# Patient Record
Sex: FEMALE | Race: WHITE | Employment: OTHER | URBAN - METROPOLITAN AREA
[De-identification: names, ages, dates, MRNs, and addresses within clinical notes are randomized per-mention and may not be internally consistent; named-entity substitution may affect disease eponyms.]

---

## 2017-01-30 ENCOUNTER — HOSPITAL ENCOUNTER (OUTPATIENT)
Dept: MAMMOGRAPHY | Age: 67
Discharge: HOME | End: 2017-01-30
Attending: OBSTETRICS & GYNECOLOGY | Admitting: OBSTETRICS & GYNECOLOGY

## 2017-01-30 DIAGNOSIS — Z12.31 ENCOUNTER FOR SCREENING MAMMOGRAM FOR BREAST CANCER: ICD-10-CM

## 2017-09-14 ENCOUNTER — TELEPHONE (OUTPATIENT)
Dept: INTERNAL MEDICINE | Age: 67
End: 2017-09-14

## 2017-09-14 DIAGNOSIS — Z00.00 PHYSICAL EXAM, ANNUAL: ICD-10-CM

## 2017-09-14 DIAGNOSIS — E78.5 HYPERLIPIDEMIA, UNSPECIFIED HYPERLIPIDEMIA TYPE: ICD-10-CM

## 2017-09-14 DIAGNOSIS — I10 ESSENTIAL HYPERTENSION: Primary | ICD-10-CM

## 2017-09-26 ENCOUNTER — OFFICE VISIT (OUTPATIENT)
Dept: INTERNAL MEDICINE | Age: 67
End: 2017-09-26

## 2017-09-26 VITALS
DIASTOLIC BLOOD PRESSURE: 80 MMHG | BODY MASS INDEX: 30.3 KG/M2 | HEART RATE: 60 BPM | HEIGHT: 63 IN | SYSTOLIC BLOOD PRESSURE: 152 MMHG | WEIGHT: 171 LBS

## 2017-09-26 DIAGNOSIS — M75.111 PARTIAL TEAR OF RIGHT ROTATOR CUFF: ICD-10-CM

## 2017-09-26 DIAGNOSIS — Z23 NEED FOR PNEUMOCOCCAL VACCINATION: ICD-10-CM

## 2017-09-26 DIAGNOSIS — I10 ESSENTIAL HYPERTENSION: Primary | ICD-10-CM

## 2017-09-26 DIAGNOSIS — M47.812 CERVICAL ARTHRITIS: ICD-10-CM

## 2017-09-26 DIAGNOSIS — G89.29 CHRONIC LEFT SHOULDER PAIN: ICD-10-CM

## 2017-09-26 DIAGNOSIS — M25.512 CHRONIC LEFT SHOULDER PAIN: ICD-10-CM

## 2017-09-26 DIAGNOSIS — K27.9 PEPTIC ULCER: ICD-10-CM

## 2017-09-26 DIAGNOSIS — E78.5 HYPERLIPIDEMIA, UNSPECIFIED HYPERLIPIDEMIA TYPE: ICD-10-CM

## 2017-09-26 DIAGNOSIS — M76.31 ILIOTIBIAL BAND TENDINITIS OF RIGHT SIDE: ICD-10-CM

## 2017-09-26 DIAGNOSIS — Z00.00 PHYSICAL EXAM, ANNUAL: ICD-10-CM

## 2017-09-26 DIAGNOSIS — Z23 NEED FOR INFLUENZA VACCINATION: ICD-10-CM

## 2017-09-26 PROCEDURE — 90662 IIV NO PRSV INCREASED AG IM: CPT | Performed by: INTERNAL MEDICINE

## 2017-09-26 PROCEDURE — G0439 PPPS, SUBSEQ VISIT: HCPCS | Performed by: INTERNAL MEDICINE

## 2017-09-26 PROCEDURE — G0008 ADMIN INFLUENZA VIRUS VAC: HCPCS | Performed by: INTERNAL MEDICINE

## 2017-09-26 PROCEDURE — G0009 ADMIN PNEUMOCOCCAL VACCINE: HCPCS | Performed by: INTERNAL MEDICINE

## 2017-09-26 PROCEDURE — 90732 PPSV23 VACC 2 YRS+ SUBQ/IM: CPT | Performed by: INTERNAL MEDICINE

## 2017-09-26 PROCEDURE — 93000 ELECTROCARDIOGRAM COMPLETE: CPT | Performed by: INTERNAL MEDICINE

## 2017-09-26 ASSESSMENT — PATIENT HEALTH QUESTIONNAIRE - PHQ9
SUM OF ALL RESPONSES TO PHQ9 QUESTIONS 1 & 2: 0
SUM OF ALL RESPONSES TO PHQ QUESTIONS 1-9: 0
1. LITTLE INTEREST OR PLEASURE IN DOING THINGS: 0
2. FEELING DOWN, DEPRESSED OR HOPELESS: 0

## 2017-09-26 ASSESSMENT — ENCOUNTER SYMPTOMS
GASTROINTESTINAL NEGATIVE: 1
ALLERGIC/IMMUNOLOGIC NEGATIVE: 1
RESPIRATORY NEGATIVE: 1

## 2018-02-06 ENCOUNTER — HOSPITAL ENCOUNTER (OUTPATIENT)
Dept: MAMMOGRAPHY | Age: 68
Discharge: HOME | End: 2018-02-06
Attending: FAMILY MEDICINE | Admitting: FAMILY MEDICINE

## 2018-02-06 DIAGNOSIS — Z12.31 ENCOUNTER FOR SCREENING MAMMOGRAM FOR BREAST CANCER: ICD-10-CM

## 2018-02-13 ENCOUNTER — HOSPITAL ENCOUNTER (OUTPATIENT)
Dept: MAMMOGRAPHY | Age: 68
Discharge: HOME | End: 2018-02-13
Attending: RADIOLOGY | Admitting: RADIOLOGY

## 2018-02-13 DIAGNOSIS — R92.0 ABNORMAL FINDING ON MAMMOGRAPHY, MICROCALCIFICATION: ICD-10-CM

## 2018-03-16 ENCOUNTER — OFFICE VISIT (OUTPATIENT)
Dept: INTERNAL MEDICINE | Age: 68
End: 2018-03-16

## 2018-03-16 VITALS
WEIGHT: 172 LBS | DIASTOLIC BLOOD PRESSURE: 68 MMHG | HEART RATE: 60 BPM | HEIGHT: 63 IN | SYSTOLIC BLOOD PRESSURE: 138 MMHG | BODY MASS INDEX: 30.48 KG/M2 | OXYGEN SATURATION: 97 %

## 2018-03-16 DIAGNOSIS — M47.812 CERVICAL ARTHRITIS: ICD-10-CM

## 2018-03-16 DIAGNOSIS — Z01.818 PREOP EXAMINATION: ICD-10-CM

## 2018-03-16 DIAGNOSIS — K27.9 PEPTIC ULCER: ICD-10-CM

## 2018-03-16 DIAGNOSIS — I10 ESSENTIAL HYPERTENSION: ICD-10-CM

## 2018-03-16 DIAGNOSIS — M72.2 PLANTAR FASCIITIS, RIGHT: ICD-10-CM

## 2018-03-16 PROCEDURE — 1036F TOBACCO NON-USER: CPT | Performed by: INTERNAL MEDICINE

## 2018-03-16 PROCEDURE — 3014F SCREEN MAMMO DOC REV: CPT | Performed by: INTERNAL MEDICINE

## 2018-03-16 PROCEDURE — 3017F COLORECTAL CA SCREEN DOC REV: CPT | Performed by: INTERNAL MEDICINE

## 2018-03-16 PROCEDURE — G8427 DOCREV CUR MEDS BY ELIG CLIN: HCPCS | Performed by: INTERNAL MEDICINE

## 2018-03-16 PROCEDURE — 4040F PNEUMOC VAC/ADMIN/RCVD: CPT | Performed by: INTERNAL MEDICINE

## 2018-03-16 PROCEDURE — G8482 FLU IMMUNIZE ORDER/ADMIN: HCPCS | Performed by: INTERNAL MEDICINE

## 2018-03-16 PROCEDURE — G8400 PT W/DXA NO RESULTS DOC: HCPCS | Performed by: INTERNAL MEDICINE

## 2018-03-16 PROCEDURE — 1090F PRES/ABSN URINE INCON ASSESS: CPT | Performed by: INTERNAL MEDICINE

## 2018-03-16 PROCEDURE — G8417 CALC BMI ABV UP PARAM F/U: HCPCS | Performed by: INTERNAL MEDICINE

## 2018-03-16 PROCEDURE — 1123F ACP DISCUSS/DSCN MKR DOCD: CPT | Performed by: INTERNAL MEDICINE

## 2018-03-16 PROCEDURE — 99215 OFFICE O/P EST HI 40 MIN: CPT | Performed by: INTERNAL MEDICINE

## 2018-03-16 RX ORDER — LANOLIN ALCOHOL/MO/W.PET/CERES
1 CREAM (GRAM) TOPICAL DAILY PRN
COMMUNITY
End: 2018-10-02

## 2018-03-16 ASSESSMENT — ENCOUNTER SYMPTOMS
RESPIRATORY NEGATIVE: 1
GASTROINTESTINAL NEGATIVE: 1
TROUBLE SWALLOWING: 0
EYE DISCHARGE: 0

## 2018-03-16 NOTE — ASSESSMENT & PLAN NOTE
Right foot plantar fasciitisscheduled surgical release. OK FOR SURGERY. No known anesthesia problems in patient or family. Take medications as directed.

## 2018-04-09 ENCOUNTER — OFFICE VISIT (OUTPATIENT)
Dept: INTERNAL MEDICINE | Age: 68
End: 2018-04-09

## 2018-04-09 VITALS
OXYGEN SATURATION: 98 % | WEIGHT: 172 LBS | HEART RATE: 57 BPM | SYSTOLIC BLOOD PRESSURE: 188 MMHG | HEIGHT: 63 IN | BODY MASS INDEX: 30.48 KG/M2 | DIASTOLIC BLOOD PRESSURE: 90 MMHG

## 2018-04-09 DIAGNOSIS — M81.0 AGE-RELATED OSTEOPOROSIS WITHOUT CURRENT PATHOLOGICAL FRACTURE: Primary | ICD-10-CM

## 2018-04-09 DIAGNOSIS — I10 ESSENTIAL HYPERTENSION: ICD-10-CM

## 2018-04-09 DIAGNOSIS — M47.812 NECK ARTHRITIS: ICD-10-CM

## 2018-04-09 PROBLEM — M76.31 ILIOTIBIAL BAND TENDINITIS OF RIGHT SIDE: Status: RESOLVED | Noted: 2017-09-26 | Resolved: 2018-04-09

## 2018-04-09 PROBLEM — Z01.818 PREOP EXAMINATION: Status: RESOLVED | Noted: 2018-03-16 | Resolved: 2018-04-09

## 2018-04-09 PROCEDURE — 3014F SCREEN MAMMO DOC REV: CPT | Performed by: INTERNAL MEDICINE

## 2018-04-09 PROCEDURE — 1036F TOBACCO NON-USER: CPT | Performed by: INTERNAL MEDICINE

## 2018-04-09 PROCEDURE — 3017F COLORECTAL CA SCREEN DOC REV: CPT | Performed by: INTERNAL MEDICINE

## 2018-04-09 PROCEDURE — 1123F ACP DISCUSS/DSCN MKR DOCD: CPT | Performed by: INTERNAL MEDICINE

## 2018-04-09 PROCEDURE — G8400 PT W/DXA NO RESULTS DOC: HCPCS | Performed by: INTERNAL MEDICINE

## 2018-04-09 PROCEDURE — 4040F PNEUMOC VAC/ADMIN/RCVD: CPT | Performed by: INTERNAL MEDICINE

## 2018-04-09 PROCEDURE — 99214 OFFICE O/P EST MOD 30 MIN: CPT | Performed by: INTERNAL MEDICINE

## 2018-04-09 PROCEDURE — 1090F PRES/ABSN URINE INCON ASSESS: CPT | Performed by: INTERNAL MEDICINE

## 2018-04-09 PROCEDURE — G8417 CALC BMI ABV UP PARAM F/U: HCPCS | Performed by: INTERNAL MEDICINE

## 2018-04-09 PROCEDURE — G8427 DOCREV CUR MEDS BY ELIG CLIN: HCPCS | Performed by: INTERNAL MEDICINE

## 2018-04-09 RX ORDER — AMLODIPINE BESYLATE 5 MG/1
5 TABLET ORAL DAILY
Qty: 30 TABLET | Refills: 3 | Status: SHIPPED | OUTPATIENT
Start: 2018-04-09 | End: 2018-05-08 | Stop reason: SDUPTHER

## 2018-04-09 ASSESSMENT — ENCOUNTER SYMPTOMS: RESPIRATORY NEGATIVE: 1

## 2018-04-11 ENCOUNTER — HOSPITAL ENCOUNTER (OUTPATIENT)
Dept: GENERAL RADIOLOGY | Age: 68
Discharge: HOME | End: 2018-04-11
Attending: OBSTETRICS & GYNECOLOGY | Admitting: OBSTETRICS & GYNECOLOGY

## 2018-04-11 DIAGNOSIS — M81.0 AGE-RELATED OSTEOPOROSIS WITHOUT CURRENT PATHOLOGICAL FRACTURE: ICD-10-CM

## 2018-05-08 ENCOUNTER — OFFICE VISIT (OUTPATIENT)
Dept: INTERNAL MEDICINE | Age: 68
End: 2018-05-08

## 2018-05-08 VITALS
WEIGHT: 172 LBS | BODY MASS INDEX: 30.48 KG/M2 | HEART RATE: 66 BPM | OXYGEN SATURATION: 98 % | DIASTOLIC BLOOD PRESSURE: 80 MMHG | SYSTOLIC BLOOD PRESSURE: 152 MMHG | HEIGHT: 63 IN

## 2018-05-08 DIAGNOSIS — M85.80 OSTEOPENIA, UNSPECIFIED LOCATION: ICD-10-CM

## 2018-05-08 DIAGNOSIS — I10 ESSENTIAL HYPERTENSION: ICD-10-CM

## 2018-05-08 DIAGNOSIS — Z00.00 PREVENTATIVE HEALTH CARE: Primary | ICD-10-CM

## 2018-05-08 PROCEDURE — G8427 DOCREV CUR MEDS BY ELIG CLIN: HCPCS | Performed by: INTERNAL MEDICINE

## 2018-05-08 PROCEDURE — 4040F PNEUMOC VAC/ADMIN/RCVD: CPT | Performed by: INTERNAL MEDICINE

## 2018-05-08 PROCEDURE — 1036F TOBACCO NON-USER: CPT | Performed by: INTERNAL MEDICINE

## 2018-05-08 PROCEDURE — G8417 CALC BMI ABV UP PARAM F/U: HCPCS | Performed by: INTERNAL MEDICINE

## 2018-05-08 PROCEDURE — G8399 PT W/DXA RESULTS DOCUMENT: HCPCS | Performed by: INTERNAL MEDICINE

## 2018-05-08 PROCEDURE — 1123F ACP DISCUSS/DSCN MKR DOCD: CPT | Performed by: INTERNAL MEDICINE

## 2018-05-08 PROCEDURE — 99214 OFFICE O/P EST MOD 30 MIN: CPT | Performed by: INTERNAL MEDICINE

## 2018-05-08 PROCEDURE — 3017F COLORECTAL CA SCREEN DOC REV: CPT | Performed by: INTERNAL MEDICINE

## 2018-05-08 PROCEDURE — 1090F PRES/ABSN URINE INCON ASSESS: CPT | Performed by: INTERNAL MEDICINE

## 2018-05-08 RX ORDER — AMLODIPINE BESYLATE 5 MG/1
5 TABLET ORAL DAILY
Qty: 90 TABLET | Refills: 3 | Status: SHIPPED | OUTPATIENT
Start: 2018-05-08 | End: 2019-04-13 | Stop reason: SDUPTHER

## 2018-05-08 ASSESSMENT — ENCOUNTER SYMPTOMS
GASTROINTESTINAL NEGATIVE: 1
RESPIRATORY NEGATIVE: 1

## 2018-06-26 RX ORDER — LOSARTAN POTASSIUM AND HYDROCHLOROTHIAZIDE 12.5; 1 MG/1; MG/1
TABLET ORAL
Qty: 90 TABLET | Refills: 0 | Status: SHIPPED | OUTPATIENT
Start: 2018-06-26 | End: 2018-09-25 | Stop reason: SDUPTHER

## 2018-09-10 ENCOUNTER — TELEPHONE (OUTPATIENT)
Dept: INTERNAL MEDICINE | Age: 68
End: 2018-09-10

## 2018-09-25 RX ORDER — LOSARTAN POTASSIUM AND HYDROCHLOROTHIAZIDE 12.5; 1 MG/1; MG/1
TABLET ORAL
Qty: 90 TABLET | Refills: 1 | Status: SHIPPED | OUTPATIENT
Start: 2018-09-25 | End: 2019-03-31 | Stop reason: SDUPTHER

## 2018-09-28 ENCOUNTER — OFFICE VISIT (OUTPATIENT)
Dept: INTERNAL MEDICINE CLINIC | Age: 68
End: 2018-09-28
Payer: MEDICARE

## 2018-09-28 VITALS
DIASTOLIC BLOOD PRESSURE: 66 MMHG | BODY MASS INDEX: 31.01 KG/M2 | WEIGHT: 175 LBS | SYSTOLIC BLOOD PRESSURE: 122 MMHG | HEIGHT: 63 IN

## 2018-09-28 DIAGNOSIS — E78.5 HYPERLIPIDEMIA, UNSPECIFIED HYPERLIPIDEMIA TYPE: ICD-10-CM

## 2018-09-28 DIAGNOSIS — Z00.00 ROUTINE GENERAL MEDICAL EXAMINATION AT A HEALTH CARE FACILITY: ICD-10-CM

## 2018-09-28 DIAGNOSIS — Z23 NEED FOR INFLUENZA VACCINATION: ICD-10-CM

## 2018-09-28 DIAGNOSIS — K27.9 PEPTIC ULCER: ICD-10-CM

## 2018-09-28 DIAGNOSIS — R13.12 OROPHARYNGEAL DYSPHAGIA: ICD-10-CM

## 2018-09-28 DIAGNOSIS — M72.2 PLANTAR FASCIITIS, RIGHT: ICD-10-CM

## 2018-09-28 DIAGNOSIS — I10 ESSENTIAL HYPERTENSION: ICD-10-CM

## 2018-09-28 DIAGNOSIS — Z00.00 PREVENTATIVE HEALTH CARE: Primary | ICD-10-CM

## 2018-09-28 PROCEDURE — G0439 PPPS, SUBSEQ VISIT: HCPCS | Performed by: INTERNAL MEDICINE

## 2018-09-28 PROCEDURE — 93000 ELECTROCARDIOGRAM COMPLETE: CPT | Performed by: INTERNAL MEDICINE

## 2018-09-28 PROCEDURE — 3017F COLORECTAL CA SCREEN DOC REV: CPT | Performed by: INTERNAL MEDICINE

## 2018-09-28 PROCEDURE — G8427 DOCREV CUR MEDS BY ELIG CLIN: HCPCS | Performed by: INTERNAL MEDICINE

## 2018-09-28 PROCEDURE — 99214 OFFICE O/P EST MOD 30 MIN: CPT | Performed by: INTERNAL MEDICINE

## 2018-09-28 PROCEDURE — 1036F TOBACCO NON-USER: CPT | Performed by: INTERNAL MEDICINE

## 2018-09-28 PROCEDURE — G8399 PT W/DXA RESULTS DOCUMENT: HCPCS | Performed by: INTERNAL MEDICINE

## 2018-09-28 PROCEDURE — G8417 CALC BMI ABV UP PARAM F/U: HCPCS | Performed by: INTERNAL MEDICINE

## 2018-09-28 PROCEDURE — 1123F ACP DISCUSS/DSCN MKR DOCD: CPT | Performed by: INTERNAL MEDICINE

## 2018-09-28 PROCEDURE — 4040F PNEUMOC VAC/ADMIN/RCVD: CPT | Performed by: INTERNAL MEDICINE

## 2018-09-28 PROCEDURE — 1090F PRES/ABSN URINE INCON ASSESS: CPT | Performed by: INTERNAL MEDICINE

## 2018-09-28 PROCEDURE — 1101F PT FALLS ASSESS-DOCD LE1/YR: CPT | Performed by: INTERNAL MEDICINE

## 2018-09-28 ASSESSMENT — PATIENT HEALTH QUESTIONNAIRE - PHQ9
SUM OF ALL RESPONSES TO PHQ QUESTIONS 1-9: 0
SUM OF ALL RESPONSES TO PHQ QUESTIONS 1-9: 0

## 2018-09-28 ASSESSMENT — LIFESTYLE VARIABLES
HOW OFTEN DO YOU HAVE A DRINK CONTAINING ALCOHOL: 2
HOW OFTEN DO YOU HAVE SIX OR MORE DRINKS ON ONE OCCASION: 0
HOW OFTEN DURING THE LAST YEAR HAVE YOU HAD A FEELING OF GUILT OR REMORSE AFTER DRINKING: 0
HOW MANY STANDARD DRINKS CONTAINING ALCOHOL DO YOU HAVE ON A TYPICAL DAY: 0
HOW OFTEN DURING THE LAST YEAR HAVE YOU BEEN UNABLE TO REMEMBER WHAT HAPPENED THE NIGHT BEFORE BECAUSE YOU HAD BEEN DRINKING: 0
HOW OFTEN DURING THE LAST YEAR HAVE YOU FAILED TO DO WHAT WAS NORMALLY EXPECTED FROM YOU BECAUSE OF DRINKING: 0
HOW OFTEN DURING THE LAST YEAR HAVE YOU NEEDED AN ALCOHOLIC DRINK FIRST THING IN THE MORNING TO GET YOURSELF GOING AFTER A NIGHT OF HEAVY DRINKING: 0
AUDIT-C TOTAL SCORE: 2
HOW OFTEN DURING THE LAST YEAR HAVE YOU FOUND THAT YOU WERE NOT ABLE TO STOP DRINKING ONCE YOU HAD STARTED: 0

## 2018-09-28 ASSESSMENT — ANXIETY QUESTIONNAIRES: GAD7 TOTAL SCORE: 0

## 2018-09-28 ASSESSMENT — ENCOUNTER SYMPTOMS
BACK PAIN: 1
GASTROINTESTINAL NEGATIVE: 1
RESPIRATORY NEGATIVE: 1
ALLERGIC/IMMUNOLOGIC NEGATIVE: 1
EYES NEGATIVE: 1
TROUBLE SWALLOWING: 1

## 2018-09-28 NOTE — PATIENT INSTRUCTIONS
Personalized Preventive Plan for Molly Monae - 9/28/2018  Medicare offers a range of preventive health benefits. Some of the tests and screenings are paid in full while other may be subject to a deductible, co-insurance, and/or copay. Some of these benefits include a comprehensive review of your medical history including lifestyle, illnesses that may run in your family, and various assessments and screenings as appropriate. After reviewing your medical record and screening and assessments performed today your provider may have ordered immunizations, labs, imaging, and/or referrals for you. A list of these orders (if applicable) as well as your Preventive Care list are included within your After Visit Summary for your review. Other Preventive Recommendations:    · A preventive eye exam performed by an eye specialist is recommended every 1-2 years to screen for glaucoma; cataracts, macular degeneration, and other eye disorders. · A preventive dental visit is recommended every 6 months. · Try to get at least 150 minutes of exercise per week or 10,000 steps per day on a pedometer . · Order or download the FREE \"Exercise & Physical Activity: Your Everyday Guide\" from [employer]. Call 5-464.800.2088 or search [employer] online. · You need 6412-7893 mg of calcium and 6300-4063 IU of vitamin D per day. It is possible to meet your calcium requirement with diet alone, but a vitamin D supplement is usually necessary to meet this goal.  · When exposed to the sun, use a sunscreen that protects against both UVA and UVB radiation with an SPF of 30 or greater. Reapply every 2 to 3 hours or after sweating, drying off with a towel, or swimming. · Always wear a seat belt when traveling in a car. Always wear a helmet when riding a bicycle or motorcycle.

## 2018-09-28 NOTE — ASSESSMENT & PLAN NOTE
Only when she swallows a pill with water. No food problems. No heartburn or reflux symptoms. Seems to becoming more frequent. Advised her to follow up with GI for possible EGD evaluation.

## 2018-09-28 NOTE — ASSESSMENT & PLAN NOTE
Routine checkup. Nonsmoker. Up-to-date on most preventative care. Need shingles vaccine soon.   See problem list.

## 2018-10-01 PROCEDURE — 90662 IIV NO PRSV INCREASED AG IM: CPT | Performed by: INTERNAL MEDICINE

## 2018-10-01 PROCEDURE — G0008 ADMIN INFLUENZA VIRUS VAC: HCPCS | Performed by: INTERNAL MEDICINE

## 2018-10-02 NOTE — PROGRESS NOTES
you have a living will and a durable power of  for healthcare, please bring in a copy. As part of our patient safety program to minimize surgical site infections, we ask you to do the following:    · Please notify your surgeon if you develop any illness between         now and the  day of your surgery. · This includes a cough, cold, fever, sore throat, nausea,         or vomiting, and diarrhea, etc.  ·  Please notify your surgeon if you experience dizziness, shortness         of breath or blurred vision between now and the time of your surgery. You may shower the night before surgery or the morning of   your surgery with an antibacterial soap. You will need to bring a photo ID and insurance card    [facility] has an onsite pharmacy, would you like to utilize our pharmacy     If you will be staying overnight and use a C-pap machine, please bring   your C-pap to hospital     Our goal is to provide you with excellent care, therefore, visitors will be limited to two(2) in the room at a time so that we may focus on providing this care for you. Please contact pre-admission testing if you have any further questions. [facility] phone number:  [phone]  Please note these are generalized instructions for all surgical cases, you may be provided with more specific instructions according to your surgery.

## 2018-10-08 ENCOUNTER — ANESTHESIA EVENT (OUTPATIENT)
Dept: ENDOSCOPY | Age: 68
End: 2018-10-08
Payer: MEDICARE

## 2018-10-09 ENCOUNTER — ANESTHESIA (OUTPATIENT)
Dept: ENDOSCOPY | Age: 68
End: 2018-10-09
Payer: MEDICARE

## 2018-10-09 ENCOUNTER — HOSPITAL ENCOUNTER (OUTPATIENT)
Age: 68
Setting detail: OUTPATIENT SURGERY
Discharge: HOME | End: 2018-10-09
Attending: INTERNAL MEDICINE | Admitting: INTERNAL MEDICINE
Payer: MEDICARE

## 2018-10-09 VITALS
DIASTOLIC BLOOD PRESSURE: 71 MMHG | OXYGEN SATURATION: 99 % | RESPIRATION RATE: 16 BRPM | BODY MASS INDEX: 30.9 KG/M2 | HEART RATE: 64 BPM | WEIGHT: 174.38 LBS | TEMPERATURE: 97.5 F | HEIGHT: 63 IN | SYSTOLIC BLOOD PRESSURE: 170 MMHG

## 2018-10-09 VITALS — OXYGEN SATURATION: 87 % | SYSTOLIC BLOOD PRESSURE: 142 MMHG | DIASTOLIC BLOOD PRESSURE: 83 MMHG

## 2018-10-09 PROCEDURE — 7100000010 HC PHASE II RECOVERY - FIRST 15 MIN: Performed by: INTERNAL MEDICINE

## 2018-10-09 PROCEDURE — 3609015300 HC ESOPHAGEAL DILATION MALONEY: Performed by: INTERNAL MEDICINE

## 2018-10-09 PROCEDURE — 2580000003 HC RX 258: Performed by: ANESTHESIOLOGY

## 2018-10-09 PROCEDURE — 7100000011 HC PHASE II RECOVERY - ADDTL 15 MIN: Performed by: INTERNAL MEDICINE

## 2018-10-09 PROCEDURE — 3700000000 HC ANESTHESIA ATTENDED CARE: Performed by: INTERNAL MEDICINE

## 2018-10-09 PROCEDURE — 6360000002 HC RX W HCPCS: Performed by: NURSE ANESTHETIST, CERTIFIED REGISTERED

## 2018-10-09 PROCEDURE — 3609012800 HC EGD DIAGNOSTIC ONLY: Performed by: INTERNAL MEDICINE

## 2018-10-09 RX ORDER — PROPOFOL 10 MG/ML
INJECTION, EMULSION INTRAVENOUS PRN
Status: DISCONTINUED | OUTPATIENT
Start: 2018-10-09 | End: 2018-10-09 | Stop reason: SDUPTHER

## 2018-10-09 RX ORDER — SODIUM CHLORIDE 9 MG/ML
INJECTION, SOLUTION INTRAVENOUS CONTINUOUS
Status: DISCONTINUED | OUTPATIENT
Start: 2018-10-09 | End: 2018-10-09 | Stop reason: HOSPADM

## 2018-10-09 RX ADMIN — PROPOFOL 40 MG: 10 INJECTION, EMULSION INTRAVENOUS at 08:23

## 2018-10-09 RX ADMIN — SODIUM CHLORIDE: 0.9 INJECTION, SOLUTION INTRAVENOUS at 08:01

## 2018-10-09 RX ADMIN — PROPOFOL 70 MG: 10 INJECTION, EMULSION INTRAVENOUS at 08:19

## 2018-10-09 RX ADMIN — PROPOFOL 40 MG: 10 INJECTION, EMULSION INTRAVENOUS at 08:21

## 2018-10-09 ASSESSMENT — PAIN DESCRIPTION - LOCATION
LOCATION: THROAT
LOCATION: THROAT

## 2018-10-09 ASSESSMENT — PAIN DESCRIPTION - PAIN TYPE
TYPE: ACUTE PAIN
TYPE: ACUTE PAIN

## 2018-10-09 ASSESSMENT — PAIN SCALES - GENERAL
PAINLEVEL_OUTOF10: 2
PAINLEVEL_OUTOF10: 0
PAINLEVEL_OUTOF10: 2

## 2018-10-09 ASSESSMENT — PAIN - FUNCTIONAL ASSESSMENT: PAIN_FUNCTIONAL_ASSESSMENT: 0-10

## 2018-10-09 NOTE — ANESTHESIA POSTPROCEDURE EVALUATION
Department of Anesthesiology  Postprocedure Note    Patient: Christiano Collins  MRN: [id number]  YOB: 1950  Date of evaluation: 10/9/2018  Time:  9:30 AM     Procedure Summary     Date:  10/09/18 Room / Location:  [facility] / [facility]    Anesthesia Start:  0815 Anesthesia Stop:  0915    Procedures:       EGD DILATION BALLOON (N/A )      ESOPHAGEAL DILATION Melanie Morales (N/A ) Diagnosis:       Dysphagia, unspecified type      (DYSPHAGIA)    Surgeon:  Celsa Blair MD Responsible Provider:  Loni Zaidi MD    Anesthesia Type:  MAC ASA Status:  2          Anesthesia Type: MAC    Harpreet Phase I: Harpreet Score: 10    Harpreet Phase II: Harpreet Score: 10    Last vitals: Reviewed and per EMR flowsheets.        Anesthesia Post Evaluation    Patient location during evaluation: PACU  Patient participation: complete - patient participated  Level of consciousness: awake and alert  Pain score: 0  Airway patency: patent  Nausea & Vomiting: no nausea and no vomiting  Complications: no  Cardiovascular status: blood pressure returned to baseline  Respiratory status: acceptable  Hydration status: euvolemic

## 2018-10-09 NOTE — ANESTHESIA PRE PROCEDURE
pre-anesthesia assessment may be used as a history and physical.    DOS STAFF ADDENDUM:    Pt seen and examined, chart reviewed (including anesthesia, drug and allergy history). No interval changes to history and physical examination. Anesthetic plan, risks, benefits, alternatives, and personnel involved discussed with patient. Patient verbalized an understanding and agrees to proceed.       Adela Cordero MD  October 9, 2018  8:01 AM

## 2018-10-28 PROBLEM — Z00.00 PREVENTATIVE HEALTH CARE: Status: RESOLVED | Noted: 2018-09-28 | Resolved: 2018-10-28

## 2019-02-21 ENCOUNTER — HOSPITAL ENCOUNTER (OUTPATIENT)
Dept: WOMENS IMAGING | Age: 69
Discharge: HOME | End: 2019-02-21
Payer: MEDICARE

## 2019-02-21 DIAGNOSIS — Z12.31 BREAST CANCER SCREENING BY MAMMOGRAM: ICD-10-CM

## 2019-02-21 PROCEDURE — 77063 BREAST TOMOSYNTHESIS BI: CPT

## 2019-09-06 ENCOUNTER — TELEPHONE (OUTPATIENT)
Dept: INTERNAL MEDICINE CLINIC | Age: 69
End: 2019-09-06

## 2019-09-06 DIAGNOSIS — Z00.00 PREVENTATIVE HEALTH CARE: Primary | ICD-10-CM

## 2019-09-25 DIAGNOSIS — Z00.00 PREVENTATIVE HEALTH CARE: ICD-10-CM

## 2019-09-25 LAB
A/G RATIO: 2 (ref 1.1–2.2)
ALBUMIN SERPL-MCNC: 4.5 G/DL (ref 3.4–5)
ALP BLD-CCNC: 88 U/L (ref 40–129)
ALT SERPL-CCNC: 17 U/L (ref 10–40)
ANION GAP SERPL CALCULATED.3IONS-SCNC: 13 MMOL/L (ref 3–16)
AST SERPL-CCNC: 19 U/L (ref 15–37)
BASOPHILS ABSOLUTE: 0 K/UL (ref 0–0.2)
BASOPHILS RELATIVE PERCENT: 0.3 %
BILIRUB SERPL-MCNC: 0.4 MG/DL (ref 0–1)
BUN BLDV-MCNC: 18 MG/DL (ref 7–20)
CALCIUM SERPL-MCNC: 9.8 MG/DL (ref 8.3–10.6)
CHLORIDE BLD-SCNC: 102 MMOL/L (ref 99–110)
CHOLESTEROL, TOTAL: 221 MG/DL (ref 0–199)
CO2: 26 MMOL/L (ref 21–32)
CREAT SERPL-MCNC: 0.7 MG/DL (ref 0.6–1.2)
EOSINOPHILS ABSOLUTE: 0 K/UL (ref 0–0.6)
EOSINOPHILS RELATIVE PERCENT: 0.1 %
GFR AFRICAN AMERICAN: >60
GFR NON-AFRICAN AMERICAN: >60
GLOBULIN: 2.3 G/DL
GLUCOSE BLD-MCNC: 92 MG/DL (ref 70–99)
HCT VFR BLD CALC: 41.1 % (ref 36–48)
HDLC SERPL-MCNC: 60 MG/DL (ref 40–60)
HEMOGLOBIN: 13.4 G/DL (ref 12–16)
LDL CHOLESTEROL CALCULATED: 138 MG/DL
LYMPHOCYTES ABSOLUTE: 1.9 K/UL (ref 1–5.1)
LYMPHOCYTES RELATIVE PERCENT: 15.4 %
MCH RBC QN AUTO: 31.5 PG (ref 26–34)
MCHC RBC AUTO-ENTMCNC: 32.5 G/DL (ref 31–36)
MCV RBC AUTO: 96.9 FL (ref 80–100)
MONOCYTES ABSOLUTE: 0.8 K/UL (ref 0–1.3)
MONOCYTES RELATIVE PERCENT: 6.6 %
NEUTROPHILS ABSOLUTE: 9.4 K/UL (ref 1.7–7.7)
NEUTROPHILS RELATIVE PERCENT: 77.6 %
PDW BLD-RTO: 13.8 % (ref 12.4–15.4)
PLATELET # BLD: 295 K/UL (ref 135–450)
PMV BLD AUTO: 9.4 FL (ref 5–10.5)
POTASSIUM SERPL-SCNC: 4.2 MMOL/L (ref 3.5–5.1)
RBC # BLD: 4.24 M/UL (ref 4–5.2)
SODIUM BLD-SCNC: 141 MMOL/L (ref 136–145)
TOTAL PROTEIN: 6.8 G/DL (ref 6.4–8.2)
TRIGL SERPL-MCNC: 116 MG/DL (ref 0–150)
TSH REFLEX: 1.5 UIU/ML (ref 0.27–4.2)
VLDLC SERPL CALC-MCNC: 23 MG/DL
WBC # BLD: 12.1 K/UL (ref 4–11)

## 2019-10-01 ENCOUNTER — OFFICE VISIT (OUTPATIENT)
Dept: INTERNAL MEDICINE CLINIC | Age: 69
End: 2019-10-01
Payer: MEDICARE

## 2019-10-01 VITALS
SYSTOLIC BLOOD PRESSURE: 118 MMHG | WEIGHT: 172 LBS | DIASTOLIC BLOOD PRESSURE: 78 MMHG | BODY MASS INDEX: 30.48 KG/M2 | HEART RATE: 55 BPM | HEIGHT: 63 IN | OXYGEN SATURATION: 98 %

## 2019-10-01 DIAGNOSIS — E78.5 HYPERLIPIDEMIA, UNSPECIFIED HYPERLIPIDEMIA TYPE: ICD-10-CM

## 2019-10-01 DIAGNOSIS — Z23 NEED FOR INFLUENZA VACCINATION: ICD-10-CM

## 2019-10-01 DIAGNOSIS — Z00.00 PREVENTATIVE HEALTH CARE: Primary | ICD-10-CM

## 2019-10-01 DIAGNOSIS — M19.90 ARTHRITIS: ICD-10-CM

## 2019-10-01 DIAGNOSIS — R21 RASH AND NONSPECIFIC SKIN ERUPTION: ICD-10-CM

## 2019-10-01 DIAGNOSIS — Z00.00 ROUTINE GENERAL MEDICAL EXAMINATION AT A HEALTH CARE FACILITY: ICD-10-CM

## 2019-10-01 DIAGNOSIS — M67.432 GANGLION OF LEFT WRIST: ICD-10-CM

## 2019-10-01 DIAGNOSIS — K27.9 PEPTIC ULCER: ICD-10-CM

## 2019-10-01 DIAGNOSIS — M51.36 DEGENERATIVE DISC DISEASE, LUMBAR: ICD-10-CM

## 2019-10-01 DIAGNOSIS — D72.823 LEUKEMOID REACTION: ICD-10-CM

## 2019-10-01 DIAGNOSIS — M72.2 PLANTAR FASCIITIS, RIGHT: ICD-10-CM

## 2019-10-01 DIAGNOSIS — I10 ESSENTIAL HYPERTENSION: ICD-10-CM

## 2019-10-01 PROBLEM — M51.369 DEGENERATIVE DISC DISEASE, LUMBAR: Status: ACTIVE | Noted: 2019-10-01

## 2019-10-01 PROCEDURE — 1123F ACP DISCUSS/DSCN MKR DOCD: CPT | Performed by: INTERNAL MEDICINE

## 2019-10-01 PROCEDURE — 4040F PNEUMOC VAC/ADMIN/RCVD: CPT | Performed by: INTERNAL MEDICINE

## 2019-10-01 PROCEDURE — 90653 IIV ADJUVANT VACCINE IM: CPT | Performed by: INTERNAL MEDICINE

## 2019-10-01 PROCEDURE — 1090F PRES/ABSN URINE INCON ASSESS: CPT | Performed by: INTERNAL MEDICINE

## 2019-10-01 PROCEDURE — G8417 CALC BMI ABV UP PARAM F/U: HCPCS | Performed by: INTERNAL MEDICINE

## 2019-10-01 PROCEDURE — G8482 FLU IMMUNIZE ORDER/ADMIN: HCPCS | Performed by: INTERNAL MEDICINE

## 2019-10-01 PROCEDURE — 1036F TOBACCO NON-USER: CPT | Performed by: INTERNAL MEDICINE

## 2019-10-01 PROCEDURE — 99214 OFFICE O/P EST MOD 30 MIN: CPT | Performed by: INTERNAL MEDICINE

## 2019-10-01 PROCEDURE — G0439 PPPS, SUBSEQ VISIT: HCPCS | Performed by: INTERNAL MEDICINE

## 2019-10-01 PROCEDURE — G8427 DOCREV CUR MEDS BY ELIG CLIN: HCPCS | Performed by: INTERNAL MEDICINE

## 2019-10-01 PROCEDURE — G0008 ADMIN INFLUENZA VIRUS VAC: HCPCS | Performed by: INTERNAL MEDICINE

## 2019-10-01 PROCEDURE — 3017F COLORECTAL CA SCREEN DOC REV: CPT | Performed by: INTERNAL MEDICINE

## 2019-10-01 PROCEDURE — G8399 PT W/DXA RESULTS DOCUMENT: HCPCS | Performed by: INTERNAL MEDICINE

## 2019-10-01 PROCEDURE — 93000 ELECTROCARDIOGRAM COMPLETE: CPT | Performed by: INTERNAL MEDICINE

## 2019-10-01 RX ORDER — MELOXICAM 15 MG/1
15 TABLET ORAL DAILY
Qty: 30 TABLET | Refills: 1 | Status: SHIPPED | OUTPATIENT
Start: 2019-10-01 | End: 2019-10-01 | Stop reason: SDUPTHER

## 2019-10-01 RX ORDER — CLOTRIMAZOLE AND BETAMETHASONE DIPROPIONATE 10; .64 MG/G; MG/G
CREAM TOPICAL
Qty: 15 G | Refills: 1 | Status: SHIPPED | OUTPATIENT
Start: 2019-10-01 | End: 2022-08-04

## 2019-10-01 ASSESSMENT — PATIENT HEALTH QUESTIONNAIRE - PHQ9
SUM OF ALL RESPONSES TO PHQ9 QUESTIONS 1 & 2: 0
1. LITTLE INTEREST OR PLEASURE IN DOING THINGS: 0
SUM OF ALL RESPONSES TO PHQ QUESTIONS 1-9: 0
SUM OF ALL RESPONSES TO PHQ QUESTIONS 1-9: 0
2. FEELING DOWN, DEPRESSED OR HOPELESS: 0

## 2019-10-01 ASSESSMENT — ENCOUNTER SYMPTOMS
BACK PAIN: 1
RESPIRATORY NEGATIVE: 1
GASTROINTESTINAL NEGATIVE: 1
EYE DISCHARGE: 0
TROUBLE SWALLOWING: 0
BLOOD IN STOOL: 0

## 2019-10-01 ASSESSMENT — LIFESTYLE VARIABLES: HOW OFTEN DO YOU HAVE A DRINK CONTAINING ALCOHOL: 0

## 2019-10-02 RX ORDER — MELOXICAM 15 MG/1
TABLET ORAL
Qty: 90 TABLET | Refills: 1 | Status: SHIPPED | OUTPATIENT
Start: 2019-10-02 | End: 2019-11-26 | Stop reason: SDUPTHER

## 2019-10-31 PROBLEM — Z00.00 PREVENTATIVE HEALTH CARE: Status: RESOLVED | Noted: 2018-09-28 | Resolved: 2019-10-31

## 2019-11-26 RX ORDER — MELOXICAM 15 MG/1
TABLET ORAL
Qty: 30 TABLET | Refills: 0 | Status: SHIPPED | OUTPATIENT
Start: 2019-11-26 | End: 2019-12-24

## 2020-02-18 RX ORDER — MELOXICAM 15 MG/1
TABLET ORAL
Qty: 30 TABLET | Refills: 1 | Status: SHIPPED | OUTPATIENT
Start: 2020-02-18 | End: 2020-04-13

## 2020-02-24 ENCOUNTER — HOSPITAL ENCOUNTER (OUTPATIENT)
Dept: WOMENS IMAGING | Age: 70
Discharge: HOME | End: 2020-02-24
Payer: MEDICARE

## 2020-02-24 PROCEDURE — 77063 BREAST TOMOSYNTHESIS BI: CPT

## 2020-03-23 RX ORDER — LOSARTAN POTASSIUM AND HYDROCHLOROTHIAZIDE 12.5; 1 MG/1; MG/1
TABLET ORAL
Qty: 90 TABLET | Refills: 1 | Status: SHIPPED | OUTPATIENT
Start: 2020-03-23 | End: 2020-09-16

## 2020-04-13 RX ORDER — AMLODIPINE BESYLATE 5 MG/1
5 TABLET ORAL DAILY
Qty: 90 TABLET | Refills: 1 | Status: SHIPPED | OUTPATIENT
Start: 2020-04-13 | End: 2020-10-20

## 2020-04-13 RX ORDER — MELOXICAM 15 MG/1
TABLET ORAL
Qty: 30 TABLET | Refills: 1 | Status: SHIPPED | OUTPATIENT
Start: 2020-04-13 | End: 2020-04-13

## 2020-04-13 RX ORDER — MELOXICAM 15 MG/1
TABLET ORAL
Qty: 90 TABLET | Refills: 1 | Status: SHIPPED | OUTPATIENT
Start: 2020-04-13 | End: 2020-10-27

## 2020-07-07 ENCOUNTER — HOSPITAL ENCOUNTER (OUTPATIENT)
Dept: GENERAL RADIOLOGY | Age: 70
Discharge: HOME | End: 2020-07-07
Payer: MEDICARE

## 2020-07-07 PROCEDURE — 77080 DXA BONE DENSITY AXIAL: CPT

## 2020-09-16 RX ORDER — LOSARTAN POTASSIUM AND HYDROCHLOROTHIAZIDE 12.5; 1 MG/1; MG/1
TABLET ORAL
Qty: 90 TABLET | Refills: 0 | Status: SHIPPED | OUTPATIENT
Start: 2020-09-16 | End: 2020-12-22 | Stop reason: SDUPTHER

## 2020-09-17 ENCOUNTER — TELEPHONE (OUTPATIENT)
Dept: INTERNAL MEDICINE CLINIC | Age: 70
End: 2020-09-17

## 2020-09-29 DIAGNOSIS — I10 ESSENTIAL HYPERTENSION: ICD-10-CM

## 2020-09-29 DIAGNOSIS — E78.5 HYPERLIPIDEMIA, UNSPECIFIED HYPERLIPIDEMIA TYPE: ICD-10-CM

## 2020-09-29 LAB
A/G RATIO: 2 (ref 1.1–2.2)
ALBUMIN SERPL-MCNC: 4.3 G/DL (ref 3.4–5)
ALP BLD-CCNC: 96 U/L (ref 40–129)
ALT SERPL-CCNC: 14 U/L (ref 10–40)
ANION GAP SERPL CALCULATED.3IONS-SCNC: 13 MMOL/L (ref 3–16)
AST SERPL-CCNC: 19 U/L (ref 15–37)
BASOPHILS ABSOLUTE: 0.1 K/UL (ref 0–0.2)
BASOPHILS RELATIVE PERCENT: 1.3 %
BILIRUB SERPL-MCNC: 0.4 MG/DL (ref 0–1)
BILIRUBIN URINE: NEGATIVE
BLOOD, URINE: NEGATIVE
BUN BLDV-MCNC: 19 MG/DL (ref 7–20)
CALCIUM SERPL-MCNC: 9.6 MG/DL (ref 8.3–10.6)
CHLORIDE BLD-SCNC: 103 MMOL/L (ref 99–110)
CHOLESTEROL, TOTAL: 213 MG/DL (ref 0–199)
CLARITY: CLEAR
CO2: 26 MMOL/L (ref 21–32)
COLOR: YELLOW
CREAT SERPL-MCNC: 0.8 MG/DL (ref 0.6–1.2)
EOSINOPHILS ABSOLUTE: 0.4 K/UL (ref 0–0.6)
EOSINOPHILS RELATIVE PERCENT: 6.1 %
GFR AFRICAN AMERICAN: >60
GFR NON-AFRICAN AMERICAN: >60
GLOBULIN: 2.1 G/DL
GLUCOSE BLD-MCNC: 106 MG/DL (ref 70–99)
GLUCOSE URINE: NEGATIVE MG/DL
HCT VFR BLD CALC: 38.9 % (ref 36–48)
HDLC SERPL-MCNC: 57 MG/DL (ref 40–60)
HEMOGLOBIN: 13.1 G/DL (ref 12–16)
KETONES, URINE: NEGATIVE MG/DL
LDL CHOLESTEROL CALCULATED: 132 MG/DL
LEUKOCYTE ESTERASE, URINE: NEGATIVE
LYMPHOCYTES ABSOLUTE: 1.6 K/UL (ref 1–5.1)
LYMPHOCYTES RELATIVE PERCENT: 26 %
MCH RBC QN AUTO: 32.2 PG (ref 26–34)
MCHC RBC AUTO-ENTMCNC: 33.8 G/DL (ref 31–36)
MCV RBC AUTO: 95.1 FL (ref 80–100)
MICROSCOPIC EXAMINATION: NORMAL
MONOCYTES ABSOLUTE: 0.5 K/UL (ref 0–1.3)
MONOCYTES RELATIVE PERCENT: 8.4 %
NEUTROPHILS ABSOLUTE: 3.5 K/UL (ref 1.7–7.7)
NEUTROPHILS RELATIVE PERCENT: 58.2 %
NITRITE, URINE: NEGATIVE
PDW BLD-RTO: 13.4 % (ref 12.4–15.4)
PH UA: 6.5 (ref 5–8)
PLATELET # BLD: 285 K/UL (ref 135–450)
PMV BLD AUTO: 9.3 FL (ref 5–10.5)
POTASSIUM SERPL-SCNC: 4.5 MMOL/L (ref 3.5–5.1)
PROTEIN UA: NEGATIVE MG/DL
RBC # BLD: 4.09 M/UL (ref 4–5.2)
SODIUM BLD-SCNC: 142 MMOL/L (ref 136–145)
SPECIFIC GRAVITY UA: 1.02 (ref 1–1.03)
TOTAL PROTEIN: 6.4 G/DL (ref 6.4–8.2)
TRIGL SERPL-MCNC: 121 MG/DL (ref 0–150)
TSH REFLEX: 2.83 UIU/ML (ref 0.27–4.2)
URINE TYPE: NORMAL
UROBILINOGEN, URINE: 0.2 E.U./DL
VLDLC SERPL CALC-MCNC: 24 MG/DL
WBC # BLD: 6.1 K/UL (ref 4–11)

## 2020-10-02 ENCOUNTER — HOSPITAL ENCOUNTER (OUTPATIENT)
Dept: GENERAL RADIOLOGY | Age: 70
Discharge: HOME | End: 2020-10-02
Payer: MEDICARE

## 2020-10-02 ENCOUNTER — HOSPITAL ENCOUNTER (OUTPATIENT)
Age: 70
Discharge: HOME | End: 2020-10-02
Payer: MEDICARE

## 2020-10-02 ENCOUNTER — OFFICE VISIT (OUTPATIENT)
Dept: INTERNAL MEDICINE CLINIC | Age: 70
End: 2020-10-02
Payer: MEDICARE

## 2020-10-02 VITALS
BODY MASS INDEX: 30.48 KG/M2 | WEIGHT: 172 LBS | SYSTOLIC BLOOD PRESSURE: 140 MMHG | HEIGHT: 63 IN | HEART RATE: 60 BPM | TEMPERATURE: 96 F | DIASTOLIC BLOOD PRESSURE: 80 MMHG

## 2020-10-02 PROBLEM — R21 RASH AND NONSPECIFIC SKIN ERUPTION: Status: RESOLVED | Noted: 2019-10-01 | Resolved: 2020-10-02

## 2020-10-02 PROBLEM — M72.2 PLANTAR FASCIITIS, RIGHT: Status: RESOLVED | Noted: 2018-03-16 | Resolved: 2020-10-02

## 2020-10-02 PROBLEM — R05.9 COUGH: Status: ACTIVE | Noted: 2020-10-02

## 2020-10-02 PROBLEM — D72.823 LEUKEMOID REACTION: Status: RESOLVED | Noted: 2019-10-01 | Resolved: 2020-10-02

## 2020-10-02 PROCEDURE — G0008 ADMIN INFLUENZA VIRUS VAC: HCPCS | Performed by: INTERNAL MEDICINE

## 2020-10-02 PROCEDURE — 3017F COLORECTAL CA SCREEN DOC REV: CPT | Performed by: INTERNAL MEDICINE

## 2020-10-02 PROCEDURE — G8484 FLU IMMUNIZE NO ADMIN: HCPCS | Performed by: INTERNAL MEDICINE

## 2020-10-02 PROCEDURE — G0439 PPPS, SUBSEQ VISIT: HCPCS | Performed by: INTERNAL MEDICINE

## 2020-10-02 PROCEDURE — 4040F PNEUMOC VAC/ADMIN/RCVD: CPT | Performed by: INTERNAL MEDICINE

## 2020-10-02 PROCEDURE — 1123F ACP DISCUSS/DSCN MKR DOCD: CPT | Performed by: INTERNAL MEDICINE

## 2020-10-02 PROCEDURE — 90694 VACC AIIV4 NO PRSRV 0.5ML IM: CPT | Performed by: INTERNAL MEDICINE

## 2020-10-02 PROCEDURE — 71046 X-RAY EXAM CHEST 2 VIEWS: CPT

## 2020-10-02 ASSESSMENT — PATIENT HEALTH QUESTIONNAIRE - PHQ9
SUM OF ALL RESPONSES TO PHQ QUESTIONS 1-9: 0
SUM OF ALL RESPONSES TO PHQ9 QUESTIONS 1 & 2: 0
1. LITTLE INTEREST OR PLEASURE IN DOING THINGS: 0
SUM OF ALL RESPONSES TO PHQ QUESTIONS 1-9: 0
2. FEELING DOWN, DEPRESSED OR HOPELESS: 0

## 2020-10-02 ASSESSMENT — ENCOUNTER SYMPTOMS
GASTROINTESTINAL NEGATIVE: 1
RHINORRHEA: 0
COUGH: 1
WHEEZING: 0
SHORTNESS OF BREATH: 0
TROUBLE SWALLOWING: 0
ABDOMINAL PAIN: 0
BACK PAIN: 1

## 2020-10-02 ASSESSMENT — LIFESTYLE VARIABLES
HOW MANY STANDARD DRINKS CONTAINING ALCOHOL DO YOU HAVE ON A TYPICAL DAY: 0
HAS A RELATIVE, FRIEND, DOCTOR, OR ANOTHER HEALTH PROFESSIONAL EXPRESSED CONCERN ABOUT YOUR DRINKING OR SUGGESTED YOU CUT DOWN: 0
HOW OFTEN DO YOU HAVE A DRINK CONTAINING ALCOHOL: 2
HOW OFTEN DURING THE LAST YEAR HAVE YOU FOUND THAT YOU WERE NOT ABLE TO STOP DRINKING ONCE YOU HAD STARTED: 0
HOW OFTEN DURING THE LAST YEAR HAVE YOU BEEN UNABLE TO REMEMBER WHAT HAPPENED THE NIGHT BEFORE BECAUSE YOU HAD BEEN DRINKING: 0
HOW OFTEN DURING THE LAST YEAR HAVE YOU HAD A FEELING OF GUILT OR REMORSE AFTER DRINKING: 0
HOW OFTEN DURING THE LAST YEAR HAVE YOU FAILED TO DO WHAT WAS NORMALLY EXPECTED FROM YOU BECAUSE OF DRINKING: 0
HOW OFTEN DURING THE LAST YEAR HAVE YOU NEEDED AN ALCOHOLIC DRINK FIRST THING IN THE MORNING TO GET YOURSELF GOING AFTER A NIGHT OF HEAVY DRINKING: 0
HAVE YOU OR SOMEONE ELSE BEEN INJURED AS A RESULT OF YOUR DRINKING: 0
AUDIT TOTAL SCORE: 2
HOW OFTEN DO YOU HAVE SIX OR MORE DRINKS ON ONE OCCASION: 0
AUDIT-C TOTAL SCORE: 2

## 2020-10-02 NOTE — ASSESSMENT & PLAN NOTE
Status post orthopedic evaluation. Should the symptoms bother her enough she may be a candidate for TONEY. Doing okay at this time.

## 2020-10-02 NOTE — PROGRESS NOTES
3\" (1.6 m)   Wt 172 lb (78 kg)   BMI 30.47 kg/m²      Physical Exam  Constitutional:       General: She is not in acute distress. Appearance: Normal appearance. She is well-developed. She is not diaphoretic. HENT:      Head: Normocephalic and atraumatic. Right Ear: External ear normal.      Left Ear: External ear normal.      Mouth/Throat:      Pharynx: No oropharyngeal exudate or posterior oropharyngeal erythema. Eyes:      General: No scleral icterus. Right eye: No discharge. Left eye: No discharge. Extraocular Movements: Extraocular movements intact. Pupils: Pupils are equal, round, and reactive to light. Neck:      Musculoskeletal: Normal range of motion and neck supple. Thyroid: No thyromegaly. Vascular: No carotid bruit or JVD. Trachea: No tracheal deviation. Cardiovascular:      Rate and Rhythm: Normal rate and regular rhythm. Pulses: Normal pulses. Carotid pulses are 2+ on the right side and 2+ on the left side. Radial pulses are 2+ on the right side and 2+ on the left side. Posterior tibial pulses are 2+ on the right side and 2+ on the left side. Heart sounds: Normal heart sounds. No murmur. No gallop. Comments: Occasional PAC  Pulmonary:      Effort: Pulmonary effort is normal. No respiratory distress. Breath sounds: Normal breath sounds. No stridor. No wheezing or rales. Abdominal:      General: Bowel sounds are normal. There is no distension or abdominal bruit. Palpations: Abdomen is soft. There is no hepatomegaly, splenomegaly or mass. Tenderness: There is no abdominal tenderness. Musculoskeletal:      Right lower leg: No edema. Left lower leg: No edema. Lymphadenopathy:      Head:      Right side of head: No submandibular adenopathy. Left side of head: No submandibular adenopathy. Cervical: No cervical adenopathy.       Upper Body:      Right upper body: No supraclavicular adenopathy. Left upper body: No supraclavicular adenopathy. Skin:     Coloration: Skin is not jaundiced or pale. Neurological:      General: No focal deficit present. Mental Status: She is alert and oriented to person, place, and time. Cranial Nerves: No cranial nerve deficit. Motor: No weakness, tremor or abnormal muscle tone. Coordination: Coordination normal.      Gait: Gait normal.      Deep Tendon Reflexes:      Reflex Scores:       Bicep reflexes are 2+ on the right side and 2+ on the left side. Patellar reflexes are 2+ on the right side and 2+ on the left side. Comments: No focal signs  SLR negative   Psychiatric:         Attention and Perception: Attention normal.         Mood and Affect: Mood normal.         Speech: Speech normal.         Behavior: Behavior normal.         Thought Content: Thought content normal.         Cognition and Memory: Cognition normal.         Judgment: Judgment normal.         ASSESSMENT:       Encounter Diagnoses   Name Primary?  Routine general medical examination at a health care facility Yes    Cough     Preventative health care     Degenerative disc disease, lumbar     Arthritis     Osteopenia, unspecified location     Hyperlipidemia, unspecified hyperlipidemia type     Peptic ulcer     Essential hypertension        Preventative health care  Routine checkup. Flu shot today. Labs reviewed. Cough  Suspect morning dry throat. No other symptoms. Check chest x-ray. Degenerative disc disease, lumbar  Status post orthopedic evaluation. Should the symptoms bother her enough she may be a candidate for TONEY. Doing okay at this time. Arthritis  Meloxicam helps. Tolerating well. No GI effects. Osteopenia  Noted on recent DEXA-mostly in hips. No Rx. Recheck in 2 years. Hyperlipidemia  Mild elevation. . HDL 57. No Rx. Peptic ulcer  No symptoms-continue PPI.   Tolerating meloxicam as well.    Hypertension  BP okay-continue same Rx. PLAN:See ASSESSMENT for evaluation & PLAN     Orders Placed This Encounter   Procedures    XR CHEST (2 VW)     Standing Status:   Future     Standing Expiration Date:   10/2/2021     , PMH, SH and FH reviewed and noted. Recent and past labs, tests and consultsalso reviewed. Recent or new meds also reviewed.

## 2020-10-02 NOTE — PATIENT INSTRUCTIONS
Personalized Preventive Plan for Silver Beckham - 10/2/2020  Medicare offers a range of preventive health benefits. Some of the tests and screenings are paid in full while other may be subject to a deductible, co-insurance, and/or copay. Some of these benefits include a comprehensive review of your medical history including lifestyle, illnesses that may run in your family, and various assessments and screenings as appropriate. After reviewing your medical record and screening and assessments performed today your provider may have ordered immunizations, labs, imaging, and/or referrals for you. A list of these orders (if applicable) as well as your Preventive Care list are included within your After Visit Summary for your review. Other Preventive Recommendations:    · A preventive eye exam performed by an eye specialist is recommended every 1-2 years to screen for glaucoma; cataracts, macular degeneration, and other eye disorders. · A preventive dental visit is recommended every 6 months. · Try to get at least 150 minutes of exercise per week or 10,000 steps per day on a pedometer . · Order or download the FREE \"Exercise & Physical Activity: Your Everyday Guide\" from [employer]. Call 7-499.480.1564 or search [employer] online. · You need 2733-8999 mg of calcium and 4715-8190 IU of vitamin D per day. It is possible to meet your calcium requirement with diet alone, but a vitamin D supplement is usually necessary to meet this goal.  · When exposed to the sun, use a sunscreen that protects against both UVA and UVB radiation with an SPF of 30 or greater. Reapply every 2 to 3 hours or after sweating, drying off with a towel, or swimming. · Always wear a seat belt when traveling in a car. Always wear a helmet when riding a bicycle or motorcycle.

## 2020-10-02 NOTE — PROGRESS NOTES
Vaccine Information Sheet, \"Influenza - Inactivated\"  given to Mehnaz Gonzalez, or parent/legal guardian of  Mehnaz Gonzalez and verbalized understanding. Patient responses:    Have you ever had a reaction to a flu vaccine? No  Do you have any current illness? No  Have you ever had Guillian [place] Syndrome? No  Do you have a serious allergy to any of the follow: Neomycin, Polymyxin, Thimerosal, eggs or egg products? No    Flu vaccine given per order. Please see immunization tab. Risks and benefits explained. Current VIS given.

## 2020-10-20 RX ORDER — AMLODIPINE BESYLATE 5 MG/1
5 TABLET ORAL NIGHTLY
Qty: 90 TABLET | Refills: 1 | Status: SHIPPED | OUTPATIENT
Start: 2020-10-20 | End: 2021-04-16

## 2020-10-27 RX ORDER — MELOXICAM 15 MG/1
TABLET ORAL
Qty: 90 TABLET | Refills: 1 | Status: SHIPPED | OUTPATIENT
Start: 2020-10-27 | End: 2021-04-23

## 2020-11-01 PROBLEM — R05.9 COUGH: Status: RESOLVED | Noted: 2020-10-02 | Resolved: 2020-11-01

## 2020-11-01 PROBLEM — Z00.00 PREVENTATIVE HEALTH CARE: Status: RESOLVED | Noted: 2018-09-28 | Resolved: 2020-11-01

## 2020-12-22 RX ORDER — LOSARTAN POTASSIUM AND HYDROCHLOROTHIAZIDE 12.5; 1 MG/1; MG/1
TABLET ORAL
Qty: 90 TABLET | Refills: 1 | Status: SHIPPED | OUTPATIENT
Start: 2020-12-22 | End: 2021-06-14

## 2021-04-16 RX ORDER — AMLODIPINE BESYLATE 5 MG/1
TABLET ORAL
Qty: 90 TABLET | Refills: 1 | Status: SHIPPED | OUTPATIENT
Start: 2021-04-16 | End: 2021-06-22 | Stop reason: DRUGHIGH

## 2021-04-19 ENCOUNTER — HOSPITAL ENCOUNTER (OUTPATIENT)
Dept: WOMENS IMAGING | Age: 71
Discharge: HOME | End: 2021-04-19
Payer: MEDICARE

## 2021-04-19 DIAGNOSIS — Z12.31 VISIT FOR SCREENING MAMMOGRAM: ICD-10-CM

## 2021-04-19 PROCEDURE — 77063 BREAST TOMOSYNTHESIS BI: CPT

## 2021-04-23 RX ORDER — MELOXICAM 15 MG/1
TABLET ORAL
Qty: 90 TABLET | Refills: 1 | Status: SHIPPED | OUTPATIENT
Start: 2021-04-23 | End: 2021-10-15

## 2021-06-14 RX ORDER — LOSARTAN POTASSIUM AND HYDROCHLOROTHIAZIDE 12.5; 1 MG/1; MG/1
TABLET ORAL
Qty: 90 TABLET | Refills: 1 | Status: SHIPPED | OUTPATIENT
Start: 2021-06-14 | End: 2021-09-14

## 2021-06-21 ENCOUNTER — NURSE TRIAGE (OUTPATIENT)
Dept: OTHER | Facility: CLINIC | Age: 71
End: 2021-06-21

## 2021-06-21 NOTE — TELEPHONE ENCOUNTER
Reason for Disposition   Systolic BP >= 466 OR Diastolic >= 061    Answer Assessment - Initial Assessment Questions  1. DESCRIPTION: \"Describe your dizziness. \"      Feels woozy when lying down    2. LIGHTHEADED: \"Do you feel lightheaded? \" (e.g., somewhat faint, woozy, weak upon standing)      Wooziness when lying down only - unable to sleep well    3. VERTIGO: \"Do you feel like either you or the room is spinning or tilting? \" (i.e. vertigo)      No spinning or tilting    4. SEVERITY: \"How bad is it? \"  \"Do you feel like you are going to faint? \" \"Can you stand and walk? \"    - MILD - walking normally    - MODERATE - interferes with normal activities (e.g., work, school)     - SEVERE - unable to stand, requires support to walk, feels like passing out now. Mild - only effects her when lying down    5. ONSET:  \"When did the dizziness begin? \"      Onset was middle of the night Fri - Sat    6. AGGRAVATING FACTORS: \"Does anything make it worse? \" (e.g., standing, change in head position)      Lying down; when she tilts her head back to put in eye drops; bending over causes dizziness    7. HEART RATE: \"Can you tell me your heart rate? \" \"How many beats in 15 seconds? \"  (Note: not all patients can do this)        Heart rate last 3 days, 69, 61, 62    8. CAUSE: \"What do you think is causing the dizziness? \"      She is unsure    9. RECURRENT SYMPTOM: \"Have you had dizziness before? \" If so, ask: \"When was the last time? \" \"What happened that time? \"      No    10. OTHER SYMPTOMS: \"Do you have any other symptoms? \" (e.g., fever, chest pain, vomiting, diarrhea, bleeding)        Dizziness only    11. PREGNANCY: \"Is there any chance you are pregnant? \" \"When was your last menstrual period? \"        NO    Answer Assessment - Initial Assessment Questions  1. BLOOD PRESSURE: \"What is the blood pressure? \" \"Did you take at least two measurements 5 minutes apart? \"      Sat - 175/98, 124/65, Sun 142/76, Mon 161/88, 172/84    2.  ONSET: \"When did you take your blood pressure? \"      Everyday for the last 3 days    3. HOW: \"How did you obtain the blood pressure? \" (e.g., visiting nurse, automatic home BP monitor)      Via an automatic arm cuff    4. HISTORY: \"Do you have a history of high blood pressure? \"      Yes    5. MEDICATIONS: Metta Huddle you taking any medications for blood pressure? \" \"Have you missed any doses recently? \"      Takes losartan and amlodipine    6. OTHER SYMPTOMS: \"Do you have any symptoms? \" (e.g., headache, chest pain, blurred vision, difficulty breathing, weakness)      Dizziness    7. PREGNANCY: \"Is there any chance you are pregnant? \" \"When was your last menstrual period? \"      No    Protocols used: HIGH BLOOD PRESSURE-ADULT-OH, DIZZINESS-ADULT-OH    Call came in from [facility] at the [facility]; patient is seen at [facility] location    See triage above:  Triage indicates that the patient should be seen by her provider in the next 3 days. Provided care advice: rise slowly due to dizziness. Transfer to [place] at the [facility] for scheduling.

## 2021-06-22 ENCOUNTER — OFFICE VISIT (OUTPATIENT)
Dept: INTERNAL MEDICINE CLINIC | Age: 71
End: 2021-06-22
Payer: MEDICARE

## 2021-06-22 VITALS
HEIGHT: 63 IN | BODY MASS INDEX: 29.95 KG/M2 | DIASTOLIC BLOOD PRESSURE: 80 MMHG | SYSTOLIC BLOOD PRESSURE: 148 MMHG | WEIGHT: 169 LBS

## 2021-06-22 DIAGNOSIS — R42 VERTIGO: ICD-10-CM

## 2021-06-22 DIAGNOSIS — M47.812 NECK ARTHRITIS: ICD-10-CM

## 2021-06-22 DIAGNOSIS — I10 ESSENTIAL HYPERTENSION: ICD-10-CM

## 2021-06-22 PROCEDURE — 1036F TOBACCO NON-USER: CPT | Performed by: INTERNAL MEDICINE

## 2021-06-22 PROCEDURE — 1090F PRES/ABSN URINE INCON ASSESS: CPT | Performed by: INTERNAL MEDICINE

## 2021-06-22 PROCEDURE — G8399 PT W/DXA RESULTS DOCUMENT: HCPCS | Performed by: INTERNAL MEDICINE

## 2021-06-22 PROCEDURE — 99214 OFFICE O/P EST MOD 30 MIN: CPT | Performed by: INTERNAL MEDICINE

## 2021-06-22 PROCEDURE — 4040F PNEUMOC VAC/ADMIN/RCVD: CPT | Performed by: INTERNAL MEDICINE

## 2021-06-22 PROCEDURE — 3017F COLORECTAL CA SCREEN DOC REV: CPT | Performed by: INTERNAL MEDICINE

## 2021-06-22 PROCEDURE — G8427 DOCREV CUR MEDS BY ELIG CLIN: HCPCS | Performed by: INTERNAL MEDICINE

## 2021-06-22 PROCEDURE — G8417 CALC BMI ABV UP PARAM F/U: HCPCS | Performed by: INTERNAL MEDICINE

## 2021-06-22 PROCEDURE — 1123F ACP DISCUSS/DSCN MKR DOCD: CPT | Performed by: INTERNAL MEDICINE

## 2021-06-22 RX ORDER — MECLIZINE HYDROCHLORIDE 25 MG/1
25 TABLET ORAL 3 TIMES DAILY PRN
Qty: 20 TABLET | Refills: 0 | Status: SHIPPED | OUTPATIENT
Start: 2021-06-22 | End: 2021-06-29

## 2021-06-22 RX ORDER — AMLODIPINE BESYLATE 10 MG/1
10 TABLET ORAL DAILY
Qty: 90 TABLET | Refills: 3 | Status: SHIPPED | OUTPATIENT
Start: 2021-06-22 | End: 2021-07-26 | Stop reason: DRUGHIGH

## 2021-06-22 SDOH — ECONOMIC STABILITY: FOOD INSECURITY: WITHIN THE PAST 12 MONTHS, THE FOOD YOU BOUGHT JUST DIDN'T LAST AND YOU DIDN'T HAVE MONEY TO GET MORE.: NEVER TRUE

## 2021-06-22 SDOH — ECONOMIC STABILITY: FOOD INSECURITY: WITHIN THE PAST 12 MONTHS, YOU WORRIED THAT YOUR FOOD WOULD RUN OUT BEFORE YOU GOT MONEY TO BUY MORE.: NEVER TRUE

## 2021-06-22 ASSESSMENT — SOCIAL DETERMINANTS OF HEALTH (SDOH): HOW HARD IS IT FOR YOU TO PAY FOR THE VERY BASICS LIKE FOOD, HOUSING, MEDICAL CARE, AND HEATING?: NOT HARD AT ALL

## 2021-06-22 ASSESSMENT — ENCOUNTER SYMPTOMS: NAUSEA: 0

## 2021-06-22 NOTE — ASSESSMENT & PLAN NOTE
more cracking and popping in her neck the last several weeks. Particularly on the right side. No evaluation at this time due to her vertigo.

## 2021-06-22 NOTE — PROGRESS NOTES
Coordination: Coordination is intact. Romberg sign negative. Comments: Positive Zaira-Hallpike bilaterally         ASSESSMENT:       Encounter Diagnoses   Name Primary?  Vertigo     Essential hypertension     Neck arthritis C5C6-C7        Vertigo    similar past episodesnot had symptoms several years. Meclizine Rx. Stay hydrated. Avoid salty foods. Caution. Call or follow-up if any new symptoms. No reason for MRI or other evaluations at this time. Hypertension    BP elevated last several days. It could be a reaction to her anxiety regarding the vertigo. Nonetheless we will increase amlodipine to 10 mg. She will monitor BP and follow-up in a month. Neck arthritis C5C6-C7    more cracking and popping in her neck the last several weeks. Particularly on the right side. No evaluation at this time due to her vertigo. PLAN:See ASSESSMENT for evaluation & PLAN     No orders of the defined types were placed in this encounter.    , PMH, SH and FH reviewed and noted. Recent and past labs, tests and consultsalso reviewed. Recent or new meds also reviewed.

## 2021-06-22 NOTE — ASSESSMENT & PLAN NOTE
BP elevated last several days. It could be a reaction to her anxiety regarding the vertigo. Nonetheless we will increase amlodipine to 10 mg. She will monitor BP and follow-up in a month.

## 2021-06-22 NOTE — ASSESSMENT & PLAN NOTE
similar past episodesnot had symptoms several years. Meclizine Rx. Stay hydrated. Avoid salty foods. Caution. Call or follow-up if any new symptoms. No reason for MRI or other evaluations at this time.

## 2021-06-29 RX ORDER — MECLIZINE HYDROCHLORIDE 25 MG/1
TABLET ORAL
Qty: 20 TABLET | Refills: 0 | Status: SHIPPED | OUTPATIENT
Start: 2021-06-29 | End: 2022-10-17 | Stop reason: ALTCHOICE

## 2021-07-26 ENCOUNTER — OFFICE VISIT (OUTPATIENT)
Dept: INTERNAL MEDICINE CLINIC | Age: 71
End: 2021-07-26
Payer: MEDICARE

## 2021-07-26 VITALS
BODY MASS INDEX: 30.73 KG/M2 | WEIGHT: 167 LBS | DIASTOLIC BLOOD PRESSURE: 68 MMHG | SYSTOLIC BLOOD PRESSURE: 128 MMHG | HEIGHT: 62 IN

## 2021-07-26 DIAGNOSIS — R73.9 HYPERGLYCEMIA: ICD-10-CM

## 2021-07-26 DIAGNOSIS — R42 VERTIGO: ICD-10-CM

## 2021-07-26 DIAGNOSIS — K27.9 PEPTIC ULCER: ICD-10-CM

## 2021-07-26 DIAGNOSIS — M47.812 NECK ARTHRITIS: ICD-10-CM

## 2021-07-26 DIAGNOSIS — M19.90 ARTHRITIS: ICD-10-CM

## 2021-07-26 DIAGNOSIS — E78.5 HYPERLIPIDEMIA, UNSPECIFIED HYPERLIPIDEMIA TYPE: ICD-10-CM

## 2021-07-26 DIAGNOSIS — I10 ESSENTIAL HYPERTENSION: Primary | ICD-10-CM

## 2021-07-26 DIAGNOSIS — E55.9 VITAMIN D DEFICIENCY: ICD-10-CM

## 2021-07-26 PROCEDURE — 1036F TOBACCO NON-USER: CPT | Performed by: INTERNAL MEDICINE

## 2021-07-26 PROCEDURE — 3017F COLORECTAL CA SCREEN DOC REV: CPT | Performed by: INTERNAL MEDICINE

## 2021-07-26 PROCEDURE — 99214 OFFICE O/P EST MOD 30 MIN: CPT | Performed by: INTERNAL MEDICINE

## 2021-07-26 PROCEDURE — G8399 PT W/DXA RESULTS DOCUMENT: HCPCS | Performed by: INTERNAL MEDICINE

## 2021-07-26 PROCEDURE — 1123F ACP DISCUSS/DSCN MKR DOCD: CPT | Performed by: INTERNAL MEDICINE

## 2021-07-26 PROCEDURE — 4040F PNEUMOC VAC/ADMIN/RCVD: CPT | Performed by: INTERNAL MEDICINE

## 2021-07-26 PROCEDURE — G8417 CALC BMI ABV UP PARAM F/U: HCPCS | Performed by: INTERNAL MEDICINE

## 2021-07-26 PROCEDURE — G8427 DOCREV CUR MEDS BY ELIG CLIN: HCPCS | Performed by: INTERNAL MEDICINE

## 2021-07-26 PROCEDURE — 1090F PRES/ABSN URINE INCON ASSESS: CPT | Performed by: INTERNAL MEDICINE

## 2021-07-26 RX ORDER — AMLODIPINE BESYLATE 5 MG/1
5 TABLET ORAL DAILY
Qty: 90 TABLET | Refills: 3 | Status: SHIPPED | OUTPATIENT
Start: 2021-07-26 | End: 2022-08-30

## 2021-07-26 ASSESSMENT — ENCOUNTER SYMPTOMS: SHORTNESS OF BREATH: 0

## 2021-07-26 NOTE — ASSESSMENT & PLAN NOTE
symptoms resolved and then returned to a much lesser degree. Mostly when she rolls over in bed. Doubt central origin. Continue to monitor symptoms and will do MRI if symptoms do not completely resolve or if they worsen especially with other neurologic symptoms.

## 2021-07-26 NOTE — ASSESSMENT & PLAN NOTE
BP may be too low at home. She has multiple readings at her in the low 100s. Will decrease amlodipine back to 5 mg. She also has some edema from the higher dose amlodipine.

## 2021-07-26 NOTE — PROGRESS NOTES
SUBJECTIVE:  Patient ID: Blanquita Rehman is an 79 y.o. female. HPI: Patient here today for the f/u of chronic problems-- see Problem List and associated comments. New issues or complaints include (also see Assessment for more details): Here to review her blood pressure and edema and vertigo. Review of Systems   HENT: Negative for ear pain and hearing loss. Eyes: Negative for visual disturbance. Respiratory: Negative for shortness of breath. Cardiovascular: Positive for leg swelling (Since increasing amlodipine to 10 mg). Musculoskeletal: Positive for arthralgias. Neurological: Positive for dizziness. Negative for headaches. OBJECTIVE:    /68 (Site: Right Upper Arm)   Ht 5' 2\" (1.575 m)   Wt 167 lb (75.8 kg)   BMI 30.54 kg/m²      Physical Exam  Constitutional:       General: She is not in acute distress. Appearance: She is not ill-appearing. HENT:      Right Ear: External ear normal.      Left Ear: External ear normal.   Eyes:      Extraocular Movements: Extraocular movements intact. Comments: No nystagmus   Neck:      Vascular: No carotid bruit. Cardiovascular:      Rate and Rhythm: Normal rate and regular rhythm. Pulses:           Radial pulses are 2+ on the right side and 2+ on the left side. Heart sounds: Normal heart sounds. Pulmonary:      Effort: Pulmonary effort is normal. No respiratory distress. Musculoskeletal:      Cervical back: Normal range of motion and neck supple. Right lower le+ Edema present. Left lower le+ Edema present. Neurological:      General: No focal deficit present. Mental Status: She is oriented to person, place, and time. Cranial Nerves: Cranial nerves are intact. Motor: Motor function is intact. Coordination: Coordination is intact. Gait: Gait is intact.       Comments: Mildly positive Lynbrook-Hallpike bilaterally   Psychiatric:         Mood and Affect: Mood normal.         Thought Content: Thought content normal.         Judgment: Judgment normal.         ASSESSMENT:       Encounter Diagnoses   Name Primary?  Essential hypertension Yes    Hyperlipidemia, unspecified hyperlipidemia type     Vitamin D deficiency     Hyperglycemia     Peptic ulcer     Vertigo     Neck arthritis C5C6-C7     Arthritis        Hypertension    BP may be too low at home. She has multiple readings at her in the low 100s. Will decrease amlodipine back to 5 mg. She also has some edema from the higher dose amlodipine. Peptic ulcer    asymptomatic for several years. Continue PPI, especially while on an NSAID. Vertigo    symptoms resolved and then returned to a much lesser degree. Mostly when she rolls over in bed. Doubt central origin. Continue to monitor symptoms and will do MRI if symptoms do not completely resolve or if they worsen especially with other neurologic symptoms. Neck arthritis C5C6-C7    mostly bothers her when she hyperextends her neck looking skyward at mediators. Arthritis    she held her meloxicam for a couple weeks and arthritic symptoms return. She is now back on the meloxicam 15 mg and she is doing much better. She may try half a tablet every day.         PLAN:See ASSESSMENT for evaluation & PLAN     Orders Placed This Encounter   Procedures    CBC Auto Differential     Standing Status:   Future     Standing Expiration Date:   7/26/2022    Comprehensive Metabolic Panel     Standing Status:   Future     Standing Expiration Date:   7/26/2022    Lipid Panel     Standing Status:   Future     Standing Expiration Date:   7/26/2022     Order Specific Question:   Is Patient Fasting?/# of Hours     Answer:   yes - 8 hours    Hemoglobin A1C     Standing Status:   Future     Standing Expiration Date:   7/26/2022    TSH WITH REFLEX TO FT4     Standing Status:   Future     Standing Expiration Date:   7/26/2022    Vitamin D 25 Hydroxy     Standing Status:   Future     Standing

## 2021-07-26 NOTE — ASSESSMENT & PLAN NOTE
she held her meloxicam for a couple weeks and arthritic symptoms return. She is now back on the meloxicam 15 mg and she is doing much better. She may try half a tablet every day.

## 2021-09-14 RX ORDER — LOSARTAN POTASSIUM AND HYDROCHLOROTHIAZIDE 12.5; 1 MG/1; MG/1
TABLET ORAL
Qty: 90 TABLET | Refills: 1 | Status: SHIPPED | OUTPATIENT
Start: 2021-09-14 | End: 2022-06-03

## 2021-10-07 LAB
A/G RATIO: 1.6 (ref 1.1–2.2)
ALBUMIN SERPL-MCNC: 4.2 G/DL (ref 3.4–5)
ALP BLD-CCNC: 99 U/L (ref 40–129)
ALT SERPL-CCNC: 12 U/L (ref 10–40)
ANION GAP SERPL CALCULATED.3IONS-SCNC: 11 MMOL/L (ref 3–16)
AST SERPL-CCNC: 19 U/L (ref 15–37)
BASOPHILS ABSOLUTE: 0 K/UL (ref 0–0.2)
BASOPHILS RELATIVE PERCENT: 1.3 %
BILIRUB SERPL-MCNC: 0.4 MG/DL (ref 0–1)
BILIRUBIN URINE: NEGATIVE
BLOOD, URINE: NEGATIVE
BUN BLDV-MCNC: 16 MG/DL (ref 7–20)
CALCIUM SERPL-MCNC: 9.6 MG/DL (ref 8.3–10.6)
CHLORIDE BLD-SCNC: 102 MMOL/L (ref 99–110)
CHOLESTEROL, FASTING: 221 MG/DL (ref 0–199)
CLARITY: CLEAR
CO2: 26 MMOL/L (ref 21–32)
COLOR: YELLOW
CREAT SERPL-MCNC: 0.8 MG/DL (ref 0.6–1.2)
EOSINOPHILS ABSOLUTE: 0.3 K/UL (ref 0–0.6)
EOSINOPHILS RELATIVE PERCENT: 7.3 %
GFR AFRICAN AMERICAN: >60
GFR NON-AFRICAN AMERICAN: >60
GLOBULIN: 2.6 G/DL
GLUCOSE FASTING: 104 MG/DL (ref 70–99)
GLUCOSE URINE: NEGATIVE MG/DL
HCT VFR BLD CALC: 39.7 % (ref 36–48)
HDLC SERPL-MCNC: 56 MG/DL (ref 40–60)
HEMOGLOBIN: 13.2 G/DL (ref 12–16)
KETONES, URINE: NEGATIVE MG/DL
LDL CHOLESTEROL CALCULATED: 141 MG/DL
LEUKOCYTE ESTERASE, URINE: NEGATIVE
LYMPHOCYTES ABSOLUTE: 1 K/UL (ref 1–5.1)
LYMPHOCYTES RELATIVE PERCENT: 26.3 %
MCH RBC QN AUTO: 32.1 PG (ref 26–34)
MCHC RBC AUTO-ENTMCNC: 33.3 G/DL (ref 31–36)
MCV RBC AUTO: 96.2 FL (ref 80–100)
MICROSCOPIC EXAMINATION: NORMAL
MONOCYTES ABSOLUTE: 0.4 K/UL (ref 0–1.3)
MONOCYTES RELATIVE PERCENT: 10.1 %
NEUTROPHILS ABSOLUTE: 2.1 K/UL (ref 1.7–7.7)
NEUTROPHILS RELATIVE PERCENT: 55 %
NITRITE, URINE: NEGATIVE
PDW BLD-RTO: 13.3 % (ref 12.4–15.4)
PH UA: 7.5 (ref 5–8)
PLATELET # BLD: 239 K/UL (ref 135–450)
PMV BLD AUTO: 9.6 FL (ref 5–10.5)
POTASSIUM SERPL-SCNC: 4.3 MMOL/L (ref 3.5–5.1)
PROTEIN UA: NEGATIVE MG/DL
RBC # BLD: 4.13 M/UL (ref 4–5.2)
SODIUM BLD-SCNC: 139 MMOL/L (ref 136–145)
SPECIFIC GRAVITY UA: 1.02 (ref 1–1.03)
TOTAL PROTEIN: 6.8 G/DL (ref 6.4–8.2)
TRIGLYCERIDE, FASTING: 118 MG/DL (ref 0–150)
URINE REFLEX TO CULTURE: NORMAL
URINE TYPE: NORMAL
UROBILINOGEN, URINE: 0.2 E.U./DL
VLDLC SERPL CALC-MCNC: 24 MG/DL
WBC # BLD: 3.8 K/UL (ref 4–11)

## 2021-10-08 LAB
ESTIMATED AVERAGE GLUCOSE: 108.3 MG/DL
HBA1C MFR BLD: 5.4 %
TSH REFLEX FT4: 2.67 UIU/ML (ref 0.27–4.2)

## 2021-10-10 LAB — VITAMIN D 1,25-DIHYDROXY: 57.8 PG/ML (ref 19.9–79.3)

## 2021-10-13 ENCOUNTER — OFFICE VISIT (OUTPATIENT)
Dept: INTERNAL MEDICINE CLINIC | Age: 71
End: 2021-10-13
Payer: MEDICARE

## 2021-10-13 ENCOUNTER — HOSPITAL ENCOUNTER (OUTPATIENT)
Age: 71
Discharge: HOME | End: 2021-10-13
Payer: MEDICARE

## 2021-10-13 ENCOUNTER — HOSPITAL ENCOUNTER (OUTPATIENT)
Dept: GENERAL RADIOLOGY | Age: 71
Discharge: HOME | End: 2021-10-13
Payer: MEDICARE

## 2021-10-13 VITALS
HEIGHT: 62 IN | DIASTOLIC BLOOD PRESSURE: 72 MMHG | BODY MASS INDEX: 31.1 KG/M2 | WEIGHT: 169 LBS | SYSTOLIC BLOOD PRESSURE: 136 MMHG

## 2021-10-13 DIAGNOSIS — M51.36 DEGENERATIVE DISC DISEASE, LUMBAR: ICD-10-CM

## 2021-10-13 DIAGNOSIS — I10 PRIMARY HYPERTENSION: ICD-10-CM

## 2021-10-13 DIAGNOSIS — M19.90 ARTHRITIS: ICD-10-CM

## 2021-10-13 DIAGNOSIS — Z23 NEED FOR INFLUENZA VACCINATION: ICD-10-CM

## 2021-10-13 DIAGNOSIS — L65.9 HAIR LOSS: ICD-10-CM

## 2021-10-13 DIAGNOSIS — K27.9 PEPTIC ULCER: ICD-10-CM

## 2021-10-13 DIAGNOSIS — Z00.00 PREVENTATIVE HEALTH CARE: Primary | ICD-10-CM

## 2021-10-13 DIAGNOSIS — Z00.00 ROUTINE GENERAL MEDICAL EXAMINATION AT A HEALTH CARE FACILITY: ICD-10-CM

## 2021-10-13 DIAGNOSIS — R49.0 HOARSENESS OF VOICE: ICD-10-CM

## 2021-10-13 DIAGNOSIS — E78.5 HYPERLIPIDEMIA, UNSPECIFIED HYPERLIPIDEMIA TYPE: ICD-10-CM

## 2021-10-13 PROBLEM — R13.12 OROPHARYNGEAL DYSPHAGIA: Status: RESOLVED | Noted: 2018-09-28 | Resolved: 2021-10-13

## 2021-10-13 PROCEDURE — 3017F COLORECTAL CA SCREEN DOC REV: CPT | Performed by: INTERNAL MEDICINE

## 2021-10-13 PROCEDURE — 1123F ACP DISCUSS/DSCN MKR DOCD: CPT | Performed by: INTERNAL MEDICINE

## 2021-10-13 PROCEDURE — G0008 ADMIN INFLUENZA VIRUS VAC: HCPCS | Performed by: INTERNAL MEDICINE

## 2021-10-13 PROCEDURE — G8484 FLU IMMUNIZE NO ADMIN: HCPCS | Performed by: INTERNAL MEDICINE

## 2021-10-13 PROCEDURE — 90694 VACC AIIV4 NO PRSRV 0.5ML IM: CPT | Performed by: INTERNAL MEDICINE

## 2021-10-13 PROCEDURE — 4040F PNEUMOC VAC/ADMIN/RCVD: CPT | Performed by: INTERNAL MEDICINE

## 2021-10-13 PROCEDURE — 72100 X-RAY EXAM L-S SPINE 2/3 VWS: CPT

## 2021-10-13 PROCEDURE — G0439 PPPS, SUBSEQ VISIT: HCPCS | Performed by: INTERNAL MEDICINE

## 2021-10-13 RX ORDER — SPIRONOLACTONE 25 MG/1
25 TABLET ORAL DAILY
Qty: 30 TABLET | Refills: 5 | Status: SHIPPED | OUTPATIENT
Start: 2021-10-13 | End: 2022-01-13

## 2021-10-13 ASSESSMENT — ENCOUNTER SYMPTOMS
RESPIRATORY NEGATIVE: 1
TROUBLE SWALLOWING: 0
BACK PAIN: 1
VOICE CHANGE: 1
ABDOMINAL PAIN: 0

## 2021-10-13 ASSESSMENT — LIFESTYLE VARIABLES
HOW OFTEN DO YOU HAVE SIX OR MORE DRINKS ON ONE OCCASION: 0
HOW OFTEN DO YOU HAVE A DRINK CONTAINING ALCOHOL: 2
HAVE YOU OR SOMEONE ELSE BEEN INJURED AS A RESULT OF YOUR DRINKING: 0
HAS A RELATIVE, FRIEND, DOCTOR, OR ANOTHER HEALTH PROFESSIONAL EXPRESSED CONCERN ABOUT YOUR DRINKING OR SUGGESTED YOU CUT DOWN: 0
HOW OFTEN DURING THE LAST YEAR HAVE YOU BEEN UNABLE TO REMEMBER WHAT HAPPENED THE NIGHT BEFORE BECAUSE YOU HAD BEEN DRINKING: 0
AUDIT-C TOTAL SCORE: 2
HOW OFTEN DURING THE LAST YEAR HAVE YOU HAD A FEELING OF GUILT OR REMORSE AFTER DRINKING: 0
HOW MANY STANDARD DRINKS CONTAINING ALCOHOL DO YOU HAVE ON A TYPICAL DAY: 0
AUDIT TOTAL SCORE: 2
HOW OFTEN DURING THE LAST YEAR HAVE YOU FOUND THAT YOU WERE NOT ABLE TO STOP DRINKING ONCE YOU HAD STARTED: 0
HOW OFTEN DURING THE LAST YEAR HAVE YOU FAILED TO DO WHAT WAS NORMALLY EXPECTED FROM YOU BECAUSE OF DRINKING: 0
HOW OFTEN DURING THE LAST YEAR HAVE YOU NEEDED AN ALCOHOLIC DRINK FIRST THING IN THE MORNING TO GET YOURSELF GOING AFTER A NIGHT OF HEAVY DRINKING: 0

## 2021-10-13 ASSESSMENT — PATIENT HEALTH QUESTIONNAIRE - PHQ9
SUM OF ALL RESPONSES TO PHQ9 QUESTIONS 1 & 2: 0
SUM OF ALL RESPONSES TO PHQ QUESTIONS 1-9: 0
2. FEELING DOWN, DEPRESSED OR HOPELESS: 0
1. LITTLE INTEREST OR PLEASURE IN DOING THINGS: 0

## 2021-10-13 NOTE — ASSESSMENT & PLAN NOTE
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 Doing well with meloxicam.  Consider rheumatoid labs or work-up in future.

## 2021-10-13 NOTE — PROGRESS NOTES
SUBJECTIVE:  Patient ID: Tesfaye Cast is an 79 y.o. female. HPI: Patient here today for the f/u of chronic problems-- see Problem List and associated comments. New issues or complaints include (also see Assessment for more details):      Review of Systems   Constitutional: Negative for unexpected weight change. HENT: Positive for voice change. Negative for trouble swallowing. Respiratory: Negative. Cardiovascular: Negative. Gastrointestinal: Negative for abdominal pain. Genitourinary: Negative for difficulty urinating. Musculoskeletal: Positive for arthralgias and back pain. Allergic/Immunologic: Negative for immunocompromised state. Neurological: Negative. Hematological: Negative. Psychiatric/Behavioral: Negative. OBJECTIVE:    /72 (Site: Right Upper Arm)   Ht 5' 2\" (1.575 m)   Wt 169 lb (76.7 kg)   BMI 30.91 kg/m²      Physical Exam  Constitutional:       General: She is not in acute distress. Appearance: Normal appearance. She is well-developed. She is not diaphoretic. HENT:      Head: Normocephalic and atraumatic. Right Ear: External ear normal.      Left Ear: External ear normal.      Mouth/Throat:      Pharynx: No oropharyngeal exudate or posterior oropharyngeal erythema. Eyes:      General: No scleral icterus. Right eye: No discharge. Left eye: No discharge. Extraocular Movements: Extraocular movements intact. Pupils: Pupils are equal, round, and reactive to light. Neck:      Thyroid: No thyromegaly. Vascular: No carotid bruit or JVD. Trachea: No tracheal deviation. Cardiovascular:      Rate and Rhythm: Normal rate and regular rhythm. Pulses: Normal pulses. Carotid pulses are 2+ on the right side and 2+ on the left side. Radial pulses are 2+ on the right side and 2+ on the left side. Posterior tibial pulses are 2+ on the right side and 2+ on the left side.       Heart sounds: Normal heart sounds. No murmur heard. No gallop. Comments: Occasional PAC  Pulmonary:      Effort: Pulmonary effort is normal. No respiratory distress. Breath sounds: Normal breath sounds. No stridor. No wheezing or rales. Abdominal:      General: Bowel sounds are normal. There is no distension or abdominal bruit. Palpations: Abdomen is soft. There is no hepatomegaly, splenomegaly or mass. Tenderness: There is no abdominal tenderness. Musculoskeletal:         General: Tenderness (Right greater trochanter) present. Cervical back: Normal range of motion and neck supple. Right lower leg: No edema. Left lower leg: No edema. Comments: Arthritic changes in handsno significant ulnar deviation. Lymphadenopathy:      Head:      Right side of head: No submandibular adenopathy. Left side of head: No submandibular adenopathy. Cervical: No cervical adenopathy. Upper Body:      Right upper body: No supraclavicular adenopathy. Left upper body: No supraclavicular adenopathy. Skin:     Coloration: Skin is not jaundiced or pale. Neurological:      General: No focal deficit present. Mental Status: She is alert and oriented to person, place, and time. Cranial Nerves: No cranial nerve deficit. Motor: No weakness, tremor or abnormal muscle tone. Coordination: Coordination normal.      Gait: Gait normal.      Deep Tendon Reflexes:      Reflex Scores:       Bicep reflexes are 2+ on the right side and 2+ on the left side. Patellar reflexes are 2+ on the right side and 2+ on the left side. Comments: No focal signs  SLR negative   Psychiatric:         Attention and Perception: Attention normal.         Mood and Affect: Mood normal.         Speech: Speech normal.         Behavior: Behavior normal.         Thought Content:  Thought content normal.         Cognition and Memory: Cognition normal.         Judgment: Judgment normal.         ASSESSMENT: Encounter Diagnoses   Name Primary?  Preventative health care Yes    Need for influenza vaccination     Routine general medical examination at a health care facility     Hoarseness of voice     Primary hypertension     Peptic ulcer     Hyperlipidemia, unspecified hyperlipidemia type     Degenerative disc disease, lumbar     Hair loss     Arthritis        Preventative health care    labs reviewed. Flu shot done. Covid vaccination done. Hypertension    BP doing well on lower dose amlodipine. No edema. Peptic ulcer    no symptomscontinue PPI    Hyperlipidemia    mild elevationno Rx    Degenerative disc disease, lumbar    occasional pain that radiates very quickly from her back to her legs. Also a little bit of right lateral hip pain. She has a history of spondylosis in her lumbar spine. Last x-ray 8 years agoget new x-ray. Consider physiatry evaluation. Hoarseness of voice    get ENT opinion. Hair loss    try low-dose spironolactone. Recheck labs in 2 to 3 months. Arthritis    some early features of rheumatoid her hands. Doing well with meloxicam.  Consider rheumatoid labs or work-up in future. PLAN:See ASSESSMENT for evaluation & PLAN     Orders Placed This Encounter   Procedures    INFLUENZA, QUADV, ADJUVANTED, 72 YRS =, IM, PF, PREFILL SYR, 0.5ML (FLUAD)    Basic Metabolic Panel     Standing Status:   Future     Standing Expiration Date:   10/13/2022     , PMH, SH and FH reviewed and noted. Recent and past labs, tests and consultsalso reviewed. Recent or new meds also reviewed.

## 2021-10-13 NOTE — ASSESSMENT & PLAN NOTE
occasional pain that radiates very quickly from her back to her legs. Also a little bit of right lateral hip pain. She has a history of spondylosis in her lumbar spine. Last x-ray 8 years agoget new x-ray. Consider physiatry evaluation.

## 2021-10-13 NOTE — PATIENT INSTRUCTIONS
Personalized Preventive Plan for Melani Escobar - 10/13/2021  Medicare offers a range of preventive health benefits. Some of the tests and screenings are paid in full while other may be subject to a deductible, co-insurance, and/or copay. Some of these benefits include a comprehensive review of your medical history including lifestyle, illnesses that may run in your family, and various assessments and screenings as appropriate. After reviewing your medical record and screening and assessments performed today your provider may have ordered immunizations, labs, imaging, and/or referrals for you. A list of these orders (if applicable) as well as your Preventive Care list are included within your After Visit Summary for your review. Other Preventive Recommendations:    · A preventive eye exam performed by an eye specialist is recommended every 1-2 years to screen for glaucoma; cataracts, macular degeneration, and other eye disorders. · A preventive dental visit is recommended every 6 months. · Try to get at least 150 minutes of exercise per week or 10,000 steps per day on a pedometer . · Order or download the FREE \"Exercise & Physical Activity: Your Everyday Guide\" from [employer]. Call 9-223.206.7555 or search [employer] online. · You need 0999-8113 mg of calcium and 8936-4714 IU of vitamin D per day. It is possible to meet your calcium requirement with diet alone, but a vitamin D supplement is usually necessary to meet this goal.  · When exposed to the sun, use a sunscreen that protects against both UVA and UVB radiation with an SPF of 30 or greater. Reapply every 2 to 3 hours or after sweating, drying off with a towel, or swimming. · Always wear a seat belt when traveling in a car. Always wear a helmet when riding a bicycle or motorcycle.

## 2021-10-15 DIAGNOSIS — M54.9 BACK PAIN, UNSPECIFIED BACK LOCATION, UNSPECIFIED BACK PAIN LATERALITY, UNSPECIFIED CHRONICITY: Primary | ICD-10-CM

## 2021-10-15 RX ORDER — MELOXICAM 15 MG/1
TABLET ORAL
Qty: 90 TABLET | Refills: 1 | Status: SHIPPED | OUTPATIENT
Start: 2021-10-15 | End: 2022-01-13 | Stop reason: SDUPTHER

## 2021-10-18 ENCOUNTER — OFFICE VISIT (OUTPATIENT)
Dept: ENT CLINIC | Age: 71
End: 2021-10-18
Payer: MEDICARE

## 2021-10-18 VITALS
WEIGHT: 174 LBS | HEART RATE: 69 BPM | SYSTOLIC BLOOD PRESSURE: 143 MMHG | BODY MASS INDEX: 30.83 KG/M2 | HEIGHT: 63 IN | DIASTOLIC BLOOD PRESSURE: 85 MMHG | TEMPERATURE: 97.1 F

## 2021-10-18 DIAGNOSIS — J38.7 PRESBYLARYNGES: ICD-10-CM

## 2021-10-18 DIAGNOSIS — R49.0 HOARSE VOICE QUALITY: ICD-10-CM

## 2021-10-18 DIAGNOSIS — J31.0 CHRONIC RHINITIS: Primary | ICD-10-CM

## 2021-10-18 PROCEDURE — 3017F COLORECTAL CA SCREEN DOC REV: CPT | Performed by: OTOLARYNGOLOGY

## 2021-10-18 PROCEDURE — 1090F PRES/ABSN URINE INCON ASSESS: CPT | Performed by: OTOLARYNGOLOGY

## 2021-10-18 PROCEDURE — G8417 CALC BMI ABV UP PARAM F/U: HCPCS | Performed by: OTOLARYNGOLOGY

## 2021-10-18 PROCEDURE — G8484 FLU IMMUNIZE NO ADMIN: HCPCS | Performed by: OTOLARYNGOLOGY

## 2021-10-18 PROCEDURE — 1036F TOBACCO NON-USER: CPT | Performed by: OTOLARYNGOLOGY

## 2021-10-18 PROCEDURE — 1123F ACP DISCUSS/DSCN MKR DOCD: CPT | Performed by: OTOLARYNGOLOGY

## 2021-10-18 PROCEDURE — G8399 PT W/DXA RESULTS DOCUMENT: HCPCS | Performed by: OTOLARYNGOLOGY

## 2021-10-18 PROCEDURE — 31231 NASAL ENDOSCOPY DX: CPT | Performed by: OTOLARYNGOLOGY

## 2021-10-18 PROCEDURE — 4040F PNEUMOC VAC/ADMIN/RCVD: CPT | Performed by: OTOLARYNGOLOGY

## 2021-10-18 PROCEDURE — 99203 OFFICE O/P NEW LOW 30 MIN: CPT | Performed by: OTOLARYNGOLOGY

## 2021-10-18 PROCEDURE — G8427 DOCREV CUR MEDS BY ELIG CLIN: HCPCS | Performed by: OTOLARYNGOLOGY

## 2021-10-18 ASSESSMENT — ENCOUNTER SYMPTOMS
APNEA: 0
SINUS PRESSURE: 0
COLOR CHANGE: 0
BACK PAIN: 0
CHEST TIGHTNESS: 0
TROUBLE SWALLOWING: 0
SHORTNESS OF BREATH: 0
BLOOD IN STOOL: 0
VOICE CHANGE: 1
WHEEZING: 0
CONSTIPATION: 0
SORE THROAT: 0
EYE DISCHARGE: 0
VOMITING: 0
FACIAL SWELLING: 0
DIARRHEA: 0
COUGH: 1
STRIDOR: 0
NAUSEA: 0
EYE PAIN: 0
CHOKING: 0
SINUS PAIN: 0
RHINORRHEA: 0

## 2021-10-18 NOTE — PROGRESS NOTES
Subjective:      Patient ID: Catherine Ibrahim is a 79 y.o. female. HPI  Voice Disorders  CC: am cough and hoarseness  Albaro Body is a(n) 79 y.o. female who presents with a few months history of am cough and then some worsening hoarse voice with talking. Has history of GERD. Taking omeprazole. Denies allergies. Snores at night with some congestion.  Smoker for 5 years in 76s  Daily:Yes  Worse in in the morning  Dysphagia:No  Odynophagia:No  Cough:Yes  Dyspnea: No  Recent cold or flu:No  Reflux disease: Yes  History of throat or chest surgery:No  The voice is gradually worsening  A lack of voice effects my daily life some  Previous episodes:No  Prior voice therapy:No      Patient Active Problem List   Diagnosis    Hypertension    Peptic ulcer    Hyperlipidemia    Vertigo    Ganglion of left wrist    Neck arthritis C5C6-C7    Partial tear of right rotator cuff    Chronic left shoulder pain    Osteopenia    Preventative health care    Arthritis    Degenerative disc disease, lumbar    Hoarseness of voice    Hair loss     Past Surgical History:   Procedure Laterality Date    BREAST SURGERY      Biopsy    CHOLECYSTECTOMY  07/03/2012    Dr. Andi Romero  2003    normal - dr Vimal James N/A 10/9/2018    Sonia Morel performed by Tata Pham MD at [address] [place] [address] SKIN BIOPSY      TONSILLECTOMY      UPPER GASTROINTESTINAL ENDOSCOPY  10/09/2018    [facility]    UPPER GASTROINTESTINAL ENDOSCOPY N/A 10/9/2018    EGD DIAGNOSTIC ONLY performed by Tata Pham MD at [facility] ENDOSCOPY     Family History   Problem Relation Age of Onset    Stroke Mother     Heart Disease Father     Breast Cancer Maternal Aunt     Breast Cancer Paternal Aunt      Social History     Socioeconomic History    Marital status:      Spouse name: Not on file    Number of children: Not on file    Years of education: Not on file    Highest education level: Not on file   Occupational History    Not on file   Tobacco Use    Smoking status: Former Smoker     Packs/day: 0.50     Years: 5.00     Pack years: 2.50     Quit date: 1965     Years since quittin.0    Smokeless tobacco: Never Used   Substance and Sexual Activity    Alcohol use: Yes     Comment: Socially    Drug use: No    Sexual activity: Not on file   Other Topics Concern    Not on file   Social History Narrative    Not on file     Social Determinants of Health     Financial Resource Strain: Low Risk     Difficulty of Paying Living Expenses: Not hard at all   Food Insecurity: No Food Insecurity    Worried About [address] in the Last Year: Never true    [address] in the Last Year: Never true   Transportation Needs:     Lack of Transportation (Medical):  Lack of Transportation (Non-Medical):    Physical Activity:     Days of Exercise per Week:     Minutes of Exercise per Session:    Stress:     Feeling of Stress :    Social Connections:     Frequency of Communication with Friends and Family:     Frequency of Social Gatherings with Friends and Family:     Attends Baptism Services:     Active Member of Clubs or Organizations:     Attends Club or Organization Meetings:     Marital Status:    Intimate Partner Violence:     Fear of Current or Ex-Partner:     Emotionally Abused:     Physically Abused:     Sexually Abused:        DRUG/FOOD ALLERGIES: Nsaids    CURRENT MEDICATIONS  Prior to Admission medications    Medication Sig Start Date End Date Taking?  Authorizing Provider   meloxicam (MOBIC) 15 MG tablet TAKE 1 TABLET BY MOUTH EVERY DAY 10/15/21  Yes Starla Cabrera MD   spironolactone (ALDACTONE) 25 MG tablet Take 1 tablet by mouth daily 10/13/21  Yes Starla Cabrera MD   losartan-hydroCHLOROthiazide [facility]) 100-12.5 MG per tablet TAKE 1 TABLET BY MOUTH EVERY DAY 21  Yes Starla Cabrera MD   amLODIPine shortness of breath, wheezing and stridor. Cardiovascular: Negative for palpitations. Gastrointestinal: Negative for blood in stool, constipation, diarrhea, nausea and vomiting. Endocrine: Negative for cold intolerance, heat intolerance, polydipsia, polyphagia and polyuria. Musculoskeletal: Negative for back pain, gait problem, neck pain and neck stiffness. Skin: Negative for color change, pallor, rash and wound. Allergic/Immunologic: Negative for environmental allergies, food allergies and immunocompromised state. Neurological: Negative for dizziness, facial asymmetry, speech difficulty, light-headedness, numbness and headaches. Hematological: Negative for adenopathy. Does not bruise/bleed easily. Psychiatric/Behavioral: Negative for agitation, confusion, self-injury and sleep disturbance. The patient is not nervous/anxious. Objective:   Physical Exam  Constitutional:       Appearance: She is well-developed. She is not ill-appearing. HENT:      Head: Normocephalic and atraumatic. Not macrocephalic and not microcephalic. No raccoon eyes, Feliciano's sign, abrasion, contusion, right periorbital erythema, left periorbital erythema or laceration. Hair is normal.      Jaw: No trismus. Salivary Glands: Right salivary gland is not diffusely enlarged. Left salivary gland is not diffusely enlarged. Right Ear: Hearing, tympanic membrane and external ear normal. No decreased hearing noted. No drainage, swelling or tenderness. No middle ear effusion. No mastoid tenderness. Tympanic membrane is not perforated, retracted or bulging. Tympanic membrane has normal mobility. Left Ear: Hearing, tympanic membrane and external ear normal. No decreased hearing noted. No drainage, swelling or tenderness. No middle ear effusion. No mastoid tenderness. Tympanic membrane is not perforated, retracted or bulging. Tympanic membrane has normal mobility.       Ears:      Cisse exam findings: does not lateralize. Right Rinne: AC > BC. Left Rinne: AC > BC. Nose: Septal deviation present. No nasal deformity, laceration, mucosal edema or rhinorrhea. Right Nostril: No epistaxis. Left Nostril: No epistaxis. Right Turbinates: Enlarged. Left Turbinates: Enlarged. Right Sinus: No maxillary sinus tenderness or frontal sinus tenderness. Left Sinus: No maxillary sinus tenderness or frontal sinus tenderness. Mouth/Throat:      Lips: No lesions. Mouth: Mucous membranes are not pale, not dry and not cyanotic. No lacerations or oral lesions. Dentition: Normal dentition. No dental caries or dental abscesses. Tongue: No lesions. Palate: No mass. Pharynx: Uvula midline. No oropharyngeal exudate, posterior oropharyngeal erythema or uvula swelling. Tonsils: No tonsillar abscesses. Eyes:      General: Lids are normal. Lids are everted, no foreign bodies appreciated. Right eye: No discharge. Left eye: No discharge. Extraocular Movements:      Right eye: Normal extraocular motion and no nystagmus. Left eye: Normal extraocular motion and no nystagmus. Conjunctiva/sclera:      Right eye: No chemosis or exudate. Left eye: No chemosis or exudate. Neck:      Thyroid: No thyroid mass or thyromegaly. Vascular: Normal carotid pulses. Trachea: Trachea normal. No tracheal deviation. Cardiovascular:      Rate and Rhythm: Normal rate and regular rhythm. Pulmonary:      Effort: No tachypnea, bradypnea or respiratory distress. Breath sounds: No stridor. Musculoskeletal:      Right shoulder: Normal range of motion. Left shoulder: Normal range of motion. Cervical back: Neck supple. Lymphadenopathy:      Head:      Right side of head: No submental, submandibular, tonsillar, preauricular, posterior auricular or occipital adenopathy.       Left side of head: No submental, submandibular, tonsillar, preauricular, posterior auricular or occipital adenopathy. Cervical:      Right cervical: No superficial, deep or posterior cervical adenopathy. Left cervical: No superficial, deep or posterior cervical adenopathy. Skin:     Findings: No bruising, erythema, laceration or lesion. Neurological:      Mental Status: She is alert and oriented to person, place, and time. Psychiatric:         Speech: Speech normal.         Behavior: Behavior normal.         Due to the patients chronic benign and/or malignant laryngeal disease and/or history of laryngeal neoplasm for surveillance a flexible laryngoscopy will be performed to complete a significant physical examination of the patient which cannot be performed by indirect mirror (ie. Gag or incomplete visualization, see physical exam notes). Failure to provide this procedure may lead to late detection of significant chronic benign disease, acute exacerbation, resolution or failure of early diagnosis of recurrent cancer. The procedure report is present in the body of the chart. Flexible Laryngoscopy    Pre op: hoarse voice. Post op: Mild vocal bowing  Procedure : FlexibleLaryngoscopy  Surgeon: Armin Trevino  Anesthesia: Afrin with 2% lidocaine  Estimated Blood Loss: None    Procedure:   Flexible Laryngoscopy     After obtaining consent, the patient was placed in the examination chair in the upright position.   Decongestant and topical anesthetic was sprayed in the After allowing adequate time for hemostatic effect, the flexible 4 mm laryngoscope was passed via the     Nasal Septum: normal;   Nasal Findings: normal;   Nasopharynx: normal   Eustachian Tube: normal   Oropharynx: normal   Base of Tongue: normal   Epiglottis: normal   True Vocal Cord normal   False Vocal Cord: normal   True Vocal Cord Movement: normal   Hypopharynx Mucosa: normal  Arytenoids: normal     * Patient tolerated the procedure well with no complications   * Patient was instructed not to eat for 30 minutes following procedure. * Patient was instructed that they may notice minor bleeding. Attestation:   I was present for the entire viewing, including introduction and removal of the scope. Assessment:       Diagnosis Orders   1. Chronic rhinitis     2. Hoarse voice quality     3. Presbylarynges             Plan:      Medical Decision Making: The following items were considered in medical decision making:  Independent review of images none  Review / order clinical lab tests Reviewed CBC and labs from 10-7 and 10-13  Review / order radiology tests none  Decision to obtain old records reviewed Dr. SONG [facility] notes from 10-13-21  Discussion of pathophysiology with patient and/or family none  Decision to use of endoscopy of paranasal sinuses or larynx for diagnosis or chronic disease management. Hoarseness with gag  Review and summation of old records as accessed through [facility] (a summary of my findings in these old records: NA)    Total time spent with the patient reviewing charts, lab tests, images, outside medical visits, history, examination, answering  questions and formulating treatment plan was 32 minutes.   New 2 - 15 Return 2 - 10  New 3 - 30 Return 3 - 20  New 4 - 45 Return 4 - 30  New 5 - 60 Return 5 - 40+             Deborah Patel MD

## 2021-10-18 NOTE — LETTER
[address] [place] [address]  Phone: [phone]  Fax: [phone] Savanna Rose MD    October 18, 2021     Malick Gatica MD  [address] [place] [address]    Patient: Gregory Santillan   MR Number: [id number]   YOB: 1950   Date of Visit: 10/18/2021       Dear Malick Gatica: Thank you for referring Alex Yanez to me for evaluation/treatment. Below are the relevant portions of my assessment and plan of care. Diagnosis Orders   1. Chronic rhinitis     2. Hoarse voice quality     3. Presbylarynges           Medical Decision Making: The following items were considered in medical decision making:  Independent review of images none  Review / order clinical lab tests Reviewed CBC and labs from 10-7 and 10-13  Review / order radiology tests none  Decision to obtain old records reviewed Dr. Jennifer Du notes from 10-13-21  Discussion of pathophysiology with patient and/or family none  Decision to use of endoscopy of paranasal sinuses or larynx for diagnosis or chronic disease management. Hoarseness with gag  Review and summation of old records as accessed through [facility] (a summary of my findings in these old records: NA)    Total time spent with the patient reviewing charts, lab tests, images, outside medical visits, history, examination, answering  questions and formulating treatment plan was 32 minutes. New 2 - 15 Return 2 - 10  New 3 - 30 Return 3 - 20  New 4 - 45 Return 4 - 30  New 5 - 60 Return 5 - 40+         If you have questions, please do not hesitate to call me. I look forward to following Hailey Angel along with you.     Sincerely,      Deborah Patel MD

## 2021-10-27 ENCOUNTER — OFFICE VISIT (OUTPATIENT)
Dept: DERMATOLOGY | Age: 71
End: 2021-10-27
Payer: MEDICARE

## 2021-10-27 DIAGNOSIS — L23.7 ALLERGIC CONTACT DERMATITIS DUE TO PLANT: Primary | ICD-10-CM

## 2021-10-27 PROCEDURE — 99213 OFFICE O/P EST LOW 20 MIN: CPT | Performed by: DERMATOLOGY

## 2021-10-27 PROCEDURE — G8399 PT W/DXA RESULTS DOCUMENT: HCPCS | Performed by: DERMATOLOGY

## 2021-10-27 PROCEDURE — 3017F COLORECTAL CA SCREEN DOC REV: CPT | Performed by: DERMATOLOGY

## 2021-10-27 PROCEDURE — G8484 FLU IMMUNIZE NO ADMIN: HCPCS | Performed by: DERMATOLOGY

## 2021-10-27 PROCEDURE — 1123F ACP DISCUSS/DSCN MKR DOCD: CPT | Performed by: DERMATOLOGY

## 2021-10-27 PROCEDURE — G8417 CALC BMI ABV UP PARAM F/U: HCPCS | Performed by: DERMATOLOGY

## 2021-10-27 PROCEDURE — 1090F PRES/ABSN URINE INCON ASSESS: CPT | Performed by: DERMATOLOGY

## 2021-10-27 PROCEDURE — 4040F PNEUMOC VAC/ADMIN/RCVD: CPT | Performed by: DERMATOLOGY

## 2021-10-27 PROCEDURE — G8427 DOCREV CUR MEDS BY ELIG CLIN: HCPCS | Performed by: DERMATOLOGY

## 2021-10-27 PROCEDURE — 1036F TOBACCO NON-USER: CPT | Performed by: DERMATOLOGY

## 2021-10-27 RX ORDER — PREDNISONE 10 MG/1
TABLET ORAL
Qty: 40 TABLET | Refills: 0 | Status: SHIPPED | OUTPATIENT
Start: 2021-10-27 | End: 2022-01-13

## 2021-10-27 RX ORDER — CLOBETASOL PROPIONATE 0.5 MG/G
OINTMENT TOPICAL
Qty: 30 G | Refills: 0 | Status: SHIPPED | OUTPATIENT
Start: 2021-10-27 | Stop reason: SDUPTHER

## 2021-10-27 RX ORDER — TRIAMCINOLONE ACETONIDE 0.25 MG/G
CREAM TOPICAL
Qty: 30 G | Refills: 0 | Status: SHIPPED | OUTPATIENT
Start: 2021-10-27 | End: 2022-08-04

## 2021-10-27 NOTE — PROGRESS NOTES
[facility]) Dermatology  Cielo Rose M.D.  [address] [facility]  1950    79 y.o. female     Date of Visit: 10/27/2021    Chief Complaint:   Chief Complaint   Patient presents with    Rash        I was asked to see this patient by Dr. Wu ref. provider found. History of Present Illness:  1. New patient- is a patient of Juan Berman    Patient was exposed to poison ivy when working in the yard about a week ago. 2 to 3 days after exposure began to have multiple erythematous papules over her left arm, then right arm, then abdomen, cheeks, behind her ears. Multiple vesicles left arm. Very pruritic. Has tried calamine lotion and recently purchased [employer].    Patient does have a history of hypertension. No history of diabetes. Has had Covid vaccine    Review of Systems:  Constitutional: Reports general sense of well-being       Past Medical History, Surgical History, Family History, Medications and Allergies reviewed.     Social History:   Social History     Socioeconomic History    Marital status:      Spouse name: Not on file    Number of children: Not on file    Years of education: Not on file    Highest education level: Not on file   Occupational History    Not on file   Tobacco Use    Smoking status: Former Smoker     Packs/day: 0.50     Years: 5.00     Pack years: 2.50     Quit date: 1965     Years since quittin.1    Smokeless tobacco: Never Used   Vaping Use    Vaping Use: Never used   Substance and Sexual Activity    Alcohol use: Yes     Comment: Socially    Drug use: No    Sexual activity: Not on file   Other Topics Concern    Not on file   Social History Narrative    Not on file     Social Determinants of Health     Financial Resource Strain: Low Risk     Difficulty of Paying Living Expenses: Not hard at all   Food Insecurity: No Food Insecurity    Worried About [address] in the Last Year: Never true    [address] in the Last Year: Never true   Transportation Needs:     Lack of Transportation (Medical):  Lack of Transportation (Non-Medical):    Physical Activity:     Days of Exercise per Week:     Minutes of Exercise per Session:    Stress:     Feeling of Stress :    Social Connections:     Frequency of Communication with Friends and Family:     Frequency of Social Gatherings with Friends and Family:     Attends Taoism Services:     Active Member of Clubs or Organizations:     Attends Club or Organization Meetings:     Marital Status:    Intimate Partner Violence:     Fear of Current or Ex-Partner:     Emotionally Abused:     Physically Abused:     Sexually Abused:        Physical Examination       -General: Well-appearing, NAD  1. Erythematous plaques bilateral forearms, behind her ears, right cheek, left cheek, left abdomen  Multiple linear erythematous papules and more confluent plaques  No sign of infection  2 vesicles bandaged left arm      Assessment and Plan     1. Allergic contact dermatitis due to plant-due to widespread involvement, start prednisone 40 mg, 30 mg, 20 mg, 10 mg 4 days of each. Reviewed side effects and contraindications. Patient will follow her blood pressure carefully. Discussed increased risk of infection-potentially with Covid. Discussed avoiding gatherings, wearing masks, washing her hands regularly. Start clobetasol ointment twice daily up to 2 weeks torso, arms, scalp, ears avoiding face. Start triamcinolone 0.025% cream twice daily up to 1 week for her face. Reviewed side effects and contraindications to treatment.

## 2021-10-28 ENCOUNTER — TELEPHONE (OUTPATIENT)
Dept: DERMATOLOGY | Age: 71
End: 2021-10-28

## 2021-10-30 RX ORDER — CLOBETASOL PROPIONATE 0.5 MG/G
OINTMENT TOPICAL
Qty: 60 G | Refills: 0 | Status: SHIPPED | OUTPATIENT
Start: 2021-10-30 | End: 2022-08-04

## 2021-11-01 ENCOUNTER — HOSPITAL ENCOUNTER (OUTPATIENT)
Dept: PHYSICAL THERAPY | Age: 71
Setting detail: THERAPIES SERIES
Discharge: HOME | End: 2021-11-01
Payer: MEDICARE

## 2021-11-01 PROCEDURE — 97530 THERAPEUTIC ACTIVITIES: CPT

## 2021-11-01 PROCEDURE — 97161 PT EVAL LOW COMPLEX 20 MIN: CPT

## 2021-11-01 PROCEDURE — 97110 THERAPEUTIC EXERCISES: CPT

## 2021-11-01 NOTE — FLOWSHEET NOTE
[facility]  Phone: [phone]   Fax: [phone]    Physical Therapy Treatment Note/ Progress Report:     Date:  2021    Patient Name:  Christiana Boeck    :  1950  MRN: [id number]  Restrictions/Precautions:    Medical/Treatment Diagnosis Information:  · Diagnosis: M54.9 (ICD-10-CM) - Back pain, unspecified back location, unspecified back pain laterality, unspecified chronicity  · Treatment Diagnosis: impaired lumbosacral alignment, lumbar hypomobility, soft tissue decreased flexibility, decreased core/LE strength   Insurance/Certification information:  PT Insurance Information: [place] DN not covered  Physician Information:  Referring Practitioner: Nino Arriaga MD  Plan of care signed (Y/N): []  Yes [x]  No    Date of Patient follow up with Physician: PRN     Progress Report: []  Yes  [x]  No     Date Range for reporting period:  Beginnin21  Ending:     Progress report due (10 Rx/or 30 days whichever is less): visit #10 or 10/2/29 (date)     Recertification due (POC duration/ or 90 days whichever is less): visit #16 or 21 (8 weeks)     Visit # Insurance Allowable Auth required?  Date Range   1 [place] []  Yes  [x]  No PCY     Latex Allergy:  [x]NO      []YES  Preferred Language for Healthcare:   [x]English       []other:    Functional Scale:       Date assessed:  Oswestry: raw score = 8; dysfunction = 16%  21    Pain level:  3/10     SUBJECTIVE:  See eval    OBJECTIVE: See eval   Observation:    Test measurements:      RESTRICTIONS/PRECAUTIONS: n/a     Treatment based classification: hypomobility     Comparable sign: pain with end ranges     Exercises/Interventions:     Therapeutic Exercise (74896) Resistance / level Sets / Seconds Reps Notes / Cues          LTR  10\" 3    ITB stretch w/ SOS  20\" 3    SKTC Stretch  30\" 2    Seated lumbar flex / side bend    demo'd                                Therapeutic Activities (63114)       Patient Education  13'  See eval assessment                                Neuromuscular Re-ed (19166)       Supine TAs       Seated on SB w/ pelvic mobility                            Manual Intervention (90891)       Prone PA       GISTM/STM       Lumbar Manip       SI Manip       Hip belt mobs       Hip LA distraction                              Modalities:     Pt. Education:  -pt educated on diagnosis, prognosis and expectations for rehab  -all pt questions were answered    Home Exercise Prorgam:  Access Code: [email or web address]  URL: [email or web address]  Date: 11/01/2021  Prepared by: Ross Genao     Exercises  Seated Flexion Stretch with Swiss Ball - 1 x daily - 7 x weekly - 3 sets - 10 reps  Seated Thoracic Flexion and Rotation with Swiss Ball - 1 x daily - 7 x weekly - 3 sets - 10 reps  Supine ITB Stretch with Strap - 1 x daily - 7 x weekly - 3 sets - 10 reps  Hooklying Single Knee to Chest Stretch - 1 x daily - 7 x weekly - 3 sets - 10 reps  Supine Lower Trunk Rotation - 1 x daily - 7 x weekly - 3 sets - 10 reps    Therapeutic Exercise and NMR EXR  [x] (44985) Provided verbal/tactile cueing for activities related to strengthening, flexibility, endurance, ROM  for improvements in proximal hip and core control with self care, mobility, lifting and ambulation.  [] (24655) Provided verbal/tactile cueing for activities related to improving balance, coordination, kinesthetic sense, posture, motor skill, proprioception  to assist with core control in self care, mobility, lifting, and ambulation.   [] (94097) Therapist is in constant attendance of 2 or more patients providing skilled therapy interventions, but not providing any significant amount of measurable one-on-one time to either patient, for improvements in LE, proximal hip, and core control in self care, mobility, lifting, ambulation and eccentric single leg control.      Therapeutic Activities:    [x] (89702 or 68780) Provided verbal/tactile cueing for activities related to improving balance, coordination, kinesthetic sense, posture, motor skill, proprioception and motor activation to allow for proper function  with self care and ADLs  [] (15855) Provided training and instruction to the patient for proper core and proximal hip recruitment and positioning with ambulation re-education     Home Exercise Program:    [x] (26587) Reviewed/Progressed HEP activities related to strengthening, flexibility, endurance, ROM of core, proximal hip and LE for functional self-care, mobility, lifting and ambulation   [] (38209) Reviewed/Progressed HEP activities related to improving balance, coordination, kinesthetic sense, posture, motor skill, proprioception of core, proximal hip and LE for self care, mobility, lifting, and ambulation      Manual Treatments:  PROM / STM / Oscillations-Mobs:  G-I, II, III, IV (PA's, Inf., Post.)  [] (65990) Provided manual therapy to mobilize proximal hip and LS spine soft tissue/joints for the purpose of modulating pain, promoting relaxation,  increasing ROM, reducing/eliminating soft tissue swelling/inflammation/restriction, improving soft tissue extensibility and allowing for proper ROM for normal function with self care, mobility, lifting and ambulation. Charges:  Timed Code Treatment Minutes: 28   Total Treatment Minutes: 48       [x] EVAL - LOW (82963)   [] EVAL - MOD (80650)  [] EVAL - HIGH (41931)  [] RE-EVAL (07785)  [x] UK(73139) x 1       [] Ionto  [] NMR (26286) x       [] Vaso  [] Manual (17598) x       [] Ultrasound  [x] TA x 1        [] Mech Traction (78395)  [] Aquatic Therapy x     [] ES (un) (33442):   [] Home Management Training x  [] ES(attended) (81196)   [] Dry Needling 1-2 muscles (64773):  [] Dry Needling 3+ muscles (138896  [] Group:      [] Other:     Goals:   Patient stated goal: reduce pain   []? Progressing: []? Met: []? Not Met: []?  Adjusted     Therapist goals for Patient:   Short Term Goals: To be achieved in: 2 weeks  1. Independent in HEP and progression per patient tolerance, in order to prevent re-injury. []? Progressing: []? Met: []? Not Met: []? Adjusted  2. Patient will have a decrease in pain to facilitate improvement in movement, function, and ADLs as indicated by Functional Deficits. []? Progressing: []? Met: []? Not Met: []? Adjusted     Long Term Goals: To be achieved in: 8 weeks  1. Disability index score of 10% or less for the GONZALO to assist with reaching prior level of function. []? Progressing: []? Met: []? Not Met: []? Adjusted  2. Patient will demonstrate increased AROM to WNL, good LS mobility, good hip ROM to allow for proper joint functioning as indicated by patients Functional Deficits. []? Progressing: []? Met: []? Not Met: []? Adjusted  3. Patient will demonstrate an increase in B LE Strength  > 4/5 with good proximal hip and core activation to allow for proper functional mobility as indicated by patients Functional Deficits. []? Progressing: []? Met: []? Not Met: []? Adjusted  4. Patient will return to functional activities including walking > 1 mile or sitting > 1 hour without increased symptoms or restriction. []? Progressing: []? Met: []? Not Met: []? Adjusted  5. Patient will be able to perform household activities such as cooking a meal without increased symptoms or restriction. []? Progressing: []? Met: []? Not Met: []? Adjusted      Overall Progression Towards Functional goals/ Treatment Progress Update:  [] Patient is progressing as expected towards functional goals listed. [] Progression is slowed due to complexities/Impairments listed. [] Progression has been slowed due to co-morbidities.   [x] Plan just implemented, too soon to assess goals progression <30days   [] Goals require adjustment due to lack of progress  [] Patient is not progressing as expected and requires additional follow up with physician  [] Other    Persisting Functional Limitations/Impairments:  [x]Sitting [x]Standing   [x]Walking []Squatting/bending    []Stairs []ADL's    []Transfers []Reaching  [x]Housework []Job related tasks  []Driving []Sports/Recreation   []Sleeping []Other:    ASSESSMENT:  See eval     Treatment/Activity Tolerance:  [x] Patient able to complete tx  [] Patient limited by fatique  [] Patient limited by pain  [] Patient limited by other medical complications  [] Other:     Prognosis: [x] Good [x] Fair  [] Poor    Patient Requires Follow-up: [x] Yes  [] No    PLAN: See davey. PT 1-2x / week for 6-8 weeks. [] Continue per plan of care [] Alter current plan (see comments)  [x] Plan of care initiated [] Hold pending MD visit [] Discharge    Electronically signed by: Denise Barbour PT, DPT      Note: If patient does not return for scheduled/ recommended follow up visits, this note will serve as a discharge from care along with most recent update on progress.

## 2021-11-01 NOTE — PLAN OF CARE
Pedro Pablo, [facility], [address]  Phone: [phone]   Fax: [phone]     Physical Therapy Certification    Dear Referring Practitioner: Nimo Monet MD,    We had the pleasure of evaluating the following patient for physical therapy services at [address] [place] [address]. A summary of our findings can be found in the initial assessment below. This includes our plan of care. If you have any questions or concerns regarding these findings, please do not hesitate to contact me at the office phone number checked above. Thank you for the referral.       Physician Signature:_______________________________Date:__________________  By signing above (or electronic signature), therapists plan is approved by physician      Patient: Abner Urena   : 1950   MRN: [id number]  Referring Physician: Referring Practitioner: Nimo Monet MD      Evaluation Date: 2021      Medical Diagnosis Information:  Diagnosis: M54.9 (ICD-10-CM) - Back pain, unspecified back location, unspecified back pain laterality, unspecified chronicity   Treatment Diagnosis: impaired lumbosacral alignment, lumbar hypomobility, soft tissue decreased flexibility, decreased core/LE strength                                            Insurance information: PT Insurance Information: [place] DN not covered     Precautions/ Contra-indications: n/a   Latex Allergy:  [x]NO      []YES  Preferred Language for Healthcare:   [x]English       []Other:    C-SSRS Triggered by Intake questionnaire (Past 2 wk assessment ):   [x] No, Questionnaire did not trigger screening.   [] Yes, Patient intake triggered C-SSRS Screening     [] Completed, no further action required. [] Completed, PCP notified via Epic    SUBJECTIVE: Patient stated complaint: low back pain on and off for years.  Recently getting worse and x-rays completed with increased disc Bandages/Dressings/Incisions: NA    Dermatomes Normal Abnormal Comments   inguinal area (L1)       anterior mid-thigh (L2)      distal ant thigh/med knee (L3) x     medial lower leg and foot (L4) x     lateral lower leg and foot (L5) x     posterior calf (S1) x     medial calcaneus (S2) x         Reflexes Normal Abnormal Comments   S1-2 Seated achilles      S1-2 Prone knee bend      L3-4 Patellar tendon      Clonus      Babinski          ROM  Comments   Lumbar Flex 75%    Lumbar Ext 50%      ROM LEFT RIGHT Comments   Lumbar Side Bend 50% 25%    Lumbar Rotation 50% 50%    Hip Flexion 100 100    Hip Abd      Hip ER 45 45    Hip IR 15 10    Hip Extension      Knee Ext      Knee Flex      Hamstring Flex 45 from vertical 45 from vertical     Piriformis                      Joint mobility: lumbar spine    []Normal    [x]Hypo   []Hyper      Strength / Myotomes LEFT RIGHT Comments   Multifidus      Transverse Ab      Hip Flexors (L1-2) 4- 4-    Hip Abductors 3+ 3+    Hip Extensors      Hip Internal Rotators      Hip External Rotators      Quads (L2-4) 5- 5-    Hamstrings  5- 5-    Ankle Plantarflexion (S1-2)      Ankle Dorsiflexion (L4-5)      Ankle Inversion      Ankle Eversion (S1-2)      Great Toe Extension (L5)        TA Muscle Contraction Scale    Criteria                                               Score  Quality of Contraction   Not Present      [] 0   Rapid, Superificial     [] 1   Just Perceptible     [] 2     Gentle, Slow      [] 3    Substitution   Resting       [] 0   Moderate to Strong     [] 1    Subtle Perceptible     [] 2   None       [] 3    Symmetry of Contraction   Unilateral       [] 0   Bilateral/Asymmetrical     [] 1   Symmetrical       [] 2    Breathing     Inability/Difficulty Breathing during contraction [] 0   Able to hold contraction while Breathing  [] 1    Holding   Able to Hold Contraction <10 s   [] 0   Able to Hold Contraction >10 s   [] 1      __/10  Adapted from Petty al, (J44.9)  []CHF  []A-fib   Psychological Disorders  []Anxiety (F41.9)  []Depression (F32.9)   []Other:   Developmental Disorders  []Autism (F84.0)  []CP (G80)  []Down Syndrome (Q90.9)  []Developmental delay     Neurological conditions  []Prior Stroke (I69.30)  []Parkinson's (G20)  []Encephalopathy (G93.40)  []MS (G35)  []Post-polio (G14)  []SCI  []TBI  []ALS Other conditions  []Fibromyalgia (M79.7)  []Vertigo  []Syncope  []Kidney Failure  [x]Cancer      []currently undergoing                treatment  []Pregnancy  []Incontinence   Prior surgeries  []involved limb  []previous spinal surgery  [] section birth  []hysterectomy  []bowel / bladder surgery  []other relevant surgeries   []Other:               Barriers to/and or personal factors that will affect rehab potential:              [x]Age  []Sex    []Smoker              [x]Motivation/Lack of Motivation                        [x]Co-Morbidities              []Cognitive Function, education/learning barriers              []Environmental, home barriers              []profession/work barriers  [x]past PT/medical experience  []other:  Justification: fair prognosis with 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 [] Falls Risk assessed and Patient requires intervention due to being higher risk   TUG score (>12s at risk):     [] Falls education provided, including         ASSESSMENT: Ana Mace is a 69yo female presenting with low back and R sided leg pain. Upon assessment, pt with significant hypomobility of lumbar spine and muscle tightness that is significantly tender to palpation. She has decreased lumbar ROM, limited hip IR (B), and poor trunk / hip strength. Pt education provided on x-ray results, effects of disc degeneration and arthritis, lumbar spine typical alignment verse her current alignment, hypomobility, and surrounding muscle strength.  Pt encouraged to initiate therapy to address deficits as tolerated with low impact stretching and strengthening to facilitate mobility of the lumbar spine. Pt to benefit from skilled PT at this time. Functional Impairments:     [x]Noted lumbar/proximal hip hypomobility   []Noted lumbosacral and/or generalized hypermobility   []Decreased Lumbosacral/hip/LE functional ROM   [x]Decreased core/proximal hip strength and neuromuscular control    [x]Decreased LE functional strength    []Abnormal reflexes/sensation/myotomal/dermatomal deficits  []Reduced balance/proprioceptive control    []other:      Functional Activity Limitations (from functional questionnaire and intake)   [x]Reduced ability to tolerate prolonged functional positions   []Reduced ability or difficulty with changes of positions or transfers between positions   []Reduced ability to maintain good posture and demonstrate good body mechanics with sitting, bending, and lifting   [x]Reduced ability to sleep   [] Reduced ability or tolerance with driving and/or computer work   [x]Reduced ability to perform lifting, reaching, carrying tasks   []Reduced ability to squat   []Reduced ability to forward bend   [x]Reduced ability to ambulate prolonged functional periods/distances/surfaces   []Reduced ability to ascend/descend stairs   []other:       Participation Restrictions   []Reduced participation in self care activities   [x]Reduced participation in home management activities   []Reduced participation in work activities   [x]Reduced participation in social activities. []Reduced participation in sport/recreational activities. Classification:   []Signs/symptoms consistent with Lumbar instability/stabilization subgroup. []Signs/symptoms consistent with Lumbar mobilization/manipulation subgroup, myotomes and dermatomes intact. Meets manipulation criteria.     []Signs/symptoms consistent with Lumbar direction specific/centralization subgroup   [x]Signs/symptoms consistent with Lumbar traction subgroup     []Signs/symptoms consistent with lumbar facet dysfunction   [x]Signs/symptoms consistent with lumbar stenosis type dysfunction   []Signs/symptoms consistent with nerve root involvement including myotome & dermatome dysfunction   []Signs/symptoms consistent with post-surgical status including: decreased ROM, strength and function. [x]signs/symptoms consistent with pathology which may benefit from Dry needling     []other:      Prognosis/Rehab Potential:      []Excellent   [x]Good    [x]Fair   []Poor    Tolerance of evaluation/treatment:    []Excellent   [x]Good    []Fair   []Poor     Physical Therapy Evaluation Complexity Justification  [x] A history of present problem with:  [] no personal factors and/or comorbidities that impact the plan of care;  [x]1-2 personal factors and/or comorbidities that impact the plan of care  []3 personal factors and/or comorbidities that impact the plan of care  [x] An examination of body systems using standardized tests and measures addressing any of the following: body structures and functions (impairments), activity limitations, and/or participation restrictions;:  [] a total of 1-2 or more elements   [x] a total of 3 or more elements   [] a total of 4 or more elements   [x] A clinical presentation with:  [x] stable and/or uncomplicated characteristics   [] evolving clinical presentation with changing characteristics  [] unstable and unpredictable characteristics;   [x] Clinical decision making of [x] low, [] moderate, [] high complexity using standardized patient assessment instrument and/or measurable assessment of functional outcome.     [x] EVAL (LOW) 62398 (typically 15 minutes face-to-face)  [] EVAL (MOD) 93769 (typically 30 minutes face-to-face)  [] EVAL (HIGH) 48282 (typically 45 minutes face-to-face)  [] RE-EVAL     PLAN: Begin PT focusing on: proximal hip mobilizations, LB mobs, LB core activation, proximal hip activation, and HEP    Access Code: [email or web address]  URL: [email or web address]  Date: 11/01/2021  Prepared by: Rodrick Mata    Exercises  Seated Flexion Stretch with Swiss Ball - 1 x daily - 7 x weekly - 3 sets - 10 reps  Seated Thoracic Flexion and Rotation with Swiss Ball - 1 x daily - 7 x weekly - 3 sets - 10 reps  Supine ITB Stretch with Strap - 1 x daily - 7 x weekly - 3 sets - 10 reps  Hooklying Single Knee to Chest Stretch - 1 x daily - 7 x weekly - 3 sets - 10 reps  Supine Lower Trunk Rotation - 1 x daily - 7 x weekly - 3 sets - 10 reps      Frequency/Duration:  1-2 days per week for 6-8 Weeks:  Interventions:  [x]  Therapeutic exercise including: strength training, ROM, for LE, Glutes and core   [x]  NMR activation and proprioception for glutes , LE and Core   [x]  Manual therapy as indicated for Hip complex, LE and spine to include: Dry Needling/IASTM, STM, PROM, Gr I-IV mobilizations, manipulation. [x]  Modalities as needed that may include: thermal agents, E-stim, Biofeedback, US, iontophoresis as indicated  [x]  Patient education on joint protection, postural re-education, activity modification, progression of HEP. HEP instruction: Written HEP instructions provided and reviewed. GOALS:  Patient stated goal: reduce pain   [] Progressing: [] Met: [] Not Met: [] Adjusted    Therapist goals for Patient:   Short Term Goals: To be achieved in: 2 weeks  1. Independent in HEP and progression per patient tolerance, in order to prevent re-injury. [] Progressing: [] Met: [] Not Met: [] Adjusted  2. Patient will have a decrease in pain to facilitate improvement in movement, function, and ADLs as indicated by Functional Deficits. [] Progressing: [] Met: [] Not Met: [] Adjusted    Long Term Goals: To be achieved in: 8 weeks  1. Disability index score of 10% or less for the GONZALO to assist with reaching prior level of function. [] Progressing: [] Met: [] Not Met: [] Adjusted  2.  Patient will demonstrate increased AROM to WNL, good LS mobility, good hip ROM to allow for proper joint functioning as indicated by patients Functional Deficits. [] Progressing: [] Met: [] Not Met: [] Adjusted  3. Patient will demonstrate an increase in B LE Strength  > 4/5 with good proximal hip and core activation to allow for proper functional mobility as indicated by patients Functional Deficits. [] Progressing: [] Met: [] Not Met: [] Adjusted  4. Patient will return to functional activities including walking > 1 mile or sitting > 1 hour without increased symptoms or restriction. [] Progressing: [] Met: [] Not Met: [] Adjusted  5. Patient will be able to perform household activities such as cooking a meal without increased symptoms or restriction.    [] Progressing: [] Met: [] Not Met: [] Adjusted     Electronically signed by:  Carl Tracy, PT, DPT

## 2021-11-03 ENCOUNTER — HOSPITAL ENCOUNTER (OUTPATIENT)
Dept: PHYSICAL THERAPY | Age: 71
Setting detail: THERAPIES SERIES
Discharge: HOME | End: 2021-11-03
Payer: MEDICARE

## 2021-11-03 PROCEDURE — 97110 THERAPEUTIC EXERCISES: CPT

## 2021-11-03 PROCEDURE — 97112 NEUROMUSCULAR REEDUCATION: CPT

## 2021-11-03 PROCEDURE — 97140 MANUAL THERAPY 1/> REGIONS: CPT

## 2021-11-03 NOTE — FLOWSHEET NOTE
[facility]  Phone: [phone]   Fax: [phone]    Physical Therapy Treatment Note/ Progress Report:     Date:  11/3/2021    Patient Name:  Jesus Mike    :  1950  MRN: [id number]  Restrictions/Precautions:    Medical/Treatment Diagnosis Information:  · Diagnosis: M54.9 (ICD-10-CM) - Back pain, unspecified back location, unspecified back pain laterality, unspecified chronicity  · Treatment Diagnosis: impaired lumbosacral alignment, lumbar hypomobility, soft tissue decreased flexibility, decreased core/LE strength   Insurance/Certification information:  PT Insurance Information: [place] DN not covered  Physician Information:  Referring Practitioner: Chey Chacon MD  Plan of care signed (Y/N): [x]  Yes []  No    Date of Patient follow up with Physician: PRN     Progress Report: []  Yes  [x]  No     Date Range for reporting period:  Beginnin21  Ending:     Progress report due (10 Rx/or 30 days whichever is less): visit #10 or 20/3/75 (date)     Recertification due (POC duration/ or 90 days whichever is less): visit #16 or 21 (8 weeks)     Visit # Insurance Allowable Auth required? Date Range   2 [place] []  Yes  [x]  No PCY     Latex Allergy:  [x]NO      []YES  Preferred Language for Healthcare:   [x]English       []other:    Functional Scale:       Date assessed:  Oswestry: raw score = 8; dysfunction = 16%  21    Pain level:  2-3/10     SUBJECTIVE:  Pt is taking prednisone to help with recent poison ivy infection, this has helped with her pain considerably. Pt reports R ITB syndrome. Pain isn't too bad today. Pt also reports history of B plantar fasciitis, states she doesn't continue exercises when she has no pain, therefore it flares up intermittently.     OBJECTIVE: See eval   Observation:    Test measurements:      RESTRICTIONS/PRECAUTIONS: n/a     Treatment based classification: hypomobility     Comparable sign: pain with end ranges     Exercises/Interventions:     Therapeutic Exercise (53006) Resistance / level Sets / Seconds Reps Notes / Cues   Nustep  4'     IB calf stretch  HSS on step  30\"  30\" 2  2 B    LTR     ITB stretch w/ SOS     SKTC Stretch     Seated lumbar flex / side bend              Hip hikes Level ground 2 10 11/3 educated to perform in front of bathroom mirror at home for visual feedback   Lateral band walks Orange 2 laps at ballet bar            Therapeutic Activities (81143)                                   Neuromuscular Re-ed (93613)       Supine TAs       Seated on SB w/ pelvic mobility       Quadruped UE reach  Quadruped LE knee lift to level pelvis with opp knee on airex   1  3\" 10 B  10 B                  Manual Intervention (36239)       Prone PA       GISTM/STM to R lateral quad and HS With therastick 12'     Lumbar Manip       SI Manip       Hip belt mobs       Hip LA distraction                              Modalities:     Pt. Education:  -pt educated on diagnosis, prognosis and expectations for rehab  -all pt questions were answered    Home Exercise Prorgam:  Access Code: [email or web address]  URL: [email or web address]  Date: 11/01/2021  Prepared by: Yolanda Meehan     Exercises  Seated Flexion Stretch with Swiss Ball - 1 x daily - 7 x weekly - 3 sets - 10 reps  Seated Thoracic Flexion and Rotation with Swiss Ball - 1 x daily - 7 x weekly - 3 sets - 10 reps  Supine ITB Stretch with Strap - 1 x daily - 7 x weekly - 3 sets - 10 reps  Hooklying Single Knee to Chest Stretch - 1 x daily - 7 x weekly - 3 sets - 10 reps  Supine Lower Trunk Rotation - 1 x daily - 7 x weekly - 3 sets - 10 reps    Therapeutic Exercise and NMR EXR  [x] (14964) Provided verbal/tactile cueing for activities related to strengthening, flexibility, endurance, ROM  for improvements in proximal hip and core control with self care, mobility, lifting and ambulation.  [] (18132) Provided verbal/tactile cueing for activities related to improving balance, coordination, kinesthetic sense, posture, motor skill, proprioception  to assist with core control in self care, mobility, lifting, and ambulation.   [] (36298) Therapist is in constant attendance of 2 or more patients providing skilled therapy interventions, but not providing any significant amount of measurable one-on-one time to either patient, for improvements in LE, proximal hip, and core control in self care, mobility, lifting, ambulation and eccentric single leg control. Therapeutic Activities:    [x] (17413 or 35534) Provided verbal/tactile cueing for activities related to improving balance, coordination, kinesthetic sense, posture, motor skill, proprioception and motor activation to allow for proper function  with self care and ADLs  [] (18903) Provided training and instruction to the patient for proper core and proximal hip recruitment and positioning with ambulation re-education     Home Exercise Program:    [x] (27687) Reviewed/Progressed HEP activities related to strengthening, flexibility, endurance, ROM of core, proximal hip and LE for functional self-care, mobility, lifting and ambulation   [] (24297) Reviewed/Progressed HEP activities related to improving balance, coordination, kinesthetic sense, posture, motor skill, proprioception of core, proximal hip and LE for self care, mobility, lifting, and ambulation      Manual Treatments:  PROM / STM / Oscillations-Mobs:  G-I, II, III, IV (PA's, Inf., Post.)  [] (17829) Provided manual therapy to mobilize proximal hip and LS spine soft tissue/joints for the purpose of modulating pain, promoting relaxation,  increasing ROM, reducing/eliminating soft tissue swelling/inflammation/restriction, improving soft tissue extensibility and allowing for proper ROM for normal function with self care, mobility, lifting and ambulation.        Charges:  Timed Code Treatment Minutes: 42   Total Treatment Minutes: 42       [] EVAL - LOW (14657)   [] EVAL - MOD (55124)  [] EVAL - HIGH (19524)  [] RE-EVAL (73684)  [x] WW(80631) x 1       [] Ionto  [x] NMR (71748) x  1     [] Vaso  [x] Manual (44578) x 1      [] Ultrasound  [] TA x         [] Mech Traction (97483)  [] Aquatic Therapy x     [] ES (un) (82191):   [] Home Management Training x  [] ES(attended) (92269)   [] Dry Needling 1-2 muscles (57967):  [] Dry Needling 3+ muscles (161216  [] Group:      [] Other:     Goals:   Patient stated goal: reduce pain   []? Progressing: []? Met: []? Not Met: []? Adjusted     Therapist goals for Patient:   Short Term Goals: To be achieved in: 2 weeks  1. Independent in HEP and progression per patient tolerance, in order to prevent re-injury. []? Progressing: []? Met: []? Not Met: []? Adjusted  2. Patient will have a decrease in pain to facilitate improvement in movement, function, and ADLs as indicated by Functional Deficits. []? Progressing: []? Met: []? Not Met: []? Adjusted     Long Term Goals: To be achieved in: 8 weeks  1. Disability index score of 10% or less for the GONZLAO to assist with reaching prior level of function. []? Progressing: []? Met: []? Not Met: []? Adjusted  2. Patient will demonstrate increased AROM to WNL, good LS mobility, good hip ROM to allow for proper joint functioning as indicated by patients Functional Deficits. []? Progressing: []? Met: []? Not Met: []? Adjusted  3. Patient will demonstrate an increase in B LE Strength  > 4/5 with good proximal hip and core activation to allow for proper functional mobility as indicated by patients Functional Deficits. []? Progressing: []? Met: []? Not Met: []? Adjusted  4. Patient will return to functional activities including walking > 1 mile or sitting > 1 hour without increased symptoms or restriction. []? Progressing: []? Met: []? Not Met: []? Adjusted  5. Patient will be able to perform household activities such as cooking a meal without increased symptoms or restriction.    []? Progressing: []? Met: []? Not Met: []? Adjusted      Overall Progression Towards Functional goals/ Treatment Progress Update:  [] Patient is progressing as expected towards functional goals listed. [] Progression is slowed due to complexities/Impairments listed. [] Progression has been slowed due to co-morbidities. [x] Plan just implemented, too soon to assess goals progression <30days   [] Goals require adjustment due to lack of progress  [] Patient is not progressing as expected and requires additional follow up with physician  [] Other    Persisting Functional Limitations/Impairments:  [x]Sitting [x]Standing   [x]Walking []Squatting/bending    []Stairs []ADL's    []Transfers []Reaching  [x]Housework []Job related tasks  []Driving []Sports/Recreation   []Sleeping []Other:    ASSESSMENT:  Exercises introduced per log. Pt tolerated all well without complaints of pain exacerbation. Instructed pt to perform hip hikes in front of mirror at home for visual feedback for proper technique. Pt TTP at distal ITB, R quad and HS, R glut max near iliac insertion, responded well to [facility] and was able to progress pressure for desensitization. Educated pt on pain science and reduction of sensitivity to improve tolerance to activity. Also educated pt to prop pillow behind back when in R sidelying to roll off greater trochanter to prevent further compression. Pt verbalized understanding of all education provided. Continue to progress core control, provide education on standing posture to reduce pain while cooking in kitchen, and strengthening of B LE's to improve standing and walking tolerance. Treatment/Activity Tolerance:  [x] Patient able to complete tx  [] Patient limited by fatique  [] Patient limited by pain  [] Patient limited by other medical complications  [] Other:     Prognosis: [x] Good [x] Fair  [] Poor    Patient Requires Follow-up: [x] Yes  [] No    PLAN: See eval. PT 1-2x / week for 6-8 weeks.    [x] Continue

## 2021-11-08 ENCOUNTER — HOSPITAL ENCOUNTER (OUTPATIENT)
Dept: PHYSICAL THERAPY | Age: 71
Setting detail: THERAPIES SERIES
Discharge: HOME | End: 2021-11-08
Payer: MEDICARE

## 2021-11-08 PROCEDURE — 97110 THERAPEUTIC EXERCISES: CPT

## 2021-11-08 PROCEDURE — 97112 NEUROMUSCULAR REEDUCATION: CPT

## 2021-11-08 NOTE — FLOWSHEET NOTE
[address]  Phone: [phone]   Fax: [phone]    Physical Therapy Treatment Note/ Progress Report:     Date:  2021    Patient Name:  Khai Nam    :  1950  MRN: [id number]  Restrictions/Precautions:    Medical/Treatment Diagnosis Information:  · Diagnosis: M54.9 (ICD-10-CM) - Back pain, unspecified back location, unspecified back pain laterality, unspecified chronicity  · Treatment Diagnosis: impaired lumbosacral alignment, lumbar hypomobility, soft tissue decreased flexibility, decreased core/LE strength   Insurance/Certification information:  PT Insurance Information: [place] DN not covered  Physician Information:  Referring Practitioner: Alfredo Ruiz MD  Plan of care signed (Y/N): [x]  Yes []  No    Date of Patient follow up with Physician: PRN     Progress Report: []  Yes  [x]  No     Date Range for reporting period:  Beginnin21  Ending:     Progress report due (10 Rx/or 30 days whichever is less): visit #10 or 72 (date)     Recertification due (POC duration/ or 90 days whichever is less): visit #16 or 21 (8 weeks)     Visit # Insurance Allowable Auth required? Date Range   3 [place] []  Yes  [x]  No PCY     Latex Allergy:  [x]NO      []YES  Preferred Language for Healthcare:   [x]English       []other:    Functional Scale:       Date assessed:  Oswestry: raw score = 8; dysfunction = 16%  21    Pain level:  2-3/10     SUBJECTIVE:  Pt reports today is her last day of taking prednisone. Pt is a little more achy in R low back. Pt was able to perform hip hikes and used rolling pin on lateral thigh over the weekend and is a little sore from this.     OBJECTIVE: See eval   Observation:    Test measurements:      RESTRICTIONS/PRECAUTIONS: n/a     Treatment based classification: hypomobility     Comparable sign: pain with end ranges     Exercises/Interventions:     Therapeutic Exercise (28354) reps  Side Stepping with Resistance at Ankles - 1 x daily - 7 x weekly - 3 sets - 10 reps      Therapeutic Exercise and NMR EXR  [x] (38810) Provided verbal/tactile cueing for activities related to strengthening, flexibility, endurance, ROM  for improvements in proximal hip and core control with self care, mobility, lifting and ambulation.  [] (03028) Provided verbal/tactile cueing for activities related to improving balance, coordination, kinesthetic sense, posture, motor skill, proprioception  to assist with core control in self care, mobility, lifting, and ambulation.   [] (71450) Therapist is in constant attendance of 2 or more patients providing skilled therapy interventions, but not providing any significant amount of measurable one-on-one time to either patient, for improvements in LE, proximal hip, and core control in self care, mobility, lifting, ambulation and eccentric single leg control.      Therapeutic Activities:    [x] (09937 or 10178) Provided verbal/tactile cueing for activities related to improving balance, coordination, kinesthetic sense, posture, motor skill, proprioception and motor activation to allow for proper function  with self care and ADLs  [] (75003) Provided training and instruction to the patient for proper core and proximal hip recruitment and positioning with ambulation re-education     Home Exercise Program:    [x] (67835) Reviewed/Progressed HEP activities related to strengthening, flexibility, endurance, ROM of core, proximal hip and LE for functional self-care, mobility, lifting and ambulation   [] (81346) Reviewed/Progressed HEP activities related to improving balance, coordination, kinesthetic sense, posture, motor skill, proprioception of core, proximal hip and LE for self care, mobility, lifting, and ambulation      Manual Treatments:  PROM / STM / Oscillations-Mobs:  G-I, II, III, IV (PA's, Inf., Post.)  [] (50935) Provided manual therapy to mobilize proximal hip and LS Functional Deficits. []? Progressing: []? Met: []? Not Met: []? Adjusted  4. Patient will return to functional activities including walking > 1 mile or sitting > 1 hour without increased symptoms or restriction. []? Progressing: []? Met: []? Not Met: []? Adjusted  5. Patient will be able to perform household activities such as cooking a meal without increased symptoms or restriction. []? Progressing: []? Met: []? Not Met: []? Adjusted      Overall Progression Towards Functional goals/ Treatment Progress Update:  [] Patient is progressing as expected towards functional goals listed. [] Progression is slowed due to complexities/Impairments listed. [] Progression has been slowed due to co-morbidities. [x] Plan just implemented, too soon to assess goals progression <30days   [] Goals require adjustment due to lack of progress  [] Patient is not progressing as expected and requires additional follow up with physician  [] Other    Persisting Functional Limitations/Impairments:  [x]Sitting [x]Standing   [x]Walking []Squatting/bending    []Stairs []ADL's    []Transfers []Reaching  [x]Housework []Job related tasks  []Driving []Sports/Recreation   []Sleeping []Other:    ASSESSMENT:  Continued to focus on core control and hip strengthening this date. Pt tolerated all exercises well. Requires cueing to maintain level pelvis. Pt continues to have increased tenderness to lateral thigh musculature, improving with manual techniques to reduce sensitivity. Updated HEP to progress hip strengthening and core control. Continue to progress core control, provide education on standing posture to reduce pain while cooking in kitchen, and strengthening of B LE's to improve standing and walking tolerance.     Treatment/Activity Tolerance:  [x] Patient able to complete tx  [] Patient limited by fatique  [] Patient limited by pain  [] Patient limited by other medical complications  [] Other:     Prognosis: [x] Good [x] Fair  []

## 2021-11-12 ENCOUNTER — HOSPITAL ENCOUNTER (OUTPATIENT)
Dept: PHYSICAL THERAPY | Age: 71
Setting detail: THERAPIES SERIES
Discharge: HOME | End: 2021-11-12
Payer: MEDICARE

## 2021-11-12 PROBLEM — Z00.00 PREVENTATIVE HEALTH CARE: Status: RESOLVED | Noted: 2018-09-28 | Resolved: 2021-11-12

## 2021-11-12 PROCEDURE — 97112 NEUROMUSCULAR REEDUCATION: CPT

## 2021-11-12 PROCEDURE — 97110 THERAPEUTIC EXERCISES: CPT

## 2021-11-12 NOTE — FLOWSHEET NOTE
[address] [facility]  Phone: [phone]   Fax: [phone]    Physical Therapy Treatment Note/ Progress Report:     Date:  2021    Patient Name:  Nelson Gil    :  1950  MRN: [id number]  Restrictions/Precautions:    Medical/Treatment Diagnosis Information:  · Diagnosis: M54.9 (ICD-10-CM) - Back pain, unspecified back location, unspecified back pain laterality, unspecified chronicity  · Treatment Diagnosis: impaired lumbosacral alignment, lumbar hypomobility, soft tissue decreased flexibility, decreased core/LE strength   Insurance/Certification information:  PT Insurance Information: [place] DN not covered  Physician Information:  Referring Practitioner: Lou Ramsay MD  Plan of care signed (Y/N): [x]  Yes []  No    Date of Patient follow up with Physician: PRN     Progress Report: []  Yes  [x]  No     Date Range for reporting period:  Beginnin21  Ending:     Progress report due (10 Rx/or 30 days whichever is less): visit #10 or  (date)     Recertification due (POC duration/ or 90 days whichever is less): visit #16 or 21 (8 weeks)     Visit # Insurance Allowable Auth required? Date Range   4 [place] []  Yes  [x]  No PCY     Latex Allergy:  [x]NO      []YES  Preferred Language for Healthcare:   [x]English       []other:    Functional Scale:       Date assessed:  Oswestry: raw score = 8; dysfunction = 16%  21    Pain level:  2-3/10     SUBJECTIVE:  Pt states pain is more noticeable now that she is off prednisone. Reports therapy is helping but still having pain. Questions on lumbar injection.      OBJECTIVE: See eval   Observation:    Test measurements:      RESTRICTIONS/PRECAUTIONS: n/a     Treatment based classification: hypomobility     Comparable sign: pain with end ranges     Exercises/Interventions:     Therapeutic Exercise (91697) Resistance / level Sets / Seconds Reps Notes / Cues   Nustep      IB calf stretch  HSS on step  30\"  30\" 2  2 B    LTR     ITB stretch w/ SOS     SKTC Stretch     Seated lumbar flex / side bend       [employer] back lumbar stretch  20\" 3    Hip hikes 4\" 2 10 11/3 educated to perform in front of bathroom mirror at home for visual feedback   Lateral band walks  [employer] 2 laps at ballet bar     Sidelying hip IR (reverse clams)  Sidelying hip ER (clams) Zhane Vane 2  2 10  10 Added 11/8                        Therapeutic Activities (57484)                                   Neuromuscular Re-ed (80052)       Lundy Rear  5\" 15    Seated on SB w/ pelvic mobility A/P, R/L, CW/CC 1 15 each    Quadruped UE reach  Quadruped LE knee lift to level pelvis with opp knee on airex      Multifidi walk outs  Added 11/8   Cat/Camel  3\" hold 15 each Focused on lumbar / pelvic mobility                  Manual Intervention (14049)       Prone PA       GISTM/STM to R lateral quad and HS With therastick 6'     Lumbar Manip       SI Manip       Hip belt mobs       Hip LA distraction                              Modalities:     Pt. Education:  -pt educated on diagnosis, prognosis and expectations for rehab  -all pt questions were answered    Home Exercise Prorgam:   Access Code: [email or web address]  URL: [email or web address]  Date: 11/01/2021  Prepared by: Sue Tyson     Exercises  Seated Flexion Stretch with Swiss Ball - 1 x daily - 7 x weekly - 3 sets - 10 reps  Seated Thoracic Flexion and Rotation with Swiss Ball - 1 x daily - 7 x weekly - 3 sets - 10 reps  Supine ITB Stretch with Strap - 1 x daily - 7 x weekly - 3 sets - 10 reps  Hooklying Single Knee to Chest Stretch - 1 x daily - 7 x weekly - 3 sets - 10 reps  Supine Lower Trunk Rotation - 1 x daily - 7 x weekly - 3 sets - 10 reps  Clamshell with Resistance - 1 x daily - 7 x weekly - 2 sets - 10 reps - 5 hold  Sidelying Reverse Clamshell with Resistance - 1 x daily - 7 x weekly - 2 sets - 10 reps - 5 hold  Hip Hiking on Step - 1 x daily - proximal hip and LS spine soft tissue/joints for the purpose of modulating pain, promoting relaxation,  increasing ROM, reducing/eliminating soft tissue swelling/inflammation/restriction, improving soft tissue extensibility and allowing for proper ROM for normal function with self care, mobility, lifting and ambulation. Charges:  Timed Code Treatment Minutes: 44   Total Treatment Minutes: 44       [] EVAL - LOW (20188)   [] EVAL - MOD (50178)  [] EVAL - HIGH (93495)  [] RE-EVAL (38125)  [x] RP(30970) x 2       [] Ionto  [x] NMR (65757) x  1     [] Vaso  [] Manual (04785) x       [] Ultrasound  [] TA x         [] Mech Traction (43982)  [] Aquatic Therapy x     [] ES (un) (67334):   [] Home Management Training x  [] ES(attended) (76124)   [] Dry Needling 1-2 muscles (08625):  [] Dry Needling 3+ muscles (250538  [] Group:      [] Other:     Goals:   Patient stated goal: reduce pain   []? Progressing: []? Met: []? Not Met: []? Adjusted     Therapist goals for Patient:   Short Term Goals: To be achieved in: 2 weeks  1. Independent in HEP and progression per patient tolerance, in order to prevent re-injury. []? Progressing: []? Met: []? Not Met: []? Adjusted  2. Patient will have a decrease in pain to facilitate improvement in movement, function, and ADLs as indicated by Functional Deficits. []? Progressing: []? Met: []? Not Met: []? Adjusted     Long Term Goals: To be achieved in: 8 weeks  1. Disability index score of 10% or less for the GONZALO to assist with reaching prior level of function. []? Progressing: []? Met: []? Not Met: []? Adjusted  2. Patient will demonstrate increased AROM to WNL, good LS mobility, good hip ROM to allow for proper joint functioning as indicated by patients Functional Deficits. []? Progressing: []? Met: []? Not Met: []? Adjusted  3.  Patient will demonstrate an increase in B LE Strength  > 4/5 with good proximal hip and core activation to allow for proper functional mobility as indicated by patients Functional Deficits. []? Progressing: []? Met: []? Not Met: []? Adjusted  4. Patient will return to functional activities including walking > 1 mile or sitting > 1 hour without increased symptoms or restriction. []? Progressing: []? Met: []? Not Met: []? Adjusted  5. Patient will be able to perform household activities such as cooking a meal without increased symptoms or restriction. []? Progressing: []? Met: []? Not Met: []? Adjusted      Overall Progression Towards Functional goals/ Treatment Progress Update:  [] Patient is progressing as expected towards functional goals listed. [] Progression is slowed due to complexities/Impairments listed. [] Progression has been slowed due to co-morbidities. [x] Plan just implemented, too soon to assess goals progression <30days   [] Goals require adjustment due to lack of progress  [] Patient is not progressing as expected and requires additional follow up with physician  [] Other    Persisting Functional Limitations/Impairments:  [x]Sitting [x]Standing   [x]Walking []Squatting/bending    []Stairs []ADL's    []Transfers []Reaching  [x]Housework []Job related tasks  []Driving []Sports/Recreation   []Sleeping []Other:    ASSESSMENT:  Continued to focus on core control and hip strengthening this date. Pt tolerated all exercises well. Requires cueing for pelvic mobility verse lumbar spine movement. Pt continues to have increased tenderness to lateral thigh musculature, improving with manual techniques to reduce sensitivity. Updated HEP to progress hip strengthening and core control. Continue to progress core control, provide education on standing posture to reduce pain while cooking in kitchen, and strengthening of B LE's to improve standing and walking tolerance.     Treatment/Activity Tolerance:  [x] Patient able to complete tx  [] Patient limited by fatique  [] Patient limited by pain  [] Patient limited by other medical complications  [] Other:     Prognosis: [x] Good [x] Fair  [] Poor    Patient Requires Follow-up: [x] Yes  [] No    PLAN: See eval. PT 1-2x / week for 6-8 weeks. [x] Continue per plan of care [] Alter current plan (see comments)  [] Plan of care initiated [] Hold pending MD visit [] Discharge    Electronically signed by: Nazanin Rao, PT, DPT      Note: If patient does not return for scheduled/ recommended follow up visits, this note will serve as a discharge from care along with most recent update on progress.

## 2021-11-15 ENCOUNTER — HOSPITAL ENCOUNTER (OUTPATIENT)
Dept: PHYSICAL THERAPY | Age: 71
Setting detail: THERAPIES SERIES
Discharge: HOME | End: 2021-11-15
Payer: MEDICARE

## 2021-11-15 PROCEDURE — 97110 THERAPEUTIC EXERCISES: CPT

## 2021-11-15 PROCEDURE — 97112 NEUROMUSCULAR REEDUCATION: CPT

## 2021-11-15 NOTE — FLOWSHEET NOTE
Jose M , [facility]  Phone: [phone]   Fax: [phone]    Physical Therapy Treatment Note/ Progress Report:     Date:  11/15/2021    Patient Name:  Francisca Barrios    :  1950  MRN: [id number]  Restrictions/Precautions:    Medical/Treatment Diagnosis Information:  · Diagnosis: M54.9 (ICD-10-CM) - Back pain, unspecified back location, unspecified back pain laterality, unspecified chronicity  · Treatment Diagnosis: impaired lumbosacral alignment, lumbar hypomobility, soft tissue decreased flexibility, decreased core/LE strength   Insurance/Certification information:  PT Insurance Information: [place] ([place]) DN not covered  Physician Information:  Referring Practitioner: Karla Nugent MD  Plan of care signed (Y/N): [x]  Yes []  No    Date of Patient follow up with Physician: PRN     Progress Report: []  Yes  [x]  No     Date Range for reporting period:  Beginnin21  Ending:     Progress report due (10 Rx/or 30 days whichever is less): visit #10 or  (date)     Recertification due (POC duration/ or 90 days whichever is less): visit #16 or 21 (8 weeks)     Visit # Insurance Allowable Auth required? Date Range   5 [place] []  Yes  [x]  No PCY     Latex Allergy:  [x]NO      []YES  Preferred Language for Healthcare:   [x]English       []other:    Functional Scale:       Date assessed:  Oswestry: raw score = 8; dysfunction = 16%  21    Pain level:  2-3/10     SUBJECTIVE: pt states she is doing okay today. Reports the right side still gets her.      OBJECTIVE: See eval   Observation:    Test measurements:      RESTRICTIONS/PRECAUTIONS: n/a     Treatment based classification: hypomobility     Comparable sign: pain with end ranges     Exercises/Interventions:     Therapeutic Exercise (47629) Resistance / level Sets / Seconds Reps Notes / Cues   Nustep      IB calf stretch  HSS on step  30\"  30\" 2  2 B    LTR     ITB stretch w/ SOS SKTC Stretch     Seated lumbar flex / side bend       [employer] back lumbar stretch     Hip hikes 4\" 2 10 11/3 educated to perform in front of bathroom mirror at home for visual feedback   Lateral band walks  Monster Walks lime 2 laps at ballet bar     Sidelying hip IR (reverse clams)  Sidelying hip ER (clams) [place]  lime 2  2 10  10 Added 11/8   Bridge w/ Ball squeeze  3\" hold 20                  Therapeutic Activities (26277)                                   Neuromuscular Re-ed (10296)       Harjinder Kind  5\" 15    Seated on SB w/ pelvic mobility A/P, R/L, CW/CC 1 15 each    Quadruped UE reach  Quadruped LE knee lift to level pelvis with opp knee on airex      Multifidi walk outs w/ paloff press 15# 3 steps  3 presses x3 B Added 11/8   Cat/Camel  3\" hold 15 each Focused on lumbar / pelvic mobility                  Manual Intervention (35025)       Prone PA       GISTM/STM to R lateral quad and HS     Lumbar Manip       SI Manip       Hip belt mobs       Hip LA distraction                              Modalities:     Pt. Education:  -pt educated on diagnosis, prognosis and expectations for rehab  -all pt questions were answered    Home Exercise Prorgam:   Access Code: [email or web address]  URL: [email or web address]  Date: 11/01/2021  Prepared by: Arabella De Souza     Exercises  Seated Flexion Stretch with Swiss Ball - 1 x daily - 7 x weekly - 3 sets - 10 reps  Seated Thoracic Flexion and Rotation with Swiss Ball - 1 x daily - 7 x weekly - 3 sets - 10 reps  Supine ITB Stretch with Strap - 1 x daily - 7 x weekly - 3 sets - 10 reps  Hooklying Single Knee to Chest Stretch - 1 x daily - 7 x weekly - 3 sets - 10 reps  Supine Lower Trunk Rotation - 1 x daily - 7 x weekly - 3 sets - 10 reps  Clamshell with Resistance - 1 x daily - 7 x weekly - 2 sets - 10 reps - 5 hold  Sidelying Reverse Clamshell with Resistance - 1 x daily - 7 x weekly - 2 sets - 10 reps - 5 hold  Hip Hiking on Step - 1 x daily - 7 x weekly - 2 sets - 10 reps  Side Stepping with Resistance at Ankles - 1 x daily - 7 x weekly - 3 sets - 10 reps      Therapeutic Exercise and NMR EXR  [x] (92723) Provided verbal/tactile cueing for activities related to strengthening, flexibility, endurance, ROM  for improvements in proximal hip and core control with self care, mobility, lifting and ambulation.  [] (48786) Provided verbal/tactile cueing for activities related to improving balance, coordination, kinesthetic sense, posture, motor skill, proprioception  to assist with core control in self care, mobility, lifting, and ambulation.   [] (23175) Therapist is in constant attendance of 2 or more patients providing skilled therapy interventions, but not providing any significant amount of measurable one-on-one time to either patient, for improvements in LE, proximal hip, and core control in self care, mobility, lifting, ambulation and eccentric single leg control.      Therapeutic Activities:    [x] (03703 or 46748) Provided verbal/tactile cueing for activities related to improving balance, coordination, kinesthetic sense, posture, motor skill, proprioception and motor activation to allow for proper function  with self care and ADLs  [] (86670) Provided training and instruction to the patient for proper core and proximal hip recruitment and positioning with ambulation re-education     Home Exercise Program:    [x] (18830) Reviewed/Progressed HEP activities related to strengthening, flexibility, endurance, ROM of core, proximal hip and LE for functional self-care, mobility, lifting and ambulation   [] (21581) Reviewed/Progressed HEP activities related to improving balance, coordination, kinesthetic sense, posture, motor skill, proprioception of core, proximal hip and LE for self care, mobility, lifting, and ambulation      Manual Treatments:  PROM / STM / Oscillations-Mobs:  G-I, II, III, IV (PA's, Inf., Post.)  [] (53060) Provided manual therapy to mobilize proximal hip and LS spine soft tissue/joints for the purpose of modulating pain, promoting relaxation,  increasing ROM, reducing/eliminating soft tissue swelling/inflammation/restriction, improving soft tissue extensibility and allowing for proper ROM for normal function with self care, mobility, lifting and ambulation. Charges:  Timed Code Treatment Minutes: 43   Total Treatment Minutes: 43       [] EVAL - LOW (54993)   [] EVAL - MOD (59865)  [] EVAL - HIGH (68391)  [] RE-EVAL (95369)  [x] UA(17406) x 2       [] Ionto  [x] NMR (16306) x  1     [] Vaso  [] Manual (57397) x       [] Ultrasound  [] TA x         [] Mech Traction (84800)  [] Aquatic Therapy x     [] ES (un) (47049):   [] Home Management Training x  [] ES(attended) (83232)   [] Dry Needling 1-2 muscles (78638):  [] Dry Needling 3+ muscles (860196  [] Group:      [] Other:     Goals:   Patient stated goal: reduce pain   []? Progressing: []? Met: []? Not Met: []? Adjusted     Therapist goals for Patient:   Short Term Goals: To be achieved in: 2 weeks  1. Independent in HEP and progression per patient tolerance, in order to prevent re-injury. []? Progressing: []? Met: []? Not Met: []? Adjusted  2. Patient will have a decrease in pain to facilitate improvement in movement, function, and ADLs as indicated by Functional Deficits. []? Progressing: []? Met: []? Not Met: []? Adjusted     Long Term Goals: To be achieved in: 8 weeks  1. Disability index score of 10% or less for the GONZALO to assist with reaching prior level of function. []? Progressing: []? Met: []? Not Met: []? Adjusted  2. Patient will demonstrate increased AROM to WNL, good LS mobility, good hip ROM to allow for proper joint functioning as indicated by patients Functional Deficits. []? Progressing: []? Met: []? Not Met: []? Adjusted  3.  Patient will demonstrate an increase in B LE Strength  > 4/5 with good proximal hip and core activation to allow for proper functional mobility as indicated by patients Functional Deficits. []? Progressing: []? Met: []? Not Met: []? Adjusted  4. Patient will return to functional activities including walking > 1 mile or sitting > 1 hour without increased symptoms or restriction. []? Progressing: []? Met: []? Not Met: []? Adjusted  5. Patient will be able to perform household activities such as cooking a meal without increased symptoms or restriction. []? Progressing: []? Met: []? Not Met: []? Adjusted      Overall Progression Towards Functional goals/ Treatment Progress Update:  [] Patient is progressing as expected towards functional goals listed. [] Progression is slowed due to complexities/Impairments listed. [] Progression has been slowed due to co-morbidities. [x] Plan just implemented, too soon to assess goals progression <30days   [] Goals require adjustment due to lack of progress  [] Patient is not progressing as expected and requires additional follow up with physician  [] Other    Persisting Functional Limitations/Impairments:  [x]Sitting [x]Standing   [x]Walking []Squatting/bending    []Stairs []ADL's    []Transfers []Reaching  [x]Housework []Job related tasks  []Driving []Sports/Recreation   []Sleeping []Other:    ASSESSMENT:  Continued to focus on core control and hip strengthening this date. Pt tolerated all exercises well. Requires cueing for pelvic mobility verse lumbar spine movement. Pt continues to have increased tenderness to lateral thigh musculature, improving with manual techniques to reduce sensitivity. Updated HEP to progress hip strengthening and core control. Continue to progress core control, provide education on standing posture to reduce pain while cooking in kitchen, and strengthening of B LE's to improve standing and walking tolerance.     Treatment/Activity Tolerance:  [x] Patient able to complete tx  [] Patient limited by fatique  [] Patient limited by pain  [] Patient limited by other medical complications  [] Other:     Prognosis: [x] Good [x] Fair  [] Poor    Patient Requires Follow-up: [x] Yes  [] No    PLAN: See eval. PT 1-2x / week for 6-8 weeks. [x] Continue per plan of care [] Alter current plan (see comments)  [] Plan of care initiated [] Hold pending MD visit [] Discharge    Electronically signed by: Tiffanie Perry, PT, DPT      Note: If patient does not return for scheduled/ recommended follow up visits, this note will serve as a discharge from care along with most recent update on progress.

## 2021-11-17 ENCOUNTER — HOSPITAL ENCOUNTER (OUTPATIENT)
Dept: PHYSICAL THERAPY | Age: 71
Setting detail: THERAPIES SERIES
Discharge: HOME | End: 2021-11-17
Payer: MEDICARE

## 2021-11-17 PROCEDURE — 97530 THERAPEUTIC ACTIVITIES: CPT

## 2021-11-17 PROCEDURE — 97110 THERAPEUTIC EXERCISES: CPT

## 2021-11-17 PROCEDURE — 97140 MANUAL THERAPY 1/> REGIONS: CPT

## 2021-11-17 NOTE — FLOWSHEET NOTE
IB calf stretch  HSS on step  30\"  30\" 2  2 B    LTR     ITB stretch w/ SOS     SKTC Stretch     Seated lumbar flex / side bend       [place] back lumbar stretch     Hip hikes 4\" 11/3 educated to perform in front of bathroom mirror at home for visual feedback   Lateral band walks  Monster Walks lime    Sidelying hip IR (reverse clams)  Sidelying hip ER (clams) [place]  Lime 2  2 10 B  10 B Added 11/8   Bridge w/ Ball squeeze  3\" hold 20           Prone press up  2'  11/17 no change in pain, added to HEP                 Therapeutic Activities (87806)       Education: DDD and effects of positioning and transfers to reduce pain, wearing wide toe box shoes to reduce compression of neuroma during ambulation  X 12 min     Supine <> sit transfers  2x  11/17: cues for technique                 Neuromuscular Re-ed (66779)       [employer] ISOs     Seated on SB w/ pelvic mobility A/P, R/L, CW/CC    Quadruped UE reach  Quadruped LE knee lift to level pelvis with opp knee on airex      Multifidi walk outs w/ paloff press 15# Added 11/8   Cat/Camel  Focused on lumbar / pelvic mobility                  Manual Intervention (15063)       Prone PA       GISTM/STM to R lateral quad and HS     Lumbar Manip       SI Manip       Hip belt mobs  30\" on: 10\" off 8x    Hip LA distraction                              Modalities:     Pt. Education:  -pt educated on diagnosis, prognosis and expectations for rehab  -all pt questions were answered    Home Exercise Prorgam:   Access Code: [email or web address]  URL: [email or web address]  Date: 11/01/2021  Prepared by: Berenice Elmore     Exercises  Seated Flexion Stretch with Swiss Ball - 1 x daily - 7 x weekly - 3 sets - 10 reps  Seated Thoracic Flexion and Rotation with Swiss Ball - 1 x daily - 7 x weekly - 3 sets - 10 reps  Supine ITB Stretch with Strap - 1 x daily - 7 x weekly - 3 sets - 10 reps  Hooklying Single Knee to Chest Stretch - 1 x daily - 7 x weekly - 3 sets - 10 reps  Supine Lower Trunk Rotation - 1 x daily - 7 x weekly - 3 sets - 10 reps  Clamshell with Resistance - 1 x daily - 7 x weekly - 2 sets - 10 reps - 5 hold  Sidelying Reverse Clamshell with Resistance - 1 x daily - 7 x weekly - 2 sets - 10 reps - 5 hold  Hip Hiking on Step - 1 x daily - 7 x weekly - 2 sets - 10 reps  Side Stepping with Resistance at Ankles - 1 x daily - 7 x weekly - 3 sets - 10 reps      Therapeutic Exercise and NMR EXR  [x] (43873) Provided verbal/tactile cueing for activities related to strengthening, flexibility, endurance, ROM  for improvements in proximal hip and core control with self care, mobility, lifting and ambulation.  [] (07259) Provided verbal/tactile cueing for activities related to improving balance, coordination, kinesthetic sense, posture, motor skill, proprioception  to assist with core control in self care, mobility, lifting, and ambulation.   [] (90749) Therapist is in constant attendance of 2 or more patients providing skilled therapy interventions, but not providing any significant amount of measurable one-on-one time to either patient, for improvements in LE, proximal hip, and core control in self care, mobility, lifting, ambulation and eccentric single leg control.      Therapeutic Activities:    [x] (62241 or 07003) Provided verbal/tactile cueing for activities related to improving balance, coordination, kinesthetic sense, posture, motor skill, proprioception and motor activation to allow for proper function  with self care and ADLs  [] (15314) Provided training and instruction to the patient for proper core and proximal hip recruitment and positioning with ambulation re-education     Home Exercise Program:    [x] (07173) Reviewed/Progressed HEP activities related to strengthening, flexibility, endurance, ROM of core, proximal hip and LE for functional self-care, mobility, lifting and ambulation   [] (02702) Reviewed/Progressed HEP activities related to improving balance, coordination, kinesthetic sense, posture, motor skill, proprioception of core, proximal hip and LE for self care, mobility, lifting, and ambulation      Manual Treatments:  PROM / STM / Oscillations-Mobs:  G-I, II, III, IV (PA's, Inf., Post.)  [] (91760) Provided manual therapy to mobilize proximal hip and LS spine soft tissue/joints for the purpose of modulating pain, promoting relaxation,  increasing ROM, reducing/eliminating soft tissue swelling/inflammation/restriction, improving soft tissue extensibility and allowing for proper ROM for normal function with self care, mobility, lifting and ambulation. Charges:  Timed Code Treatment Minutes: 55   Total Treatment Minutes: 55       [] EVAL - LOW (00217)   [] EVAL - MOD (73436)  [] EVAL - HIGH (46565)  [] RE-EVAL (52606)  [x] RF(00315) x 2       [] Ionto  [] NMR (27000) x       [] Vaso  [x] Manual (19863) x   1    [] Ultrasound  [x] TA x 1        [] Mech Traction (30026)  [] Aquatic Therapy x     [] ES (un) (49802):   [] Home Management Training x  [] ES(attended) (86031)   [] Dry Needling 1-2 muscles (97166):  [] Dry Needling 3+ muscles (562672  [] Group:      [] Other:     Goals:   Patient stated goal: reduce pain   []? Progressing: []? Met: []? Not Met: []? Adjusted     Therapist goals for Patient:   Short Term Goals: To be achieved in: 2 weeks  1. Independent in HEP and progression per patient tolerance, in order to prevent re-injury. []? Progressing: []? Met: []? Not Met: []? Adjusted  2. Patient will have a decrease in pain to facilitate improvement in movement, function, and ADLs as indicated by Functional Deficits. []? Progressing: []? Met: []? Not Met: []? Adjusted     Long Term Goals: To be achieved in: 8 weeks  1. Disability index score of 10% or less for the GONZALO to assist with reaching prior level of function. []? Progressing: []? Met: []? Not Met: []? Adjusted  2.  Patient will demonstrate increased AROM to WNL, good LS mobility, good hip ROM to allow for proper joint functioning as indicated by patients Functional Deficits. []? Progressing: []? Met: []? Not Met: []? Adjusted  3. Patient will demonstrate an increase in B LE Strength  > 4/5 with good proximal hip and core activation to allow for proper functional mobility as indicated by patients Functional Deficits. []? Progressing: []? Met: []? Not Met: []? Adjusted  4. Patient will return to functional activities including walking > 1 mile or sitting > 1 hour without increased symptoms or restriction. []? Progressing: []? Met: []? Not Met: []? Adjusted  5. Patient will be able to perform household activities such as cooking a meal without increased symptoms or restriction. []? Progressing: []? Met: []? Not Met: []? Adjusted      Overall Progression Towards Functional goals/ Treatment Progress Update:  [] Patient is progressing as expected towards functional goals listed. [] Progression is slowed due to complexities/Impairments listed. [] Progression has been slowed due to co-morbidities. [x] Plan just implemented, too soon to assess goals progression <30days   [] Goals require adjustment due to lack of progress  [] Patient is not progressing as expected and requires additional follow up with physician  [] Other    Persisting Functional Limitations/Impairments:  [x]Sitting [x]Standing   [x]Walking []Squatting/bending    []Stairs []ADL's    []Transfers []Reaching  [x]Housework []Job related tasks  []Driving []Sports/Recreation   []Sleeping []Other:    ASSESSMENT:  Pt ambulating into clinic with inc antalgia this date. Trialed ball/belt traction this date and pt reported reduced symptoms during. Pt instructed to add prone press ups to HEP. Pt continues to present with dec proximal LE and core strength, dec NM control with SLS transitions. Discussed using a larger shoe box to allow for splaying of feet during gait to reduce strain on neuroma.  Continue to progress core control, provide education on standing posture to reduce pain while cooking in kitchen, and strengthening of B LE's to improve standing and walking tolerance. Treatment/Activity Tolerance:  [x] Patient able to complete tx  [] Patient limited by fatique  [] Patient limited by pain  [] Patient limited by other medical complications  [] Other:     Prognosis: [x] Good [x] Fair  [] Poor    Patient Requires Follow-up: [x] Yes  [] No    PLAN: See eval. PT 1-2x / week for 6-8 weeks. [x] Continue per plan of care [] Alter current plan (see comments)  [] Plan of care initiated [] Hold pending MD visit [] Discharge    Electronically signed by: Hipolito Bennett, PT, DPT      Note: If patient does not return for scheduled/ recommended follow up visits, this note will serve as a discharge from care along with most recent update on progress.

## 2021-11-23 ENCOUNTER — HOSPITAL ENCOUNTER (OUTPATIENT)
Dept: PHYSICAL THERAPY | Age: 71
Setting detail: THERAPIES SERIES
Discharge: HOME | End: 2021-11-23
Payer: MEDICARE

## 2021-11-23 PROCEDURE — 97110 THERAPEUTIC EXERCISES: CPT

## 2021-11-23 PROCEDURE — 97140 MANUAL THERAPY 1/> REGIONS: CPT

## 2021-11-23 NOTE — FLOWSHEET NOTE
IB calf stretch  HSS on step  30\"  30\" 2  2 B    LTR     ITB stretch w/ SOS     SKTC Stretch     Seated lumbar flex / side bend       [place] back lumbar stretch     Hip hikes 4\" 2 10 11/3 educated to perform in front of bathroom mirror at home for visual feedback   Lateral band walks  Monster Walks lime    Sidelying hip IR (reverse clams)  Sidelying hip ER (clams)   [place] 2  2 10 B  10 B 11/23 - difficult to perform reverse even without resistance this date    Bridge w/ Ball squeeze  3\" hold 20           Prone press up    11/17 no change in pain, added to HEP                 Therapeutic Activities (79575)       Education: DDD and effects of positioning and transfers to reduce pain, wearing wide toe box shoes to reduce compression of neuroma during ambulation      Supine <> sit transfers  2x  11/17: cues for technique                 Neuromuscular Re-ed (03211)       Deadbug ISOs     Seated on SB w/ pelvic mobility A/P, R/L, CW/CC    Quadruped UE reach  Quadruped LE knee lift to level pelvis with opp knee on airex      Multifidi walk outs w/ paloff press 15# Added 11/8   Cat/Camel  Focused on lumbar / pelvic mobility                  Manual Intervention (61186)       Prone PA       GISTM/STM to R lateral quad and HS With therastick 6'     Lumbar Manip       SI Manip       Hip belt mobs  30\" on: 10\" off 12x    Hip LA distraction  4'     Hip ROM / Stretching HS/ITB  8'     LTR - gentle OP  4'              Modalities:     Pt. Education:  -pt educated on diagnosis, prognosis and expectations for rehab  -all pt questions were answered    Home Exercise Prorgam:   Access Code: [email or web address]  URL: [email or web address]  Date: 11/01/2021  Prepared by: Ana Alanis     Exercises  Seated Flexion Stretch with Swiss Ball - 1 x daily - 7 x weekly - 3 sets - 10 reps  Seated Thoracic Flexion and Rotation with Swiss Ball - 1 x daily - 7 x weekly - 3 sets - 10 reps  Supine ITB Stretch with Strap - 1 x daily - 7 x weekly - 3 sets - 10 reps  Hooklying Single Knee to Chest Stretch - 1 x daily - 7 x weekly - 3 sets - 10 reps  Supine Lower Trunk Rotation - 1 x daily - 7 x weekly - 3 sets - 10 reps  Clamshell with Resistance - 1 x daily - 7 x weekly - 2 sets - 10 reps - 5 hold  Sidelying Reverse Clamshell with Resistance - 1 x daily - 7 x weekly - 2 sets - 10 reps - 5 hold  Hip Hiking on Step - 1 x daily - 7 x weekly - 2 sets - 10 reps  Side Stepping with Resistance at Ankles - 1 x daily - 7 x weekly - 3 sets - 10 reps      Therapeutic Exercise and NMR EXR  [x] (82050) Provided verbal/tactile cueing for activities related to strengthening, flexibility, endurance, ROM  for improvements in proximal hip and core control with self care, mobility, lifting and ambulation.  [] (93232) Provided verbal/tactile cueing for activities related to improving balance, coordination, kinesthetic sense, posture, motor skill, proprioception  to assist with core control in self care, mobility, lifting, and ambulation.   [] (05077) Therapist is in constant attendance of 2 or more patients providing skilled therapy interventions, but not providing any significant amount of measurable one-on-one time to either patient, for improvements in LE, proximal hip, and core control in self care, mobility, lifting, ambulation and eccentric single leg control.      Therapeutic Activities:    [x] (05413 or 52449) Provided verbal/tactile cueing for activities related to improving balance, coordination, kinesthetic sense, posture, motor skill, proprioception and motor activation to allow for proper function  with self care and ADLs  [] (63214) Provided training and instruction to the patient for proper core and proximal hip recruitment and positioning with ambulation re-education     Home Exercise Program:    [x] (32468) Reviewed/Progressed HEP activities related to strengthening, flexibility, endurance, ROM of core, proximal hip and LE for functional self-care, mobility, lifting and ambulation   [] (34480) Reviewed/Progressed HEP activities related to improving balance, coordination, kinesthetic sense, posture, motor skill, proprioception of core, proximal hip and LE for self care, mobility, lifting, and ambulation      Manual Treatments:  PROM / STM / Oscillations-Mobs:  G-I, II, III, IV (PA's, Inf., Post.)  [] (54506) Provided manual therapy to mobilize proximal hip and LS spine soft tissue/joints for the purpose of modulating pain, promoting relaxation,  increasing ROM, reducing/eliminating soft tissue swelling/inflammation/restriction, improving soft tissue extensibility and allowing for proper ROM for normal function with self care, mobility, lifting and ambulation. Charges:  Timed Code Treatment Minutes: 42   Total Treatment Minutes: 42       [] EVAL - LOW (93712)   [] EVAL - MOD (51689)  [] EVAL - HIGH (51221)  [] RE-EVAL (59164)  [x] [place](16179) x 1       [] Ionto  [] NMR (19239) x       [] Vaso  [x] Manual (53487) x 2    [] Ultrasound  [] TA x 1        [] Mech Traction (51163)  [] Aquatic Therapy x     [] ES (un) (06978):   [] Home Management Training x  [] ES(attended) (71986)   [] Dry Needling 1-2 muscles (51263):  [] Dry Needling 3+ muscles (612695  [] Group:      [] Other:     Goals:   Patient stated goal: reduce pain   []? Progressing: []? Met: []? Not Met: []? Adjusted     Therapist goals for Patient:   Short Term Goals: To be achieved in: 2 weeks  1. Independent in HEP and progression per patient tolerance, in order to prevent re-injury. []? Progressing: []? Met: []? Not Met: []? Adjusted  2. Patient will have a decrease in pain to facilitate improvement in movement, function, and ADLs as indicated by Functional Deficits. []? Progressing: []? Met: []? Not Met: []? Adjusted     Long Term Goals: To be achieved in: 8 weeks  1. Disability index score of 10% or less for the GONZALO to assist with reaching prior level of function. []? Progressing: []? Met: []?  Not Met: []? Adjusted  2. Patient will demonstrate increased AROM to WNL, good LS mobility, good hip ROM to allow for proper joint functioning as indicated by patients Functional Deficits. []? Progressing: []? Met: []? Not Met: []? Adjusted  3. Patient will demonstrate an increase in B LE Strength  > 4/5 with good proximal hip and core activation to allow for proper functional mobility as indicated by patients Functional Deficits. []? Progressing: []? Met: []? Not Met: []? Adjusted  4. Patient will return to functional activities including walking > 1 mile or sitting > 1 hour without increased symptoms or restriction. []? Progressing: []? Met: []? Not Met: []? Adjusted  5. Patient will be able to perform household activities such as cooking a meal without increased symptoms or restriction. []? Progressing: []? Met: []? Not Met: []? Adjusted      Overall Progression Towards Functional goals/ Treatment Progress Update:  [] Patient is progressing as expected towards functional goals listed. [] Progression is slowed due to complexities/Impairments listed. [] Progression has been slowed due to co-morbidities. [x] Plan just implemented, too soon to assess goals progression <30days   [] Goals require adjustment due to lack of progress  [] Patient is not progressing as expected and requires additional follow up with physician  [] Other    Persisting Functional Limitations/Impairments:  [x]Sitting [x]Standing   [x]Walking []Squatting/bending    []Stairs []ADL's    []Transfers []Reaching  [x]Housework []Job related tasks  []Driving []Sports/Recreation   []Sleeping []Other:    ASSESSMENT:  Increased time this date spent on manual due to pain levels and lack of progress with therapy thus far. Plan to have last two PT sessions and if no change in symptoms PT recommending referral out to ortho specialist. Pt stating understanding. Does feel that ROM/strength is improving but pain is not.  Pt continues to present with dec proximal LE and core strength, dec NM control with SLS transitions. Continue to progress core control, provide education on standing posture to reduce pain while cooking in kitchen, and strengthening of B LE's to improve standing and walking tolerance. Treatment/Activity Tolerance:  [x] Patient able to complete tx  [] Patient limited by fatique  [] Patient limited by pain  [] Patient limited by other medical complications  [] Other:     Prognosis: [x] Good [x] Fair  [] Poor    Patient Requires Follow-up: [x] Yes  [] No    PLAN: See eval. PT 1-2x / week for 6-8 weeks. [x] Continue per plan of care [] Alter current plan (see comments)  [] Plan of care initiated [] Hold pending MD visit [] Discharge    Electronically signed by: Dale Sevilla PT, DPT      Note: If patient does not return for scheduled/ recommended follow up visits, this note will serve as a discharge from care along with most recent update on progress.

## 2021-11-29 ENCOUNTER — HOSPITAL ENCOUNTER (OUTPATIENT)
Dept: PHYSICAL THERAPY | Age: 71
Setting detail: THERAPIES SERIES
Discharge: HOME | End: 2021-11-29
Payer: MEDICARE

## 2021-11-29 PROCEDURE — 97140 MANUAL THERAPY 1/> REGIONS: CPT

## 2021-11-29 PROCEDURE — 97110 THERAPEUTIC EXERCISES: CPT

## 2021-11-29 NOTE — FLOWSHEET NOTE
[facility]  Phone: [phone]   Fax: [phone]    Physical Therapy Treatment Note/ Progress Report:     Date:  2021    Patient Name:  Favio Valencia    :  1950  MRN: [id number]  Restrictions/Precautions:    Medical/Treatment Diagnosis Information:  · Diagnosis: M54.9 (ICD-10-CM) - Back pain, unspecified back location, unspecified back pain laterality, unspecified chronicity  · Treatment Diagnosis: impaired lumbosacral alignment, lumbar hypomobility, soft tissue decreased flexibility, decreased core/LE strength   Insurance/Certification information:  PT Insurance Information: [place] ([place]) DN not covered  Physician Information:  Referring Practitioner: Osman Hutton MD  Plan of care signed (Y/N): [x]  Yes []  No    Date of Patient follow up with Physician: PRN     Progress Report: []  Yes  [x]  No     Date Range for reporting period:  Beginnin21  Ending:     Progress report due (10 Rx/or 30 days whichever is less): visit #10 or 88/4/15 (date)     Recertification due (POC duration/ or 90 days whichever is less): visit #16 or 21 (8 weeks)     Visit # Insurance Allowable Auth required? Date Range   8 [place] []  Yes  [x]  No PCY     Latex Allergy:  [x]NO      []YES  Preferred Language for Healthcare:   [x]English       []other:    Functional Scale:       Date assessed:  Oswestry: raw score = 8; dysfunction = 16%  21    Pain level:  2-3/10     SUBJECTIVE:  Pt has been inconsistent with HEP due to mild exacerbation of symptoms. Pt reports pain gets up to ~3/10 when she first gets up out of a chair and walks after sitting for any length of time. Pt is establishing care with new PCP in early 2022, is wondering if she should wait until then to ask for referral to ortho or try to see them before then. Pt is unable to sleep on R side due to pain.     OBJECTIVE:   :   (-) prone propping to reduce pain  (-) Slump test    RESTRICTIONS/PRECAUTIONS: n/a     Treatment based classification: hypomobility     Comparable sign: pain with end ranges     Exercises/Interventions:     Therapeutic Exercise (82853) Resistance / level Sets / Seconds Reps Notes / Cues   Nustep  4'    IB calf stretch  HSS on step  30\"  30\" 2  2 B    LTR     ITB stretch w/ SOS     SKTC Stretch     Seated lumbar flex / side bend       [place] back lumbar stretch     Hip hikes 4\" 11/3 educated to perform in front of bathroom mirror at home for visual feedback   Lateral band walks  [employer] [place] 2 laps at ballet bar ea   Sidelying hip IR (reverse clams)  Sidelying hip ER (clams)   [place]   2   10 B 11/23 - difficult to perform reverse even without resistance this date    Bridge w/ Ball squeeze            Prone press up    11/17 no change in pain, added to HEP                 Therapeutic Activities (31656)       Education: DDD and effects of positioning and transfers to reduce pain, wearing wide toe box shoes to reduce compression of neuroma during ambulation      Supine <> sit transfers  2x  11/17: cues for technique                 Neuromuscular Re-ed (10613)       Deadbug ISOs     Seated on SB w/ pelvic mobility A/P, R/L, CW/CC    Quadruped UE reach  Quadruped LE knee lift to level pelvis with opp knee on airex      Multifidi walk outs w/ paloff press 15# Added 11/8   Cat/Camel  Focused on lumbar / pelvic mobility    Standing 3 way hip standing on airex peach 1 10 B Added 11/29          Manual Intervention (24749)       Prone PA       GISTM/STM to R lateral quad and HS With therastick 6'     Lumbar Manip       SI Manip       Hip belt mobs     Hip LA distraction  4'     Hip ROM / Stretching HS/ITB  2'     LTR - gentle OP                Modalities:     Pt. Education:  -pt educated on diagnosis, prognosis and expectations for rehab  -all pt questions were answered    Home Exercise Prorgam:   Access Code: [email or web address]  URL: [email or web address]  Date: 11/01/2021  Prepared by: Marc Crabtree     Exercises  Seated Flexion Stretch with Swiss Ball - 1 x daily - 7 x weekly - 3 sets - 10 reps  Seated Thoracic Flexion and Rotation with Swiss Ball - 1 x daily - 7 x weekly - 3 sets - 10 reps  Supine ITB Stretch with Strap - 1 x daily - 7 x weekly - 3 sets - 10 reps  Hooklying Single Knee to Chest Stretch - 1 x daily - 7 x weekly - 3 sets - 10 reps  Supine Lower Trunk Rotation - 1 x daily - 7 x weekly - 3 sets - 10 reps  Clamshell with Resistance - 1 x daily - 7 x weekly - 2 sets - 10 reps - 5 hold  Sidelying Reverse Clamshell with Resistance - 1 x daily - 7 x weekly - 2 sets - 10 reps - 5 hold  Hip Hiking on Step - 1 x daily - 7 x weekly - 2 sets - 10 reps  Side Stepping with Resistance at Ankles - 1 x daily - 7 x weekly - 3 sets - 10 reps      Therapeutic Exercise and NMR EXR  [x] (45271) Provided verbal/tactile cueing for activities related to strengthening, flexibility, endurance, ROM  for improvements in proximal hip and core control with self care, mobility, lifting and ambulation.  [] (91805) Provided verbal/tactile cueing for activities related to improving balance, coordination, kinesthetic sense, posture, motor skill, proprioception  to assist with core control in self care, mobility, lifting, and ambulation.   [] (94201) Therapist is in constant attendance of 2 or more patients providing skilled therapy interventions, but not providing any significant amount of measurable one-on-one time to either patient, for improvements in LE, proximal hip, and core control in self care, mobility, lifting, ambulation and eccentric single leg control.      Therapeutic Activities:    [x] (19483 or 30096) Provided verbal/tactile cueing for activities related to improving balance, coordination, kinesthetic sense, posture, motor skill, proprioception and motor activation to allow for proper function  with self care and ADLs  [] (92072) Provided training and instruction to the patient for proper core and proximal hip recruitment and positioning with ambulation re-education     Home Exercise Program:    [x] (48009) Reviewed/Progressed HEP activities related to strengthening, flexibility, endurance, ROM of core, proximal hip and LE for functional self-care, mobility, lifting and ambulation   [] (50580) Reviewed/Progressed HEP activities related to improving balance, coordination, kinesthetic sense, posture, motor skill, proprioception of core, proximal hip and LE for self care, mobility, lifting, and ambulation      Manual Treatments:  PROM / STM / Oscillations-Mobs:  G-I, II, III, IV (PA's, Inf., Post.)  [] (32885) Provided manual therapy to mobilize proximal hip and LS spine soft tissue/joints for the purpose of modulating pain, promoting relaxation,  increasing ROM, reducing/eliminating soft tissue swelling/inflammation/restriction, improving soft tissue extensibility and allowing for proper ROM for normal function with self care, mobility, lifting and ambulation. Charges:  Timed Code Treatment Minutes: 42   Total Treatment Minutes: 42       [] EVAL - LOW (66497)   [] EVAL - MOD (18039)  [] EVAL - HIGH (90100)  [] RE-EVAL (80154)  [x] LQ(74436) x 2       [] Ionto  [] NMR (77736) x       [] Vaso  [x] Manual (06089) x 1    [] Ultrasound  [] TA x 1        [] Mech Traction (94397)  [] Aquatic Therapy x     [] ES (un) (37286):   [] Home Management Training x  [] ES(attended) (21200)   [] Dry Needling 1-2 muscles (56097):  [] Dry Needling 3+ muscles (808945  [] Group:      [] Other:     Goals:   Patient stated goal: reduce pain   []? Progressing: []? Met: []? Not Met: []? Adjusted     Therapist goals for Patient:   Short Term Goals: To be achieved in: 2 weeks  1. Independent in HEP and progression per patient tolerance, in order to prevent re-injury. []? Progressing: []? Met: []? Not Met: []? Adjusted  2.  Patient will have a decrease in pain to facilitate improvement in movement, function, and ADLs as indicated by Functional Deficits. []? Progressing: []? Met: []? Not Met: []? Adjusted     Long Term Goals: To be achieved in: 8 weeks  1. Disability index score of 10% or less for the GONAZLO to assist with reaching prior level of function. []? Progressing: []? Met: []? Not Met: []? Adjusted  2. Patient will demonstrate increased AROM to WNL, good LS mobility, good hip ROM to allow for proper joint functioning as indicated by patients Functional Deficits. []? Progressing: []? Met: []? Not Met: []? Adjusted  3. Patient will demonstrate an increase in B LE Strength  > 4/5 with good proximal hip and core activation to allow for proper functional mobility as indicated by patients Functional Deficits. []? Progressing: []? Met: []? Not Met: []? Adjusted  4. Patient will return to functional activities including walking > 1 mile or sitting > 1 hour without increased symptoms or restriction. []? Progressing: []? Met: []? Not Met: []? Adjusted  5. Patient will be able to perform household activities such as cooking a meal without increased symptoms or restriction. []? Progressing: []? Met: []? Not Met: []? Adjusted      Overall Progression Towards Functional goals/ Treatment Progress Update:  [] Patient is progressing as expected towards functional goals listed. [] Progression is slowed due to complexities/Impairments listed. [] Progression has been slowed due to co-morbidities.   [x] Plan just implemented, too soon to assess goals progression <30days   [] Goals require adjustment due to lack of progress  [] Patient is not progressing as expected and requires additional follow up with physician  [] Other    Persisting Functional Limitations/Impairments:  [x]Sitting [x]Standing   [x]Walking []Squatting/bending    []Stairs []ADL's    []Transfers []Reaching  [x]Housework []Job related tasks  []Driving []Sports/Recreation   []Sleeping []Other:    ASSESSMENT:  Focused on hip strengthening this date to further address pt's lateral hip pain. Pt tolerated all without report of pain exacerbation. Pt continues to be very tender to palpate over R greater trochanter and lateral hip and thigh musculature. Discussed continuing therapy for further symptom management and pain reduction, as well as addressing strength deficits prior to following up with MD. Pt notes improvement in some areas, but not in her pain and function. Monitor for response to session and progress as able. Continue to progress core control, provide education on standing posture to reduce pain while cooking in kitchen, and strengthening of B LE's to improve standing and walking tolerance. Treatment/Activity Tolerance:  [x] Patient able to complete tx  [] Patient limited by fatique  [] Patient limited by pain  [] Patient limited by other medical complications  [] Other:     Prognosis: [x] Good [x] Fair  [] Poor    Patient Requires Follow-up: [x] Yes  [] No    PLAN: See eval. PT 1-2x / week for 6-8 weeks. [x] Continue per plan of care [] Alter current plan (see comments)  [] Plan of care initiated [] Hold pending MD visit [] Discharge    Electronically signed by: Elise Francis, PT, DPT      Note: If patient does not return for scheduled/ recommended follow up visits, this note will serve as a discharge from care along with most recent update on progress.

## 2021-12-02 ENCOUNTER — HOSPITAL ENCOUNTER (OUTPATIENT)
Dept: PHYSICAL THERAPY | Age: 71
Setting detail: THERAPIES SERIES
Discharge: HOME | End: 2021-12-02
Payer: MEDICARE

## 2021-12-02 PROCEDURE — 97110 THERAPEUTIC EXERCISES: CPT

## 2021-12-02 PROCEDURE — 97140 MANUAL THERAPY 1/> REGIONS: CPT

## 2021-12-02 PROCEDURE — 97530 THERAPEUTIC ACTIVITIES: CPT

## 2021-12-02 NOTE — FLOWSHEET NOTE
Shaw Chamorro  Phone: [phone]   Fax: [phone]    Physical Therapy Treatment Note/ Progress Report:     Date:  2021    Patient Name:  Jemima Thornton    :  1950  MRN: [id number]  Restrictions/Precautions:    Medical/Treatment Diagnosis Information:  · Diagnosis: M54.9 (ICD-10-CM) - Back pain, unspecified back location, unspecified back pain laterality, unspecified chronicity  · Treatment Diagnosis: impaired lumbosacral alignment, lumbar hypomobility, soft tissue decreased flexibility, decreased core/LE strength   Insurance/Certification information:  PT Insurance Information: [place] DN not covered  Physician Information:  Referring Practitioner: Lindy Seo MD  Plan of care signed (Y/N): [x]  Yes []  No    Date of Patient follow up with Physician: 21 to Elise Ribeiro     Progress Report: [x]  Yes  []  No     Date Range for reporting period:  Beginnin21  PN: 21   Ending:     Progress report due (10 Rx/or 30 days whichever is less): visit #19 or  (date)     Recertification due (POC duration/ or 90 days whichever is less): visit #16 or 21 (8 weeks)     Visit # Insurance Allowable Auth required? Date Range   9 [place] []  Yes  [x]  No PCY     Latex Allergy:  [x]NO      []YES  Preferred Language for Healthcare:   [x]English       []other:    Functional Scale:       Date assessed:  Oswestry: raw score = 9; dysfunction = 18%  21  Oswestry: raw score = 8; dysfunction = 16%  21    Pain level:  2-3/10     SUBJECTIVE: Pt states she believes she is doing well enough that she will wait until she sees her new MD in January to be referred to ortho. Will cont PT for another 4 weeks as it is helping some and she doesn't expect the pain to go completely away.      OBJECTIVE:   12/3: no significant changes since eval on objective measures   (-) prone propping to reduce pain  (-) Slump test    RESTRICTIONS/PRECAUTIONS: n/a     Treatment based classification: hypomobility     Comparable sign: pain with end ranges     ROM   Comments   Lumbar Flex 75%     Lumbar Ext 50%        ROM LEFT RIGHT Comments   Lumbar Side Bend 50% 25% Pain to R, pain on R when bending L   Lumbar Rotation 50% 50%  pain to R   Hip Flexion 100 100     Hip Abd         Hip ER 45 45     Hip IR 15 10          Strength / Myotomes LEFT RIGHT Comments   Hip Flexors (L1-2) 4- 4-     Hip Abductors 3+ 3+     Hip Extensors         Hip Internal Rotators  4 4-  pain on R   Hip External Rotators 3+ 3+ Pain on R   Quads (L2-4) 5- 5-     Hamstrings  5- 5-        Exercises/Interventions:     Therapeutic Exercise (51865) Resistance / level Sets / Seconds Reps Notes / Cues   Nustep  4'    IB calf stretch  HSS on step  30\"  30\" 2  2 B    LTR     ITB stretch w/ SOS     SKTC Stretch     Seated lumbar flex / side bend       [place] back lumbar stretch     Hip hikes 4\" 11/3 educated to perform in front of bathroom mirror at home for visual feedback   Lateral band walks  Monster Walks orange 15'  x4    Sidelying hip IR (reverse clams)  Sidelying hip ER (clams) 11/23 - difficult to perform reverse even without resistance this date    Bridge w/ Ball squeeze            Prone press up    11/17 no change in pain, added to HEP                 Therapeutic Activities (88678)       PN completion and patient Education, POC updates, ortho referral, answered questions  X 12 min     Supine <> sit transfers   11/17: cues for technique                 Neuromuscular Re-ed (74616)       Deadbug ISOs     Seated on SB w/ pelvic mobility A/P, R/L, CW/CC    Quadruped UE reach  Quadruped LE knee lift to level pelvis with opp knee on airex      Multifidi walk outs w/ paloff press 15# Added 11/8   Cat/Camel  Focused on lumbar / pelvic mobility    Standing 3 way hip standing on airex orange 1 10 B Added 11/29          Manual Intervention (27802)       Prone PA  3' GISTM/STM to R lateral quad and HS With therastick 6'     Lumbar Manip       SI Manip       Hip belt mobs     Hip LA distraction       Hip ROM / Stretching HS/ITB  2'     LTR - gentle OP  4'              Modalities:     Pt. Education:  -pt educated on diagnosis, prognosis and expectations for rehab  -all pt questions were answered    Home Exercise Prorgam:   Access Code: [email or web address]  URL: [email or web address]  Date: 11/01/2021  Prepared by: Sue Tyson     Exercises  Seated Flexion Stretch with Swiss Ball - 1 x daily - 7 x weekly - 3 sets - 10 reps  Seated Thoracic Flexion and Rotation with Swiss Ball - 1 x daily - 7 x weekly - 3 sets - 10 reps  Supine ITB Stretch with Strap - 1 x daily - 7 x weekly - 3 sets - 10 reps  Hooklying Single Knee to Chest Stretch - 1 x daily - 7 x weekly - 3 sets - 10 reps  Supine Lower Trunk Rotation - 1 x daily - 7 x weekly - 3 sets - 10 reps  Clamshell with Resistance - 1 x daily - 7 x weekly - 2 sets - 10 reps - 5 hold  Sidelying Reverse Clamshell with Resistance - 1 x daily - 7 x weekly - 2 sets - 10 reps - 5 hold  Hip Hiking on Step - 1 x daily - 7 x weekly - 2 sets - 10 reps  Side Stepping with Resistance at Ankles - 1 x daily - 7 x weekly - 3 sets - 10 reps      Therapeutic Exercise and NMR EXR  [x] (83486) Provided verbal/tactile cueing for activities related to strengthening, flexibility, endurance, ROM  for improvements in proximal hip and core control with self care, mobility, lifting and ambulation.  [] (90717) Provided verbal/tactile cueing for activities related to improving balance, coordination, kinesthetic sense, posture, motor skill, proprioception  to assist with core control in self care, mobility, lifting, and ambulation.   [] (98804) Therapist is in constant attendance of 2 or more patients providing skilled therapy interventions, but not providing any significant amount of measurable one-on-one time to either patient, for improvements in LE, proximal hip, and core control in self care, mobility, lifting, ambulation and eccentric single leg control. Therapeutic Activities:    [x] (67925 or 02183) Provided verbal/tactile cueing for activities related to improving balance, coordination, kinesthetic sense, posture, motor skill, proprioception and motor activation to allow for proper function  with self care and ADLs  [] (59429) Provided training and instruction to the patient for proper core and proximal hip recruitment and positioning with ambulation re-education     Home Exercise Program:    [x] (16795) Reviewed/Progressed HEP activities related to strengthening, flexibility, endurance, ROM of core, proximal hip and LE for functional self-care, mobility, lifting and ambulation   [] (26574) Reviewed/Progressed HEP activities related to improving balance, coordination, kinesthetic sense, posture, motor skill, proprioception of core, proximal hip and LE for self care, mobility, lifting, and ambulation      Manual Treatments:  PROM / STM / Oscillations-Mobs:  G-I, II, III, IV (PA's, Inf., Post.)  [] (12960) Provided manual therapy to mobilize proximal hip and LS spine soft tissue/joints for the purpose of modulating pain, promoting relaxation,  increasing ROM, reducing/eliminating soft tissue swelling/inflammation/restriction, improving soft tissue extensibility and allowing for proper ROM for normal function with self care, mobility, lifting and ambulation.        Charges:  Timed Code Treatment Minutes: 45   Total Treatment Minutes: 45       [] EVAL - LOW (04904)   [] EVAL - MOD (40479)  [] EVAL - HIGH (47323)  [] RE-EVAL (16896)  [x] UK(28514) x 1      [] Ionto  [] NMR (31249) x       [] Vaso  [x] Manual (34851) x 1    [] Ultrasound  [x] TA x 1        [] Mech Traction (57873)  [] Aquatic Therapy x     [] ES (un) (33348):   [] Home Management Training x  [] ES(attended) (45090)   [] Dry Needling 1-2 muscles (41670):  [] Dry Needling 3+ muscles (487199  [] Group: [] Other:     Goals:   Patient stated goal: reduce pain   [x]? Progressing: []? Met: []? Not Met: []? Adjusted     Therapist goals for Patient:   Short Term Goals: To be achieved in: 2 weeks  1. Independent in HEP and progression per patient tolerance, in order to prevent re-injury. []? Progressing: [x]? Met: []? Not Met: []? Adjusted  2. Patient will have a decrease in pain to facilitate improvement in movement, function, and ADLs as indicated by Functional Deficits. [x]? Progressing: []? Met: []? Not Met: []? Adjusted     Long Term Goals: To be achieved in: 8 weeks  1. Disability index score of 10% or less for the GONZALO to assist with reaching prior level of function. [x]? Progressing: []? Met: []? Not Met: []? Adjusted  2. Patient will demonstrate increased AROM to WNL, good LS mobility, good hip ROM to allow for proper joint functioning as indicated by patients Functional Deficits. [x]? Progressing: []? Met: []? Not Met: []? Adjusted  3. Patient will demonstrate an increase in B LE Strength  > 4/5 with good proximal hip and core activation to allow for proper functional mobility as indicated by patients Functional Deficits. [x]? Progressing: []? Met: []? Not Met: []? Adjusted  4. Patient will return to functional activities including walking > 1 mile or sitting > 1 hour without increased symptoms or restriction. [x]? Progressing: []? Met: []? Not Met: []? Adjusted  5. Patient will be able to perform household activities such as cooking a meal without increased symptoms or restriction. [x]? Progressing: []? Met: []? Not Met: []? Adjusted      Overall Progression Towards Functional goals/ Treatment Progress Update:  [] Patient is progressing as expected towards functional goals listed. [] Progression is slowed due to complexities/Impairments listed. [x] Progression has been slowed due to co-morbidities.   [] Plan just implemented, too soon to assess goals progression <30days   [] Goals require adjustment due to lack of progress  [] Patient is not progressing as expected and requires additional follow up with physician  [] Other    Persisting Functional Limitations/Impairments:  [x]Sitting [x]Standing   [x]Walking []Squatting/bending    []Stairs []ADL's    []Transfers []Reaching  [x]Housework []Job related tasks  []Driving []Sports/Recreation   []Sleeping []Other:    ASSESSMENT:  PN completed this date with no significant changes in patient's objective measures or GONZALO score. However, pt reports she is seeing improvements with therapy in tolerance to activity level, even though the pain continues. She is understanding her pain may not subside completely with therapy but would like to continue for strengthening and flexibility gains prior to seeing MD in January. Continue to progress core control, provide education on standing posture to reduce pain while cooking in kitchen, and strengthening of B LE's to improve standing and walking tolerance. Treatment/Activity Tolerance:  [x] Patient able to complete tx  [] Patient limited by fatique  [] Patient limited by pain  [] Patient limited by other medical complications  [] Other:     Prognosis: [x] Good [x] Fair  [] Poor    Patient Requires Follow-up: [x] Yes  [] No    PLAN: See eval. PT 1-2x / week for 6-8 weeks. [x] Continue per plan of care [] Alter current plan (see comments)  [] Plan of care initiated [] Hold pending MD visit [] Discharge    Electronically signed by: Desiree Novak, PT, DPT      Note: If patient does not return for scheduled/ recommended follow up visits, this note will serve as a discharge from care along with most recent update on progress.

## 2021-12-07 ENCOUNTER — HOSPITAL ENCOUNTER (OUTPATIENT)
Dept: PHYSICAL THERAPY | Age: 71
Setting detail: THERAPIES SERIES
Discharge: HOME | End: 2021-12-07
Payer: MEDICARE

## 2021-12-07 PROCEDURE — 97140 MANUAL THERAPY 1/> REGIONS: CPT

## 2021-12-07 PROCEDURE — 97112 NEUROMUSCULAR REEDUCATION: CPT

## 2021-12-07 PROCEDURE — 97110 THERAPEUTIC EXERCISES: CPT

## 2021-12-07 NOTE — FLOWSHEET NOTE
Shaw Sigala  Phone: [phone]   Fax: [phone]    Physical Therapy Treatment Note/ Progress Report:     Date:  2021    Patient Name:  Tima Reyes    :  1950  MRN: [id number]  Restrictions/Precautions:    Medical/Treatment Diagnosis Information:  · Diagnosis: M54.9 (ICD-10-CM) - Back pain, unspecified back location, unspecified back pain laterality, unspecified chronicity  · Treatment Diagnosis: impaired lumbosacral alignment, lumbar hypomobility, soft tissue decreased flexibility, decreased core/LE strength   Insurance/Certification information:  PT Insurance Information: [place] ([place] DN not covered  Physician Information:  Referring Practitioner: Kaleigh Harris MD  Plan of care signed (Y/N): [x]  Yes []  No    Date of Patient follow up with Physician: 21 to Cricket Faust     Progress Report: [x]  Yes  []  No     Date Range for reporting period:  Beginnin21  PN: 21   Ending:     Progress report due (10 Rx/or 30 days whichever is less): visit #19 or 5/3/27 (date)     Recertification due (POC duration/ or 90 days whichever is less): visit #16 or 21 (8 weeks)     Visit # Insurance Allowable Auth required? Date Range   10 [place] []  Yes  [x]  No PCY     Latex Allergy:  [x]NO      []YES  Preferred Language for Healthcare:   [x]English       []other:    Functional Scale:       Date assessed:  Oswestry: raw score = 9; dysfunction = 18%  21  Oswestry: raw score = 8; dysfunction = 16%  21    Pain level:  2-3/10     SUBJECTIVE: Pt states she is having a little more pain in the hip and back due to trying to cut her own toe nails yesterday. Her hip just struggles with that motion.      OBJECTIVE:   12/3: no significant changes since eval on objective measures   (-) prone propping to reduce pain  (-) Slump test    RESTRICTIONS/PRECAUTIONS: n/a     Treatment based classification: hypomobility     Comparable sign: Manual Intervention (14026)      Prone PA      GISTM/STM to R lateral quad and HS     Lumbar Manip       SI Manip       Hip belt mobs  30\" on: 10\" off 12x    Hip LA distraction  4'     Hip ROM / Stretching HS/ITB  2'     LTR - gentle OP  4'              Modalities:     Pt. Education:  -pt educated on diagnosis, prognosis and expectations for rehab  -all pt questions were answered    Home Exercise Prorgam:   Access Code: [email or web address]  URL: [email or web address]  Date: 11/01/2021  Prepared by: Funmi Groves     Exercises  Seated Flexion Stretch with Swiss Ball - 1 x daily - 7 x weekly - 3 sets - 10 reps  Seated Thoracic Flexion and Rotation with Swiss Ball - 1 x daily - 7 x weekly - 3 sets - 10 reps  Supine ITB Stretch with Strap - 1 x daily - 7 x weekly - 3 sets - 10 reps  Hooklying Single Knee to Chest Stretch - 1 x daily - 7 x weekly - 3 sets - 10 reps  Supine Lower Trunk Rotation - 1 x daily - 7 x weekly - 3 sets - 10 reps  Clamshell with Resistance - 1 x daily - 7 x weekly - 2 sets - 10 reps - 5 hold  Sidelying Reverse Clamshell with Resistance - 1 x daily - 7 x weekly - 2 sets - 10 reps - 5 hold  Hip Hiking on Step - 1 x daily - 7 x weekly - 2 sets - 10 reps  Side Stepping with Resistance at Ankles - 1 x daily - 7 x weekly - 3 sets - 10 reps      Therapeutic Exercise and NMR EXR  [x] (54108) Provided verbal/tactile cueing for activities related to strengthening, flexibility, endurance, ROM  for improvements in proximal hip and core control with self care, mobility, lifting and ambulation.  [] (16334) Provided verbal/tactile cueing for activities related to improving balance, coordination, kinesthetic sense, posture, motor skill, proprioception  to assist with core control in self care, mobility, lifting, and ambulation.   [] (56795) Therapist is in constant attendance of 2 or more patients providing skilled therapy interventions, but not providing any significant amount of measurable one-on-one time to either patient, for improvements in LE, proximal hip, and core control in self care, mobility, lifting, ambulation and eccentric single leg control. Therapeutic Activities:    [x] (65597 or 45804) Provided verbal/tactile cueing for activities related to improving balance, coordination, kinesthetic sense, posture, motor skill, proprioception and motor activation to allow for proper function  with self care and ADLs  [] (27370) Provided training and instruction to the patient for proper core and proximal hip recruitment and positioning with ambulation re-education     Home Exercise Program:    [x] (98705) Reviewed/Progressed HEP activities related to strengthening, flexibility, endurance, ROM of core, proximal hip and LE for functional self-care, mobility, lifting and ambulation   [] (67329) Reviewed/Progressed HEP activities related to improving balance, coordination, kinesthetic sense, posture, motor skill, proprioception of core, proximal hip and LE for self care, mobility, lifting, and ambulation      Manual Treatments:  PROM / STM / Oscillations-Mobs:  G-I, II, III, IV (PA's, Inf., Post.)  [] (43739) Provided manual therapy to mobilize proximal hip and LS spine soft tissue/joints for the purpose of modulating pain, promoting relaxation,  increasing ROM, reducing/eliminating soft tissue swelling/inflammation/restriction, improving soft tissue extensibility and allowing for proper ROM for normal function with self care, mobility, lifting and ambulation.        Charges:  Timed Code Treatment Minutes: 45   Total Treatment Minutes: 45       [] EVAL - LOW (45503)   [] EVAL - MOD (77520)  [] EVAL - HIGH (20216)  [] RE-EVAL (06010)  [x] ZT(43635) x 1      [] Ionto  [x] NMR (13374) x 1      [] Vaso  [x] Manual (86628) x 1    [] Ultrasound  [] TA x 1        [] Mech Traction (95975)  [] Aquatic Therapy x     [] ES (un) (98892):   [] Home Management Training x  [] ES(attended) (73550)   [] Dry Needling 1-2 muscles (20062):  [] Dry Needling 3+ muscles (762958  [] Group:      [] Other:     Goals:   Patient stated goal: reduce pain   [x]? Progressing: []? Met: []? Not Met: []? Adjusted     Therapist goals for Patient:   Short Term Goals: To be achieved in: 2 weeks  1. Independent in HEP and progression per patient tolerance, in order to prevent re-injury. []? Progressing: [x]? Met: []? Not Met: []? Adjusted  2. Patient will have a decrease in pain to facilitate improvement in movement, function, and ADLs as indicated by Functional Deficits. [x]? Progressing: []? Met: []? Not Met: []? Adjusted     Long Term Goals: To be achieved in: 8 weeks  1. Disability index score of 10% or less for the GONZALO to assist with reaching prior level of function. [x]? Progressing: []? Met: []? Not Met: []? Adjusted  2. Patient will demonstrate increased AROM to WNL, good LS mobility, good hip ROM to allow for proper joint functioning as indicated by patients Functional Deficits. [x]? Progressing: []? Met: []? Not Met: []? Adjusted  3. Patient will demonstrate an increase in B LE Strength  > 4/5 with good proximal hip and core activation to allow for proper functional mobility as indicated by patients Functional Deficits. [x]? Progressing: []? Met: []? Not Met: []? Adjusted  4. Patient will return to functional activities including walking > 1 mile or sitting > 1 hour without increased symptoms or restriction. [x]? Progressing: []? Met: []? Not Met: []? Adjusted  5. Patient will be able to perform household activities such as cooking a meal without increased symptoms or restriction. [x]? Progressing: []? Met: []? Not Met: []? Adjusted      Overall Progression Towards Functional goals/ Treatment Progress Update:  [] Patient is progressing as expected towards functional goals listed. [] Progression is slowed due to complexities/Impairments listed. [x] Progression has been slowed due to co-morbidities.   [] Plan just implemented, too soon to assess goals progression <30days   [] Goals require adjustment due to lack of progress  [] Patient is not progressing as expected and requires additional follow up with physician  [] Other    Persisting Functional Limitations/Impairments:  [x]Sitting [x]Standing   [x]Walking []Squatting/bending    []Stairs []ADL's    []Transfers []Reaching  [x]Housework []Job related tasks  []Driving []Sports/Recreation   []Sleeping []Other:    ASSESSMENT:  Emphasis spent this date on pelvic alignment for maintaining neutral spine with TA contraction. Progressed from table exercises to standing, all outlined in bold above. Following exercise and manual intervention pt reported significant decrease in symptoms. Continue to progress core control, provide education on standing posture to reduce pain while cooking in kitchen, and strengthening of B LE's to improve standing and walking tolerance. Treatment/Activity Tolerance:  [x] Patient able to complete tx  [] Patient limited by fatique  [] Patient limited by pain  [] Patient limited by other medical complications  [] Other:     Prognosis: [x] Good [x] Fair  [] Poor    Patient Requires Follow-up: [x] Yes  [] No    PLAN: See eval. PT 1-2x / week for 6-8 weeks. [x] Continue per plan of care [] Alter current plan (see comments)  [] Plan of care initiated [] Hold pending MD visit [] Discharge    Electronically signed by: Cristela Perez, PT, DPT      Note: If patient does not return for scheduled/ recommended follow up visits, this note will serve as a discharge from care along with most recent update on progress.

## 2021-12-10 ENCOUNTER — HOSPITAL ENCOUNTER (OUTPATIENT)
Dept: PHYSICAL THERAPY | Age: 71
Setting detail: THERAPIES SERIES
Discharge: HOME | End: 2021-12-10
Payer: MEDICARE

## 2021-12-10 PROCEDURE — 97112 NEUROMUSCULAR REEDUCATION: CPT

## 2021-12-10 PROCEDURE — 97140 MANUAL THERAPY 1/> REGIONS: CPT

## 2021-12-10 PROCEDURE — 97110 THERAPEUTIC EXERCISES: CPT

## 2021-12-10 NOTE — FLOWSHEET NOTE
Shaw Chamorro  Phone: [phone]   Fax: [phone]    Physical Therapy Treatment Note/ Progress Report:     Date:  12/10/2021    Patient Name:  Catherine Ibrahim    :  1950  MRN: [id number]  Restrictions/Precautions:    Medical/Treatment Diagnosis Information:  · Diagnosis: M54.9 (ICD-10-CM) - Back pain, unspecified back location, unspecified back pain laterality, unspecified chronicity  · Treatment Diagnosis: impaired lumbosacral alignment, lumbar hypomobility, soft tissue decreased flexibility, decreased core/LE strength   Insurance/Certification information:  PT Insurance Information: [place] ([place] DN not covered  Physician Information:  Referring Practitioner: Albertina Luis MD  Plan of care signed (Y/N): [x]  Yes []  No    Date of Patient follow up with Physician: 21 to [facility]     Progress Report: []  Yes  [x]  No     Date Range for reporting period:  Beginnin21  PN: 21   Ending:     Progress report due (10 Rx/or 30 days whichever is less): visit #19 or 99 (date)     Recertification due (POC duration/ or 90 days whichever is less): visit #16 or 21 (8 weeks)     Visit # Insurance Allowable Auth required? Date Range   11 [place] []  Yes  [x]  No PCY     Latex Allergy:  [x]NO      []YES  Preferred Language for Healthcare:   [x]English       []other:    Functional Scale:       Date assessed:  Oswestry: raw score = 9; dysfunction = 18%  21  Oswestry: raw score = 8; dysfunction = 16%  21    Pain level:  1-2/10     SUBJECTIVE: Pt reports she is feeling a little bit better today, denies significant pain.                                                                                                                                         OBJECTIVE:   12/3: no significant changes since eval on objective measures   (-) prone propping to reduce pain  (-) Slump test    RESTRICTIONS/PRECAUTIONS: n/a     Treatment based classification: hypomobility     Comparable sign: pain with end ranges     ROM   Comments   Lumbar Flex 75%     Lumbar Ext 50%        ROM LEFT RIGHT Comments   Lumbar Side Bend 50% 25% Pain to R, pain on R when bending L   Lumbar Rotation 50% 50%  pain to R   Hip Flexion 100 100     Hip Abd         Hip ER 45 45     Hip IR 15 10          Strength / Myotomes LEFT RIGHT Comments   Hip Flexors (L1-2) 4- 4-     Hip Abductors 3+ 3+     Hip Extensors         Hip Internal Rotators  4 4-  pain on R   Hip External Rotators 3+ 3+ Pain on R   Quads (L2-4) 5- 5-     Hamstrings  5- 5-        Exercises/Interventions:     Therapeutic Exercise (95919) Resistance / level Sets / Seconds Reps Notes / Cues   Nustep  4'    IB calf stretch  HSS on step  30\"  30\" 2  2 B    LTR     ITB stretch w/ SOS     SKTC Stretch     Seated lumbar flex / side bend       [place] back lumbar stretch     Hip hikes 4\" 11/3 educated to perform in front of bathroom mirror at home for visual feedback   Lateral band walks  Monster Walks    Sidelying hip IR (reverse clams)  Sidelying hip ER (clams) 11/23 - difficult to perform reverse even without resistance this date    Bridge w/ Ball squeeze  3\" hold 20           Prone press up    11/17 no change in pain, added to HEP                 Therapeutic Activities (23489)       PN completion and patient Education, POC updates, ortho referral, answered questions       Supine <> sit transfers   11/17: cues for technique                 Neuromuscular Re-ed (54348)       Deadbug ISOs     Seated on SB w/ pelvic mobility A/P, R/L, CW/CC    Quadruped UE reach  Quadruped LE knee lift to level pelvis with opp knee on airex      Multifidi walk outs w/ paloff press 15# Added 11/8   Cat/Camel  Focused on lumbar / pelvic mobility    Standing 3 way hip standing on airex Added 11/29   Supine TA contraction  5\" 15    Supine TA w/ March  2 10    Supine TA w/ SL BKFO no TB  2 10    Supine TA w/ BKFO TB lime 2 10    Bridge w/ TA and TB lime 2 10    Standing stir the pot w/ TA lime                  Manual Intervention (38933)      Prone PA      GISTM/STM to R lateral quad and HS     Lumbar Manip       SI Manip       Hip belt mobs     Hip LA distraction  4'     Hip ROM / Stretching HS/ITB  2'     LTR - gentle OP       Ball/belt traction  30\" on: 10\" off 8'      Modalities:     Pt. Education:  -pt educated on diagnosis, prognosis and expectations for rehab  -all pt questions were answered    Home Exercise Prorgam:   Access Code: [email or web address]  URL: [email or web address]  Date: 11/01/2021  Prepared by: Samara Obrien     Exercises  Seated Flexion Stretch with Swiss Ball - 1 x daily - 7 x weekly - 3 sets - 10 reps  Seated Thoracic Flexion and Rotation with Swiss Ball - 1 x daily - 7 x weekly - 3 sets - 10 reps  Supine ITB Stretch with Strap - 1 x daily - 7 x weekly - 3 sets - 10 reps  Hooklying Single Knee to Chest Stretch - 1 x daily - 7 x weekly - 3 sets - 10 reps  Supine Lower Trunk Rotation - 1 x daily - 7 x weekly - 3 sets - 10 reps  Clamshell with Resistance - 1 x daily - 7 x weekly - 2 sets - 10 reps - 5 hold  Sidelying Reverse Clamshell with Resistance - 1 x daily - 7 x weekly - 2 sets - 10 reps - 5 hold  Hip Hiking on Step - 1 x daily - 7 x weekly - 2 sets - 10 reps  Side Stepping with Resistance at Ankles - 1 x daily - 7 x weekly - 3 sets - 10 reps      Therapeutic Exercise and NMR EXR  [x] (28885) Provided verbal/tactile cueing for activities related to strengthening, flexibility, endurance, ROM  for improvements in proximal hip and core control with self care, mobility, lifting and ambulation.  [] (09208) Provided verbal/tactile cueing for activities related to improving balance, coordination, kinesthetic sense, posture, motor skill, proprioception  to assist with core control in self care, mobility, lifting, and ambulation.   [] (13563) Therapist is in constant attendance of 2 or more patients providing skilled therapy interventions, but not providing any significant amount of measurable one-on-one time to either patient, for improvements in LE, proximal hip, and core control in self care, mobility, lifting, ambulation and eccentric single leg control. Therapeutic Activities:    [x] (48562 or 07343) Provided verbal/tactile cueing for activities related to improving balance, coordination, kinesthetic sense, posture, motor skill, proprioception and motor activation to allow for proper function  with self care and ADLs  [] (75673) Provided training and instruction to the patient for proper core and proximal hip recruitment and positioning with ambulation re-education     Home Exercise Program:    [x] (32017) Reviewed/Progressed HEP activities related to strengthening, flexibility, endurance, ROM of core, proximal hip and LE for functional self-care, mobility, lifting and ambulation   [] (09984) Reviewed/Progressed HEP activities related to improving balance, coordination, kinesthetic sense, posture, motor skill, proprioception of core, proximal hip and LE for self care, mobility, lifting, and ambulation      Manual Treatments:  PROM / STM / Oscillations-Mobs:  G-I, II, III, IV (PA's, Inf., Post.)  [] (40241) Provided manual therapy to mobilize proximal hip and LS spine soft tissue/joints for the purpose of modulating pain, promoting relaxation,  increasing ROM, reducing/eliminating soft tissue swelling/inflammation/restriction, improving soft tissue extensibility and allowing for proper ROM for normal function with self care, mobility, lifting and ambulation.        Charges:  Timed Code Treatment Minutes: 45   Total Treatment Minutes: 45       [] EVAL - LOW (72144)   [] EVAL - MOD (85842)  [] EVAL - HIGH (17227)  [] RE-EVAL (59991)  [x] QG(57870) x 1      [] Ionto  [x] NMR (26766) x 1      [] Vaso  [x] Manual (29859) x 1    [] Ultrasound  [] TA x 1        [] Mech Traction (64150)  [] Aquatic Therapy x     [] ES (un) (59676):   [] Progression has been slowed due to co-morbidities. [] Plan just implemented, too soon to assess goals progression <30days   [] Goals require adjustment due to lack of progress  [] Patient is not progressing as expected and requires additional follow up with physician  [] Other    Persisting Functional Limitations/Impairments:  [x]Sitting [x]Standing   [x]Walking []Squatting/bending    []Stairs []ADL's    []Transfers []Reaching  [x]Housework []Job related tasks  []Driving []Sports/Recreation   []Sleeping []Other:    ASSESSMENT:  Continued with core control exercises to improve postural alignment in non-WB and WBing positions. Pt cued to count during TrA sets as pt tended to hold breath during activity. Pt denied pain exacerbation at end of treatment. Continue to progress core control, provide education on standing posture to reduce pain while cooking in kitchen, and strengthening of B LE's to improve standing and walking tolerance. Treatment/Activity Tolerance:  [x] Patient able to complete tx  [] Patient limited by fatique  [] Patient limited by pain  [] Patient limited by other medical complications  [] Other:     Prognosis: [x] Good [x] Fair  [] Poor    Patient Requires Follow-up: [x] Yes  [] No    PLAN: See eval. PT 1-2x / week for 6-8 weeks. [x] Continue per plan of care [] Alter current plan (see comments)  [] Plan of care initiated [] Hold pending MD visit [] Discharge    Electronically signed by: Anival Hernandez, PT, DPT      Note: If patient does not return for scheduled/ recommended follow up visits, this note will serve as a discharge from care along with most recent update on progress.

## 2021-12-13 ENCOUNTER — HOSPITAL ENCOUNTER (OUTPATIENT)
Dept: PHYSICAL THERAPY | Age: 71
Setting detail: THERAPIES SERIES
Discharge: HOME | End: 2021-12-13
Payer: MEDICARE

## 2021-12-13 PROCEDURE — 97140 MANUAL THERAPY 1/> REGIONS: CPT

## 2021-12-13 PROCEDURE — 97112 NEUROMUSCULAR REEDUCATION: CPT

## 2021-12-13 PROCEDURE — 97110 THERAPEUTIC EXERCISES: CPT

## 2021-12-13 NOTE — FLOWSHEET NOTE
Shaw Sigala  Phone: [phone]   Fax: [phone]    Physical Therapy Treatment Note/ Progress Report:     Date:  2021    Patient Name:  Rick Patel    :  1950  MRN: [id number]  Restrictions/Precautions:    Medical/Treatment Diagnosis Information:  · Diagnosis: M54.9 (ICD-10-CM) - Back pain, unspecified back location, unspecified back pain laterality, unspecified chronicity  · Treatment Diagnosis: impaired lumbosacral alignment, lumbar hypomobility, soft tissue decreased flexibility, decreased core/LE strength   Insurance/Certification information:  PT Insurance Information: [place] ([place] DN not covered  Physician Information:  Referring Practitioner: Teddy Amador MD  Plan of care signed (Y/N): [x]  Yes []  No    Date of Patient follow up with Physician: 21 to [facility]     Progress Report: []  Yes  [x]  No     Date Range for reporting period:  Beginnin21  PN: 21   Ending:     Progress report due (10 Rx/or 30 days whichever is less): visit #19 or 33 (date)     Recertification due (POC duration/ or 90 days whichever is less): visit #16 or 21 (8 weeks)     Visit # Insurance Allowable Auth required? Date Range   12 [place] []  Yes  [x]  No PCY     Latex Allergy:  [x]NO      []YES  Preferred Language for Healthcare:   [x]English       []other:    Functional Scale:       Date assessed:  Oswestry: raw score = 9; dysfunction = 18%  21  Oswestry: raw score = 8; dysfunction = 16%  21    Pain level:  1-2/10     SUBJECTIVE: Pt states she is feeling like the therapy is helping. Pain levels remain low from last session.                                                                                                                                        OBJECTIVE:   12/3: no significant changes since eval on objective measures   (-) prone propping to reduce pain  (-) Slump test    RESTRICTIONS/PRECAUTIONS: n/a     Treatment based classification: hypomobility     Comparable sign: pain with end ranges     ROM   Comments   Lumbar Flex 75%     Lumbar Ext 50%        ROM LEFT RIGHT Comments   Lumbar Side Bend 50% 25% Pain to R, pain on R when bending L   Lumbar Rotation 50% 50%  pain to R   Hip Flexion 100 100     Hip Abd         Hip ER 45 45     Hip IR 15 10          Strength / Myotomes LEFT RIGHT Comments   Hip Flexors (L1-2) 4- 4-     Hip Abductors 3+ 3+     Hip Extensors         Hip Internal Rotators  4 4-  pain on R   Hip External Rotators 3+ 3+ Pain on R   Quads (L2-4) 5- 5-     Hamstrings  5- 5-        Exercises/Interventions:     Therapeutic Exercise (06994) Resistance / level Sets / Seconds Reps Notes / Cues   Nustep  4'    IB calf stretch  HSS on step  30\"  30\" 2  2 B    LTR     ITB stretch w/ SOS     SKTC Stretch     Seated lumbar flex / side bend  15\" 4    [place] back lumbar stretch     Hip hikes 4\" 11/3 educated to perform in front of bathroom mirror at home for visual feedback   Lateral band walks  Monster Walks    Sidelying hip IR (reverse clams)  Sidelying hip ER (clams) 11/23 - difficult to perform reverse even without resistance this date    Bridge w/ Ball squeeze            Prone press up    11/17 no change in pain, added to HEP                 Therapeutic Activities (86644)       PN completion and patient Education, POC updates, ortho referral, answered questions       Supine <> sit transfers   11/17: cues for technique                 Neuromuscular Re-ed (10265)       Deadbug ISOs     Seated on SB w/ pelvic mobility A/P, R/L, CW/CC 1 15 each    Quadruped UE reach  Quadruped LE knee lift to level pelvis with opp knee on airex      Multifidi walk outs w/ paloff press 15# Added 11/8   Cat/Camel  Focused on lumbar / pelvic mobility    Standing 3 way hip standing on airex Added 11/29   Supine TA contraction  5\" 15    Supine TA w/ March  2 10    Supine TA w/ SL BKFO no TB  2 10    Supine TA w/ BKFO TB lime 2 10    Bridge w/ TA and TB lime 2 10    Standing stir the pot w/ TA lime                  Manual Intervention (45148)      Prone PA      GISTM/STM to R lateral quad and HS     Lumbar Manip       SI Manip       Hip belt mobs  30\" on: 10\" off 12x    Hip LA distraction  4'     Hip ROM / Stretching HS/ITB  2'     LTR - gentle OP       Ball/belt traction       Modalities:     Pt. Education:  -pt educated on diagnosis, prognosis and expectations for rehab  -all pt questions were answered    Home Exercise Prorgam:   Access Code: [email or web address]  URL: [email or web address]  Date: 11/01/2021  Prepared by: Ana Alanis     Exercises  Seated Flexion Stretch with Swiss Ball - 1 x daily - 7 x weekly - 3 sets - 10 reps  Seated Thoracic Flexion and Rotation with Swiss Ball - 1 x daily - 7 x weekly - 3 sets - 10 reps  Supine ITB Stretch with Strap - 1 x daily - 7 x weekly - 3 sets - 10 reps  Hooklying Single Knee to Chest Stretch - 1 x daily - 7 x weekly - 3 sets - 10 reps  Supine Lower Trunk Rotation - 1 x daily - 7 x weekly - 3 sets - 10 reps  Clamshell with Resistance - 1 x daily - 7 x weekly - 2 sets - 10 reps - 5 hold  Sidelying Reverse Clamshell with Resistance - 1 x daily - 7 x weekly - 2 sets - 10 reps - 5 hold  Hip Hiking on Step - 1 x daily - 7 x weekly - 2 sets - 10 reps  Side Stepping with Resistance at Ankles - 1 x daily - 7 x weekly - 3 sets - 10 reps      Therapeutic Exercise and NMR EXR  [x] (53454) Provided verbal/tactile cueing for activities related to strengthening, flexibility, endurance, ROM  for improvements in proximal hip and core control with self care, mobility, lifting and ambulation.  [] (24597) Provided verbal/tactile cueing for activities related to improving balance, coordination, kinesthetic sense, posture, motor skill, proprioception  to assist with core control in self care, mobility, lifting, and ambulation.   [] (12792) Therapist is in constant attendance of 2 or more patients providing skilled therapy interventions, but not providing any significant amount of measurable one-on-one time to either patient, for improvements in LE, proximal hip, and core control in self care, mobility, lifting, ambulation and eccentric single leg control. Therapeutic Activities:    [x] (03222 or 02825) Provided verbal/tactile cueing for activities related to improving balance, coordination, kinesthetic sense, posture, motor skill, proprioception and motor activation to allow for proper function  with self care and ADLs  [] (69769) Provided training and instruction to the patient for proper core and proximal hip recruitment and positioning with ambulation re-education     Home Exercise Program:    [x] (04538) Reviewed/Progressed HEP activities related to strengthening, flexibility, endurance, ROM of core, proximal hip and LE for functional self-care, mobility, lifting and ambulation   [] (22677) Reviewed/Progressed HEP activities related to improving balance, coordination, kinesthetic sense, posture, motor skill, proprioception of core, proximal hip and LE for self care, mobility, lifting, and ambulation      Manual Treatments:  PROM / STM / Oscillations-Mobs:  G-I, II, III, IV (PA's, Inf., Post.)  [] (31412) Provided manual therapy to mobilize proximal hip and LS spine soft tissue/joints for the purpose of modulating pain, promoting relaxation,  increasing ROM, reducing/eliminating soft tissue swelling/inflammation/restriction, improving soft tissue extensibility and allowing for proper ROM for normal function with self care, mobility, lifting and ambulation.        Charges:  Timed Code Treatment Minutes: 45   Total Treatment Minutes: 45       [] EVAL - LOW (70801)   [] EVAL - MOD (75995)  [] EVAL - HIGH (65467)  [] RE-EVAL (25459)  [x] FG(03180) x 1      [] Ionto  [x] NMR (61709) x 1      [] Vaso  [x] Manual (31641) x 1    [] Ultrasound  [] TA x 1        [] Mech Traction (42705)  [] Aquatic Therapy x     [] ES (un) (72850):   [] Home Management Training x  [] ES(attended) (02916)   [] Dry Needling 1-2 muscles (73799):  [] Dry Needling 3+ muscles (993202  [] Group:      [] Other:     Goals:   Patient stated goal: reduce pain   [x]? Progressing: []? Met: []? Not Met: []? Adjusted     Therapist goals for Patient:   Short Term Goals: To be achieved in: 2 weeks  1. Independent in HEP and progression per patient tolerance, in order to prevent re-injury. []? Progressing: [x]? Met: []? Not Met: []? Adjusted  2. Patient will have a decrease in pain to facilitate improvement in movement, function, and ADLs as indicated by Functional Deficits. [x]? Progressing: []? Met: []? Not Met: []? Adjusted     Long Term Goals: To be achieved in: 8 weeks  1. Disability index score of 10% or less for the GONZALO to assist with reaching prior level of function. [x]? Progressing: []? Met: []? Not Met: []? Adjusted  2. Patient will demonstrate increased AROM to WNL, good LS mobility, good hip ROM to allow for proper joint functioning as indicated by patients Functional Deficits. [x]? Progressing: []? Met: []? Not Met: []? Adjusted  3. Patient will demonstrate an increase in B LE Strength  > 4/5 with good proximal hip and core activation to allow for proper functional mobility as indicated by patients Functional Deficits. [x]? Progressing: []? Met: []? Not Met: []? Adjusted  4. Patient will return to functional activities including walking > 1 mile or sitting > 1 hour without increased symptoms or restriction. [x]? Progressing: []? Met: []? Not Met: []? Adjusted  5. Patient will be able to perform household activities such as cooking a meal without increased symptoms or restriction. [x]? Progressing: []? Met: []? Not Met: []? Adjusted      Overall Progression Towards Functional goals/ Treatment Progress Update:  [] Patient is progressing as expected towards functional goals listed.     [] Progression is slowed due to complexities/Impairments listed. [x] Progression has been slowed due to co-morbidities. [] Plan just implemented, too soon to assess goals progression <30days   [] Goals require adjustment due to lack of progress  [] Patient is not progressing as expected and requires additional follow up with physician  [] Other    Persisting Functional Limitations/Impairments:  [x]Sitting [x]Standing   [x]Walking []Squatting/bending    []Stairs []ADL's    []Transfers []Reaching  [x]Housework []Job related tasks  []Driving []Sports/Recreation   []Sleeping []Other:    ASSESSMENT:  No progressions this date as current routine appropriately challenging and beneficial. Continued with manual intervention to address hip and spine hypomobility. Continued with core control exercises to improve postural alignment in non-WB and WBing positions. Ambulated out of clinic with cues for TA contraction with decreased discomfort per patient report. Continue to progress core control, provide education on standing posture to reduce pain while cooking in kitchen, and strengthening of B LE's to improve standing and walking tolerance. Treatment/Activity Tolerance:  [x] Patient able to complete tx  [] Patient limited by fatique  [] Patient limited by pain  [] Patient limited by other medical complications  [] Other:     Prognosis: [x] Good [x] Fair  [] Poor    Patient Requires Follow-up: [x] Yes  [] No    PLAN: See [facility] PT 1-2x / week for 6-8 weeks. [x] Continue per plan of care [] Alter current plan (see comments)  [] Plan of care initiated [] Hold pending MD visit [] Discharge    Electronically signed by: Rashaun Easley, PT, DPT      Note: If patient does not return for scheduled/ recommended follow up visits, this note will serve as a discharge from care along with most recent update on progress.

## 2021-12-17 ENCOUNTER — HOSPITAL ENCOUNTER (OUTPATIENT)
Dept: PHYSICAL THERAPY | Age: 71
Setting detail: THERAPIES SERIES
Discharge: HOME | End: 2021-12-17
Payer: MEDICARE

## 2021-12-17 PROCEDURE — 97140 MANUAL THERAPY 1/> REGIONS: CPT

## 2021-12-17 PROCEDURE — 97112 NEUROMUSCULAR REEDUCATION: CPT

## 2021-12-17 PROCEDURE — 97110 THERAPEUTIC EXERCISES: CPT

## 2021-12-17 NOTE — FLOWSHEET NOTE
Shaw Chamorro  Phone: [phone]   Fax: [phone]    Physical Therapy Treatment Note/ Progress Report:     Date:  2021    Patient Name:  Melani Escobar    :  1950  MRN: [id number]  Restrictions/Precautions:    Medical/Treatment Diagnosis Information:  · Diagnosis: M54.9 (ICD-10-CM) - Back pain, unspecified back location, unspecified back pain laterality, unspecified chronicity  · Treatment Diagnosis: impaired lumbosacral alignment, lumbar hypomobility, soft tissue decreased flexibility, decreased core/LE strength   Insurance/Certification information:  PT Insurance Information: [place] DN not covered  Physician Information:  Referring Practitioner: Lindy Joaquin MD  Plan of care signed (Y/N): [x]  Yes []  No    Date of Patient follow up with Physician: 21 to Reina Santana     Progress Report: []  Yes  [x]  No     Date Range for reporting period:  Beginnin21  PN: 21   Ending:     Progress report due (10 Rx/or 30 days whichever is less): visit #19 or 69 (date)     Recertification due (POC duration/ or 90 days whichever is less): visit #16 or 21 (8 weeks)     Visit # Insurance Allowable Auth required? Date Range   13 [place] []  Yes  [x]  No PCY     Latex Allergy:  [x]NO      []YES  Preferred Language for Healthcare:   [x]English       []other:    Functional Scale:       Date assessed:  Oswestry: raw score = 9; dysfunction = 18%  21  Oswestry: raw score = 8; dysfunction = 16%  21    Pain level:  2/10     SUBJECTIVE:  Pt reports her back was feeling pretty bad yesterday, doesn't know if it was because of the rainy weather. Pt states her pain is better today.                                                                                                                                 OBJECTIVE:   12/3: no significant changes since eval on objective measures   (-) prone propping to reduce pain  (-) Slump test    RESTRICTIONS/PRECAUTIONS: n/a     Treatment based classification: hypomobility     Comparable sign: pain with end ranges     ROM   Comments   Lumbar Flex 75%     Lumbar Ext 50%        ROM LEFT RIGHT Comments   Lumbar Side Bend 50% 25% Pain to R, pain on R when bending L   Lumbar Rotation 50% 50%  pain to R   Hip Flexion 100 100     Hip Abd         Hip ER 45 45     Hip IR 15 10          Strength / Myotomes LEFT RIGHT Comments   Hip Flexors (L1-2) 4- 4-     Hip Abductors 3+ 3+     Hip Extensors         Hip Internal Rotators  4 4-  pain on R   Hip External Rotators 3+ 3+ Pain on R   Quads (L2-4) 5- 5-     Hamstrings  5- 5-        Exercises/Interventions:     Therapeutic Exercise (20774) Resistance / level Sets / Seconds Reps Notes / Cues   Nustep  4'    IB calf stretch  HSS on step  30\"  30\" 2  2 B    LTR     ITB stretch w/ SOS     SKTC Stretch     Seated lumbar flex / side bend  15\" 4    Quad [place] back lumbar stretch     Hip hikes 4\" 11/3 educated to perform in front of bathroom mirror at home for visual feedback   Lateral band walks  Monster Walks    Sidelying hip IR (reverse clams)  Sidelying hip ER (clams) 11/23 - difficult to perform reverse even without resistance this date    Bridge w/ Ball squeeze            Prone press up    11/17 no change in pain, added to HEP                 Therapeutic Activities (88127)       PN completion and patient Education, POC updates, ortho referral, answered questions       Supine <> sit transfers   11/17: cues for technique                 Neuromuscular Re-ed (14422)       Deadbug ISOs     Seated on SB w/ pelvic mobility A/P, R/L, CW/CC 1 15 each    Quadruped UE reach  Quadruped LE knee lift to level pelvis with opp knee on airex      Multifidi walk outs w/ paloff press 15# Added 11/8   Cat/Camel  Focused on lumbar / pelvic mobility    Standing 3 way hip standing on airex Added 11/29   Supine TA contraction     Supine TA w/ March     Supine TA w/ SL BKFO no TB Supine TA w/ BKFO TB lime    Bridge w/ TA and TB lime    Standing stir the pot w/ TA lime    SB:  - march  - LAQ  - ball lifts and rotations    4'  2'  2' ea  Added 12/17                               Manual Intervention (60869)      Prone PA      GISTM/STM to R lateral quad and HS     Lumbar Manip       SI Manip       Hip belt mobs     Hip LA distraction  4'     Hip ROM / Stretching HS/ITB  2'     LTR - gentle OP       Ball/belt traction  30\" on: 10\" off 8'      Modalities:     Pt. Education:  -pt educated on diagnosis, prognosis and expectations for rehab  -all pt questions were answered    Home Exercise Prorgam:   Access Code: [email or web address]  URL: [email or web address]  Date: 11/01/2021  Prepared by: Samara Obrien     Exercises  Seated Flexion Stretch with Swiss Ball - 1 x daily - 7 x weekly - 3 sets - 10 reps  Seated Thoracic Flexion and Rotation with Swiss Ball - 1 x daily - 7 x weekly - 3 sets - 10 reps  Supine ITB Stretch with Strap - 1 x daily - 7 x weekly - 3 sets - 10 reps  Hooklying Single Knee to Chest Stretch - 1 x daily - 7 x weekly - 3 sets - 10 reps  Supine Lower Trunk Rotation - 1 x daily - 7 x weekly - 3 sets - 10 reps  Clamshell with Resistance - 1 x daily - 7 x weekly - 2 sets - 10 reps - 5 hold  Sidelying Reverse Clamshell with Resistance - 1 x daily - 7 x weekly - 2 sets - 10 reps - 5 hold  Hip Hiking on Step - 1 x daily - 7 x weekly - 2 sets - 10 reps  Side Stepping with Resistance at Ankles - 1 x daily - 7 x weekly - 3 sets - 10 reps      Therapeutic Exercise and NMR EXR  [x] (49783) Provided verbal/tactile cueing for activities related to strengthening, flexibility, endurance, ROM  for improvements in proximal hip and core control with self care, mobility, lifting and ambulation.  [] (94556) Provided verbal/tactile cueing for activities related to improving balance, coordination, kinesthetic sense, posture, motor skill, proprioception  to assist with core control in self care, mobility, lifting, and ambulation.   [] (54381) Therapist is in constant attendance of 2 or more patients providing skilled therapy interventions, but not providing any significant amount of measurable one-on-one time to either patient, for improvements in LE, proximal hip, and core control in self care, mobility, lifting, ambulation and eccentric single leg control. Therapeutic Activities:    [x] (57935 or 94100) Provided verbal/tactile cueing for activities related to improving balance, coordination, kinesthetic sense, posture, motor skill, proprioception and motor activation to allow for proper function  with self care and ADLs  [] (38440) Provided training and instruction to the patient for proper core and proximal hip recruitment and positioning with ambulation re-education     Home Exercise Program:    [x] (54183) Reviewed/Progressed HEP activities related to strengthening, flexibility, endurance, ROM of core, proximal hip and LE for functional self-care, mobility, lifting and ambulation   [] (55948) Reviewed/Progressed HEP activities related to improving balance, coordination, kinesthetic sense, posture, motor skill, proprioception of core, proximal hip and LE for self care, mobility, lifting, and ambulation      Manual Treatments:  PROM / STM / Oscillations-Mobs:  G-I, II, III, IV (PA's, Inf., Post.)  [] (97401) Provided manual therapy to mobilize proximal hip and LS spine soft tissue/joints for the purpose of modulating pain, promoting relaxation,  increasing ROM, reducing/eliminating soft tissue swelling/inflammation/restriction, improving soft tissue extensibility and allowing for proper ROM for normal function with self care, mobility, lifting and ambulation.        Charges:  Timed Code Treatment Minutes: 45   Total Treatment Minutes: 45       [] EVAL - LOW (48505)   [] EVAL - MOD (38291)  [] EVAL - HIGH (89004)  [] RE-EVAL (04437)  [x] LV(33319) x 1      [] Ionto  [x] NMR (03515) x 1      [] Vaso  [x] Manual ([id number]) x 1    [] Ultrasound  [] TA x 1        [] Mech Traction (47194)  [] Aquatic Therapy x     [] ES (un) (99165):   [] Home Management Training x  [] ES(attended) (64986)   [] Dry Needling 1-2 muscles (58064):  [] Dry Needling 3+ muscles (581040  [] Group:      [] Other:     Goals:   Patient stated goal: reduce pain   [x]? Progressing: []? Met: []? Not Met: []? Adjusted     Therapist goals for Patient:   Short Term Goals: To be achieved in: 2 weeks  1. Independent in HEP and progression per patient tolerance, in order to prevent re-injury. []? Progressing: [x]? Met: []? Not Met: []? Adjusted  2. Patient will have a decrease in pain to facilitate improvement in movement, function, and ADLs as indicated by Functional Deficits. [x]? Progressing: []? Met: []? Not Met: []? Adjusted     Long Term Goals: To be achieved in: 8 weeks  1. Disability index score of 10% or less for the GONZALO to assist with reaching prior level of function. [x]? Progressing: []? Met: []? Not Met: []? Adjusted  2. Patient will demonstrate increased AROM to WNL, good LS mobility, good hip ROM to allow for proper joint functioning as indicated by patients Functional Deficits. [x]? Progressing: []? Met: []? Not Met: []? Adjusted  3. Patient will demonstrate an increase in B LE Strength  > 4/5 with good proximal hip and core activation to allow for proper functional mobility as indicated by patients Functional Deficits. [x]? Progressing: []? Met: []? Not Met: []? Adjusted  4. Patient will return to functional activities including walking > 1 mile or sitting > 1 hour without increased symptoms or restriction. [x]? Progressing: []? Met: []? Not Met: []? Adjusted  5. Patient will be able to perform household activities such as cooking a meal without increased symptoms or restriction. [x]? Progressing: []? Met: []? Not Met: []?  Adjusted      Overall Progression Towards Functional goals/ Treatment Progress Update:  [] Patient is progressing as expected towards functional goals listed. [] Progression is slowed due to complexities/Impairments listed. [x] Progression has been slowed due to co-morbidities. [] Plan just implemented, too soon to assess goals progression <30days   [] Goals require adjustment due to lack of progress  [] Patient is not progressing as expected and requires additional follow up with physician  [] Other    Persisting Functional Limitations/Impairments:  [x]Sitting [x]Standing   [x]Walking []Squatting/bending    []Stairs []ADL's    []Transfers []Reaching  [x]Housework []Job related tasks  []Driving []Sports/Recreation   []Sleeping []Other:    ASSESSMENT:  Added SB core stabilization exercises this date to further challenge pt's postural awareness and core control in upright positioning. Pt tolerated well, denied pain exacerbation during treatment. Continued ball/belt traction as pt reported she tolerates this better than hip/belt mobs. Pt continues to report dec pain with TrA in functional positions and dec pain after stretching of R hip in IR and ER planes. Continue to progress core control, provide education on standing posture to reduce pain while cooking in kitchen, and strengthening of B LE's to improve standing and walking tolerance. Treatment/Activity Tolerance:  [x] Patient able to complete tx  [] Patient limited by fatique  [] Patient limited by pain  [] Patient limited by other medical complications  [] Other:     Prognosis: [x] Good [x] Fair  [] Poor    Patient Requires Follow-up: [x] Yes  [] No    PLAN: See eval. PT 1-2x / week for 6-8 weeks. [x] Continue per plan of care [] Alter current plan (see comments)  [] Plan of care initiated [] Hold pending MD visit [] Discharge    Electronically signed by: Elisabet Christian, PT, DPT      Note: If patient does not return for scheduled/ recommended follow up visits, this note will serve as a discharge from care along with most recent update on progress.

## 2021-12-21 ENCOUNTER — HOSPITAL ENCOUNTER (OUTPATIENT)
Dept: PHYSICAL THERAPY | Age: 71
Setting detail: THERAPIES SERIES
Discharge: HOME | End: 2021-12-21
Payer: MEDICARE

## 2021-12-21 PROCEDURE — 97140 MANUAL THERAPY 1/> REGIONS: CPT

## 2021-12-21 PROCEDURE — 97110 THERAPEUTIC EXERCISES: CPT

## 2021-12-21 PROCEDURE — 97112 NEUROMUSCULAR REEDUCATION: CPT

## 2021-12-21 NOTE — FLOWSHEET NOTE
Shaw Chamorro  Phone: [phone]   Fax: [phone]    Physical Therapy Treatment Note/ Progress Report:     Date:  2021    Patient Name:  Sha Jacome    :  1950  MRN: [id number]  Restrictions/Precautions:    Medical/Treatment Diagnosis Information:  · Diagnosis: M54.9 (ICD-10-CM) - Back pain, unspecified back location, unspecified back pain laterality, unspecified chronicity  · Treatment Diagnosis: impaired lumbosacral alignment, lumbar hypomobility, soft tissue decreased flexibility, decreased core/LE strength   Insurance/Certification information:  PT Insurance Information: [place] ([place] DN not covered  Physician Information:  Referring Practitioner: Josy Banda MD  Plan of care signed (Y/N): [x]  Yes []  No    Date of Patient follow up with Physician: 21 to Rick Peguero     Progress Report: []  Yes  [x]  No     Date Range for reporting period:  Beginnin21  PN: 21   Ending:     Progress report due (10 Rx/or 30 days whichever is less): visit #19 or 47 (date)     Recertification due (POC duration/ or 90 days whichever is less): visit #16 or 21 (8 weeks)     Visit # Insurance Allowable Auth required? Date Range   14 [place] []  Yes  [x]  No PCY     Latex Allergy:  [x]NO      []YES  Preferred Language for Healthcare:   [x]English       []other:    Functional Scale:       Date assessed:  Oswestry: raw score = 9; dysfunction = 18%  21  Oswestry: raw score = 8; dysfunction = 16%  21    Pain level:  2/10     SUBJECTIVE:  Pt reports back continues to have low pain but is alright.                                                                                                                                 OBJECTIVE:   12/3: no significant changes since eval on objective measures   (-) prone propping to reduce pain  (-) Slump test    RESTRICTIONS/PRECAUTIONS: n/a     Treatment based classification: hypomobility     Comparable sign: pain with end ranges     ROM   Comments   Lumbar Flex 75%     Lumbar Ext 50%        ROM LEFT RIGHT Comments   Lumbar Side Bend 50% 25% Pain to R, pain on R when bending L   Lumbar Rotation 50% 50%  pain to R   Hip Flexion 100 100     Hip Abd         Hip ER 45 45     Hip IR 15 10          Strength / Myotomes LEFT RIGHT Comments   Hip Flexors (L1-2) 4- 4-     Hip Abductors 3+ 3+     Hip Extensors         Hip Internal Rotators  4 4-  pain on R   Hip External Rotators 3+ 3+ Pain on R   Quads (L2-4) 5- 5-     Hamstrings  5- 5-        Exercises/Interventions:     Therapeutic Exercise (92796) Resistance / level Sets / Seconds Reps Notes / Cues   Nustep  4'    IB calf stretch  HSS on step  30\"  30\" 2  2 B    LTR     ITB stretch w/ SOS     SKTC Stretch     Seated lumbar flex / side bend  15\" 4    [place]ad [place] back lumbar stretch     Hip hikes 4\" 11/3 educated to perform in front of bathroom mirror at home for visual feedback   Lateral band walks  Monster Walks    Sidelying hip IR (reverse clams)  Sidelying hip ER (clams) 11/23 - difficult to perform reverse even without resistance this date    Bridge w/ Ball squeeze            Prone press up    11/17 no change in pain, added to HEP                 Therapeutic Activities (09992)       PN completion and patient Education, POC updates, ortho referral, answered questions       Supine <> sit transfers   11/17: cues for technique                 Neuromuscular Re-ed (19689)       Tamy Patches  5\" 15    Seated on SB w/ pelvic mobility A/P, R/L, CW/CC 1 15 each    Quadruped UE reach  Quadruped LE knee lift to level pelvis with opp knee on airex      Multifidi walk outs w/ paloff press 15# Added 11/8   Cat/Camel  Focused on lumbar / pelvic mobility    Standing 3 way hip standing on airex Added 11/29   Supine TA contraction     Supine TA w/ March     Supine TA w/ SL BKFO no TB     Supine TA w/ BKFO TB lime    Bridge w/ TA and TB lime    Standing stir the pot w/ TA lime    SB:  - march  - LAQ  - ball lifts and rotations    4'  2'  2' ea  Added 12/17   Deadbug w/ contralateral LE/UE reaching  2 3 each    Bridge w/ TA and glute squeeze  1 15                  Manual Intervention (22070)      Prone PA      GISTM/STM to R lateral quad and HS     STM R gluteal musculature, QL, multifidus  6'  Very tender to palpation    Lumbar Gapping  10'  At red table w/ auto wedging    SI Manip       Hip belt mobs     Hip LA distraction      Hip ROM / Stretching HS/ITB      LTR - gentle OP       Ball/belt traction       Modalities:     Pt. Education:  -pt educated on diagnosis, prognosis and expectations for rehab  -all pt questions were answered    Home Exercise Prorgam:   Access Code: [email or web address]  URL: [email or web address]  Date: 11/01/2021  Prepared by: Etienne Flax     Exercises  Seated Flexion Stretch with Swiss Ball - 1 x daily - 7 x weekly - 3 sets - 10 reps  Seated Thoracic Flexion and Rotation with Swiss Ball - 1 x daily - 7 x weekly - 3 sets - 10 reps  Supine ITB Stretch with Strap - 1 x daily - 7 x weekly - 3 sets - 10 reps  Hooklying Single Knee to Chest Stretch - 1 x daily - 7 x weekly - 3 sets - 10 reps  Supine Lower Trunk Rotation - 1 x daily - 7 x weekly - 3 sets - 10 reps  Clamshell with Resistance - 1 x daily - 7 x weekly - 2 sets - 10 reps - 5 hold  Sidelying Reverse Clamshell with Resistance - 1 x daily - 7 x weekly - 2 sets - 10 reps - 5 hold  Hip Hiking on Step - 1 x daily - 7 x weekly - 2 sets - 10 reps  Side Stepping with Resistance at Ankles - 1 x daily - 7 x weekly - 3 sets - 10 reps      Therapeutic Exercise and NMR EXR  [x] (13225) Provided verbal/tactile cueing for activities related to strengthening, flexibility, endurance, ROM  for improvements in proximal hip and core control with self care, mobility, lifting and ambulation.  [] (29474) Provided verbal/tactile cueing for activities related to improving balance, coordination, kinesthetic sense, posture, motor skill, proprioception  to assist with core control in self care, mobility, lifting, and ambulation.   [] (86182) Therapist is in constant attendance of 2 or more patients providing skilled therapy interventions, but not providing any significant amount of measurable one-on-one time to either patient, for improvements in LE, proximal hip, and core control in self care, mobility, lifting, ambulation and eccentric single leg control. Therapeutic Activities:    [x] (85854 or 64523) Provided verbal/tactile cueing for activities related to improving balance, coordination, kinesthetic sense, posture, motor skill, proprioception and motor activation to allow for proper function  with self care and ADLs  [] (08944) Provided training and instruction to the patient for proper core and proximal hip recruitment and positioning with ambulation re-education     Home Exercise Program:    [x] (74961) Reviewed/Progressed HEP activities related to strengthening, flexibility, endurance, ROM of core, proximal hip and LE for functional self-care, mobility, lifting and ambulation   [] (64647) Reviewed/Progressed HEP activities related to improving balance, coordination, kinesthetic sense, posture, motor skill, proprioception of core, proximal hip and LE for self care, mobility, lifting, and ambulation      Manual Treatments:  PROM / STM / Oscillations-Mobs:  G-I, II, III, IV (PA's, Inf., Post.)  [] (51733) Provided manual therapy to mobilize proximal hip and LS spine soft tissue/joints for the purpose of modulating pain, promoting relaxation,  increasing ROM, reducing/eliminating soft tissue swelling/inflammation/restriction, improving soft tissue extensibility and allowing for proper ROM for normal function with self care, mobility, lifting and ambulation.        Charges:  Timed Code Treatment Minutes: 45   Total Treatment Minutes: 45       [] EVAL - LOW (78512)   [] EVAL - MOD (59173)  [] EVAL - HIGH (25692)  [] RE-EVAL (00375)  [x] ST(66946) x 1      [] Ionto  [x] NMR (62477) x 1      [] Vaso  [x] Manual (55458) x 1    [] Ultrasound  [] TA x 1        [] Mech Traction (38273)  [] Aquatic Therapy x     [] ES (un) (94287):   [] Home Management Training x  [] ES(attended) (40585)   [] Dry Needling 1-2 muscles (63741):  [] Dry Needling 3+ muscles (003961  [] Group:      [] Other:     Goals:   Patient stated goal: reduce pain   [x]? Progressing: []? Met: []? Not Met: []? Adjusted     Therapist goals for Patient:   Short Term Goals: To be achieved in: 2 weeks  1. Independent in HEP and progression per patient tolerance, in order to prevent re-injury. []? Progressing: [x]? Met: []? Not Met: []? Adjusted  2. Patient will have a decrease in pain to facilitate improvement in movement, function, and ADLs as indicated by Functional Deficits. [x]? Progressing: []? Met: []? Not Met: []? Adjusted     Long Term Goals: To be achieved in: 8 weeks  1. Disability index score of 10% or less for the GONZALO to assist with reaching prior level of function. [x]? Progressing: []? Met: []? Not Met: []? Adjusted  2. Patient will demonstrate increased AROM to WNL, good LS mobility, good hip ROM to allow for proper joint functioning as indicated by patients Functional Deficits. [x]? Progressing: []? Met: []? Not Met: []? Adjusted  3. Patient will demonstrate an increase in B LE Strength  > 4/5 with good proximal hip and core activation to allow for proper functional mobility as indicated by patients Functional Deficits. [x]? Progressing: []? Met: []? Not Met: []? Adjusted  4. Patient will return to functional activities including walking > 1 mile or sitting > 1 hour without increased symptoms or restriction. [x]? Progressing: []? Met: []? Not Met: []? Adjusted  5. Patient will be able to perform household activities such as cooking a meal without increased symptoms or restriction. [x]? Progressing: []? Met: []? Not Met: []?  Adjusted Overall Progression Towards Functional goals/ Treatment Progress Update:  [] Patient is progressing as expected towards functional goals listed. [] Progression is slowed due to complexities/Impairments listed. [x] Progression has been slowed due to co-morbidities. [] Plan just implemented, too soon to assess goals progression <30days   [] Goals require adjustment due to lack of progress  [] Patient is not progressing as expected and requires additional follow up with physician  [] Other    Persisting Functional Limitations/Impairments:  [x]Sitting [x]Standing   [x]Walking []Squatting/bending    []Stairs []ADL's    []Transfers []Reaching  [x]Housework []Job related tasks  []Driving []Sports/Recreation   []Sleeping []Other:    ASSESSMENT:  Continued and progressed SB core stabilization exercises this date to further challenge pt's postural awareness and core control in upright positioning. Pt tolerated well, denied pain exacerbation during treatment. Initiated lumbar gapping and posterior STM per patient's report of pain with R Sidebending. She has improved ROM and decreased pain following manual intervention. reasses pain levels at next session. Pt continues to report dec pain with TrA in functional positions and dec pain after stretching of R hip in IR and ER planes. Continue to progress core control, provide education on standing posture to reduce pain while cooking in kitchen, and strengthening of B LE's to improve standing and walking tolerance. Treatment/Activity Tolerance:  [x] Patient able to complete tx  [] Patient limited by fatique  [] Patient limited by pain  [] Patient limited by other medical complications  [] Other:     Prognosis: [x] Good [x] Fair  [] Poor    Patient Requires Follow-up: [x] Yes  [] No    PLAN: See eval. PT 1-2x / week for 6-8 weeks.    [x] Continue per plan of care [] Alter current plan (see comments)  [] Plan of care initiated [] Hold pending MD visit [] Discharge    Electronically signed by: Alyssa Ventura, PT, DPT      Note: If patient does not return for scheduled/ recommended follow up visits, this note will serve as a discharge from care along with most recent update on progress.

## 2021-12-23 ENCOUNTER — HOSPITAL ENCOUNTER (OUTPATIENT)
Dept: PHYSICAL THERAPY | Age: 71
Setting detail: THERAPIES SERIES
Discharge: HOME | End: 2021-12-23
Payer: MEDICARE

## 2021-12-23 PROCEDURE — 97112 NEUROMUSCULAR REEDUCATION: CPT

## 2021-12-23 PROCEDURE — 97110 THERAPEUTIC EXERCISES: CPT

## 2021-12-23 NOTE — FLOWSHEET NOTE
Shaw Sigala  Phone: [phone]   Fax: [phone]    Physical Therapy Treatment Note/ Progress Report:     Date:  2021    Patient Name:  Nelson Gil    :  1950  MRN: [id number]  Restrictions/Precautions:    Medical/Treatment Diagnosis Information:  · Diagnosis: M54.9 (ICD-10-CM) - Back pain, unspecified back location, unspecified back pain laterality, unspecified chronicity  · Treatment Diagnosis: impaired lumbosacral alignment, lumbar hypomobility, soft tissue decreased flexibility, decreased core/LE strength   Insurance/Certification information:  PT Insurance Information: [place] ([place]) DN not covered  Physician Information:  Referring Practitioner: Lou Ramsay MD  Plan of care signed (Y/N): [x]  Yes []  No    Date of Patient follow up with Physician: 21 to Cathy Evans     Progress Report: []  Yes  [x]  No     Date Range for reporting period:  Beginnin21  PN: 21   Ending:     Progress report due (10 Rx/or 30 days whichever is less): visit #19 or 88 (date)     Recertification due (POC duration/ or 90 days whichever is less): visit #16 or 21 (8 weeks)     Visit # Insurance Allowable Auth required? Date Range   15 [place] []  Yes  [x]  No PCY     Latex Allergy:  [x]NO      []YES  Preferred Language for Healthcare:   [x]English       []other:    Functional Scale:       Date assessed:  Oswestry: raw score = 9; dysfunction = 18%  21  Oswestry: raw score = 8; dysfunction = 16%  21    Pain level:  2/10     SUBJECTIVE:  Pt reports back continues to have low pain but is alright. She had some relief from last session but its already starting to come back.  Manual interventions help day of but don't tend to last.                                                                                                                                 OBJECTIVE:   12/3: no significant changes since eval on objective measures   (-) prone propping to reduce pain  (-) Slump test    RESTRICTIONS/PRECAUTIONS: n/a     Treatment based classification: hypomobility     Comparable sign: pain with end ranges     ROM   Comments   Lumbar Flex 75%     Lumbar Ext 50%        ROM LEFT RIGHT Comments   Lumbar Side Bend 50% 25% Pain to R, pain on R when bending L   Lumbar Rotation 50% 50%  pain to R   Hip Flexion 100 100     Hip Abd         Hip ER 45 45     Hip IR 15 10          Strength / Myotomes LEFT RIGHT Comments   Hip Flexors (L1-2) 4- 4-     Hip Abductors 3+ 3+     Hip Extensors         Hip Internal Rotators  4 4-  pain on R   Hip External Rotators 3+ 3+ Pain on R   Quads (L2-4) 5- 5-     Hamstrings  5- 5-        Exercises/Interventions:     Therapeutic Exercise (58403) Resistance / level Sets / Seconds Reps Notes / Cues   Nustep  4'    IB calf stretch  HSS on step  30\"  30\" 2  2 B    LTR  10\" 3    ITB stretch w/ SOS     SKTC Stretch     Seated lumbar flex / side bend  15\" 4    [place] back lumbar stretch     Hip hikes 4\" 11/3 educated to perform in front of bathroom mirror at home for visual feedback   Lateral band walks  Monster Walks lime 15'  x4    Sidelying hip IR (reverse clams)  Sidelying hip ER (clams) 11/23 - difficult to perform reverse even without resistance this date    Bridge w/ Ball squeeze  3\" hold 20           Prone press up    11/17 no change in pain, added to HEP                 Therapeutic Activities (46520)       PN completion and patient Education, POC updates, ortho referral, answered questions       Supine <> sit transfers   11/17: cues for technique   Squats  1 15           Neuromuscular Re-ed (43655)       Shelli Cayuga  5\" 15    Seated on SB w/ pelvic mobility A/P, R/L, CW/CC 1 15 each    Quadruped UE reach  Quadruped LE knee lift to level pelvis with opp knee on airex      Multifidi walk outs w/ paloff press 15# Added 11/8   Cat/Camel  Focused on lumbar / pelvic mobility    Standing 3 way hip standing on airex Added 11/29   Supine TA contraction  5\" 15    Supine TA w/ March  2 10    Supine TA w/ SL BKFO no TB     Supine TA w/ BKFO TB lime 2 10    Bridge w/ TA and TB lime 2 10    Standing stir the pot w/ TA lime 2 10 each    SB:  - march  - LAQ  - ball lifts and rotations    4'  2'  2' ea  Added 12/17   Deadbug w/ contralateral LE/UE reaching  2 5 each    Bridge w/ TA and glute squeeze  2 10                  Manual Intervention (70608)      Prone PA      GISTM/STM to R lateral quad and HS     STM R gluteal musculature, QL, multifidus   Very tender to palpation    Lumbar Gapping   At red table w/ auto wedging    SI Manip      Hip belt mobs     Hip LA distraction      Hip ROM / Stretching HS/ITB      LTR - gentle OP       Ball/belt traction       Modalities:     Pt. Education:  -pt educated on diagnosis, prognosis and expectations for rehab  -all pt questions were answered    Home Exercise Prorgam:   Access Code: [email or web address]  URL: [email or web address]  Date: 11/01/2021  Prepared by: Funmi Groves     Exercises  Seated Flexion Stretch with Swiss Ball - 1 x daily - 7 x weekly - 3 sets - 10 reps  Seated Thoracic Flexion and Rotation with Swiss Ball - 1 x daily - 7 x weekly - 3 sets - 10 reps  Supine ITB Stretch with Strap - 1 x daily - 7 x weekly - 3 sets - 10 reps  Hooklying Single Knee to Chest Stretch - 1 x daily - 7 x weekly - 3 sets - 10 reps  Supine Lower Trunk Rotation - 1 x daily - 7 x weekly - 3 sets - 10 reps  Clamshell with Resistance - 1 x daily - 7 x weekly - 2 sets - 10 reps - 5 hold  Sidelying Reverse Clamshell with Resistance - 1 x daily - 7 x weekly - 2 sets - 10 reps - 5 hold  Hip Hiking on Step - 1 x daily - 7 x weekly - 2 sets - 10 reps  Side Stepping with Resistance at Ankles - 1 x daily - 7 x weekly - 3 sets - 10 reps      Therapeutic Exercise and NMR EXR  [x] (81383) Provided verbal/tactile cueing for activities related to strengthening, flexibility, endurance, ROM  for improvements in proximal hip and core control with self care, mobility, lifting and ambulation.  [] (47697) Provided verbal/tactile cueing for activities related to improving balance, coordination, kinesthetic sense, posture, motor skill, proprioception  to assist with core control in self care, mobility, lifting, and ambulation.   [] (61046) Therapist is in constant attendance of 2 or more patients providing skilled therapy interventions, but not providing any significant amount of measurable one-on-one time to either patient, for improvements in LE, proximal hip, and core control in self care, mobility, lifting, ambulation and eccentric single leg control.      Therapeutic Activities:    [x] (03059 or 51482) Provided verbal/tactile cueing for activities related to improving balance, coordination, kinesthetic sense, posture, motor skill, proprioception and motor activation to allow for proper function  with self care and ADLs  [] (80604) Provided training and instruction to the patient for proper core and proximal hip recruitment and positioning with ambulation re-education     Home Exercise Program:    [x] (70081) Reviewed/Progressed HEP activities related to strengthening, flexibility, endurance, ROM of core, proximal hip and LE for functional self-care, mobility, lifting and ambulation   [] (41883) Reviewed/Progressed HEP activities related to improving balance, coordination, kinesthetic sense, posture, motor skill, proprioception of core, proximal hip and LE for self care, mobility, lifting, and ambulation      Manual Treatments:  PROM / STM / Oscillations-Mobs:  G-I, II, III, IV (PA's, Inf., Post.)  [] (53395) Provided manual therapy to mobilize proximal hip and LS spine soft tissue/joints for the purpose of modulating pain, promoting relaxation,  increasing ROM, reducing/eliminating soft tissue swelling/inflammation/restriction, improving soft tissue extensibility and allowing for proper ROM for normal function with self care, mobility, lifting and ambulation. Charges:  Timed Code Treatment Minutes: 45   Total Treatment Minutes: 45       [] EVAL - LOW (53299)   [] EVAL - MOD (69834)  [] EVAL - HIGH (06043)  [] RE-EVAL (05162)  [x] RA(24944) x 1      [] Ionto  [x] NMR (21335) x 2      [] Vaso  [] Manual (84710) x 1    [] Ultrasound  [] TA x 1        [] Mech Traction (57407)  [] Aquatic Therapy x     [] ES (un) (54468):   [] Home Management Training x  [] ES(attended) (98559)   [] Dry Needling 1-2 muscles (05446):  [] Dry Needling 3+ muscles (766846  [] Group:      [] Other:     Goals:   Patient stated goal: reduce pain   [x]? Progressing: []? Met: []? Not Met: []? Adjusted     Therapist goals for Patient:   Short Term Goals: To be achieved in: 2 weeks  1. Independent in HEP and progression per patient tolerance, in order to prevent re-injury. []? Progressing: [x]? Met: []? Not Met: []? Adjusted  2. Patient will have a decrease in pain to facilitate improvement in movement, function, and ADLs as indicated by Functional Deficits. [x]? Progressing: []? Met: []? Not Met: []? Adjusted     Long Term Goals: To be achieved in: 8 weeks  1. Disability index score of 10% or less for the GONZALO to assist with reaching prior level of function. [x]? Progressing: []? Met: []? Not Met: []? Adjusted  2. Patient will demonstrate increased AROM to WNL, good LS mobility, good hip ROM to allow for proper joint functioning as indicated by patients Functional Deficits. [x]? Progressing: []? Met: []? Not Met: []? Adjusted  3. Patient will demonstrate an increase in B LE Strength  > 4/5 with good proximal hip and core activation to allow for proper functional mobility as indicated by patients Functional Deficits. [x]? Progressing: []? Met: []? Not Met: []? Adjusted  4. Patient will return to functional activities including walking > 1 mile or sitting > 1 hour without increased symptoms or restriction. [x]? Progressing: []? Met: []? Not Met: []? Adjusted  5. Patient will be able to perform household activities such as cooking a meal without increased symptoms or restriction. [x]? Progressing: []? Met: []? Not Met: []? Adjusted      Overall Progression Towards Functional goals/ Treatment Progress Update:  [] Patient is progressing as expected towards functional goals listed. [] Progression is slowed due to complexities/Impairments listed. [x] Progression has been slowed due to co-morbidities. [] Plan just implemented, too soon to assess goals progression <30days   [] Goals require adjustment due to lack of progress  [] Patient is not progressing as expected and requires additional follow up with physician  [] Other    Persisting Functional Limitations/Impairments:  [x]Sitting [x]Standing   [x]Walking []Squatting/bending    []Stairs []ADL's    []Transfers []Reaching  [x]Housework []Job related tasks  []Driving []Sports/Recreation   []Sleeping []Other:    ASSESSMENT:  Manual held this date as interventions have not significantly changed patient's pain. Focused on HEP in order to have pain maintenance and know how to decrease pain upon exacerbation. Continued SB core stabilization exercises this date to further challenge pt's postural awareness and core control in upright positioning. Pt tolerated well, denied pain exacerbation during treatment. Pt continues to report dec pain with TrA in functional positions and dec pain after stretching of R hip in IR and ER planes. Continue to progress core control, provide education on standing posture to reduce pain while cooking in kitchen, and strengthening of B LE's to improve standing and walking tolerance. Treatment/Activity Tolerance:  [x] Patient able to complete tx  [] Patient limited by fatique  [] Patient limited by pain  [] Patient limited by other medical complications  [] Other:     Prognosis: [x] Good [x] Fair  [] Poor    Patient Requires Follow-up: [x] Yes  [] No    PLAN: See eval. PT 1-2x / week for 6-8 weeks. [x] Continue per plan of care [] Alter current plan (see comments)  [] Plan of care initiated [] Hold pending MD visit [] Discharge    Electronically signed by: Nazanin Rao, PT, DPT      Note: If patient does not return for scheduled/ recommended follow up visits, this note will serve as a discharge from care along with most recent update on progress.

## 2021-12-29 ENCOUNTER — APPOINTMENT (OUTPATIENT)
Dept: PHYSICAL THERAPY | Age: 71
End: 2021-12-29
Payer: MEDICARE

## 2022-01-10 DIAGNOSIS — I10 PRIMARY HYPERTENSION: ICD-10-CM

## 2022-01-10 LAB
ANION GAP SERPL CALCULATED.3IONS-SCNC: 13 MMOL/L (ref 3–16)
BUN BLDV-MCNC: 32 MG/DL (ref 7–20)
CALCIUM SERPL-MCNC: 9.4 MG/DL (ref 8.3–10.6)
CHLORIDE BLD-SCNC: 99 MMOL/L (ref 99–110)
CO2: 24 MMOL/L (ref 21–32)
CREAT SERPL-MCNC: 1.6 MG/DL (ref 0.6–1.2)
GFR AFRICAN AMERICAN: 38
GFR NON-AFRICAN AMERICAN: 32
GLUCOSE BLD-MCNC: 109 MG/DL (ref 70–99)
POTASSIUM SERPL-SCNC: 5 MMOL/L (ref 3.5–5.1)
SODIUM BLD-SCNC: 136 MMOL/L (ref 136–145)

## 2022-01-13 ENCOUNTER — OFFICE VISIT (OUTPATIENT)
Dept: INTERNAL MEDICINE CLINIC | Age: 72
End: 2022-01-13
Payer: MEDICARE

## 2022-01-13 VITALS
WEIGHT: 169 LBS | BODY MASS INDEX: 31.1 KG/M2 | HEIGHT: 62 IN | SYSTOLIC BLOOD PRESSURE: 132 MMHG | DIASTOLIC BLOOD PRESSURE: 70 MMHG

## 2022-01-13 DIAGNOSIS — E78.2 MIXED HYPERLIPIDEMIA: ICD-10-CM

## 2022-01-13 DIAGNOSIS — M67.432 GANGLION CYST OF WRIST, LEFT: ICD-10-CM

## 2022-01-13 DIAGNOSIS — M54.50 CHRONIC RIGHT-SIDED LOW BACK PAIN WITHOUT SCIATICA: Primary | ICD-10-CM

## 2022-01-13 DIAGNOSIS — I10 PRIMARY HYPERTENSION: ICD-10-CM

## 2022-01-13 DIAGNOSIS — G89.29 CHRONIC RIGHT-SIDED LOW BACK PAIN WITHOUT SCIATICA: Primary | ICD-10-CM

## 2022-01-13 DIAGNOSIS — R79.89 ELEVATED SERUM CREATININE: ICD-10-CM

## 2022-01-13 PROCEDURE — 1123F ACP DISCUSS/DSCN MKR DOCD: CPT | Performed by: INTERNAL MEDICINE

## 2022-01-13 PROCEDURE — G8427 DOCREV CUR MEDS BY ELIG CLIN: HCPCS | Performed by: INTERNAL MEDICINE

## 2022-01-13 PROCEDURE — 3017F COLORECTAL CA SCREEN DOC REV: CPT | Performed by: INTERNAL MEDICINE

## 2022-01-13 PROCEDURE — 4040F PNEUMOC VAC/ADMIN/RCVD: CPT | Performed by: INTERNAL MEDICINE

## 2022-01-13 PROCEDURE — G8399 PT W/DXA RESULTS DOCUMENT: HCPCS | Performed by: INTERNAL MEDICINE

## 2022-01-13 PROCEDURE — 1036F TOBACCO NON-USER: CPT | Performed by: INTERNAL MEDICINE

## 2022-01-13 PROCEDURE — G8417 CALC BMI ABV UP PARAM F/U: HCPCS | Performed by: INTERNAL MEDICINE

## 2022-01-13 PROCEDURE — 1090F PRES/ABSN URINE INCON ASSESS: CPT | Performed by: INTERNAL MEDICINE

## 2022-01-13 PROCEDURE — 99214 OFFICE O/P EST MOD 30 MIN: CPT | Performed by: INTERNAL MEDICINE

## 2022-01-13 PROCEDURE — G8484 FLU IMMUNIZE NO ADMIN: HCPCS | Performed by: INTERNAL MEDICINE

## 2022-01-13 RX ORDER — MELOXICAM 15 MG/1
TABLET ORAL
Qty: 90 TABLET | Refills: 1 | Status: SHIPPED | OUTPATIENT
Start: 2022-01-13

## 2022-01-13 NOTE — PROGRESS NOTES
breath sounds. Musculoskeletal:      Comments: Ganglion cyst on left wrist   Skin:     General: Skin is warm and dry. Neurological:      General: No focal deficit present. Mental Status: She is alert. Motor: No weakness. Gait: Gait normal.   Psychiatric:         Mood and Affect: Mood normal.         Behavior: Behavior normal.         Thought Content: Thought content normal.         Judgment: Judgment normal.                  An electronic signature was used to authenticate this note.     --Yvette Goodson MD

## 2022-01-14 ENCOUNTER — TELEPHONE (OUTPATIENT)
Dept: INTERNAL MEDICINE CLINIC | Age: 72
End: 2022-01-14

## 2022-01-14 NOTE — TELEPHONE ENCOUNTER
----- Message from [facility] sent at 1/14/2022  4:02 PM EST -----  Subject: Message to Provider    QUESTIONS  Information for Provider? Pt needs CT Cardiac Calcium Scoring and MRI   lumbar orders sent to [facility], fax #[phone]. Please call pt   when these are faxed to [facility].   ---------------------------------------------------------------------------  --------------  [address]  What is the best way for the office to contact you? OK to leave message on   voicemail  Preferred Call Back Phone Number? [phone]  ---------------------------------------------------------------------------  --------------  SCRIPT ANSWERS  Relationship to Patient?  Self

## 2022-01-17 NOTE — TELEPHONE ENCOUNTER
[email or web address] never received the orders. They got another number we can scan to. Please rescan orders to:  Fax: [phone]    Please call Rj Tucker and advise when sent - [phone].

## 2022-01-20 DIAGNOSIS — R79.89 ELEVATED SERUM CREATININE: ICD-10-CM

## 2022-01-20 LAB
ANION GAP SERPL CALCULATED.3IONS-SCNC: 14 MMOL/L (ref 3–16)
BUN BLDV-MCNC: 20 MG/DL (ref 7–20)
CALCIUM SERPL-MCNC: 9.4 MG/DL (ref 8.3–10.6)
CHLORIDE BLD-SCNC: 99 MMOL/L (ref 99–110)
CO2: 26 MMOL/L (ref 21–32)
CREAT SERPL-MCNC: 1 MG/DL (ref 0.6–1.2)
GFR AFRICAN AMERICAN: >60
GFR NON-AFRICAN AMERICAN: 55
GLUCOSE BLD-MCNC: 95 MG/DL (ref 70–99)
POTASSIUM SERPL-SCNC: 4.4 MMOL/L (ref 3.5–5.1)
SODIUM BLD-SCNC: 139 MMOL/L (ref 136–145)

## 2022-01-26 ENCOUNTER — TELEPHONE (OUTPATIENT)
Dept: INTERNAL MEDICINE CLINIC | Age: 72
End: 2022-01-26

## 2022-01-26 NOTE — TELEPHONE ENCOUNTER
Discussed MRI results - some foraminal stenosis, but nothing that I think is clearly causing her symptoms. She will continue to do exercises at home and see if she can get some improvement. CT cardiac calcium score was 2.1 in the 38th percentile. Could consider low dose statin, but not a high risk score. Will see how the cholesterol is doing in October, she will work on lifestyle interventions in the meantime, and if the cholesterol continues to rise then may start statin at that point.

## 2022-03-24 ENCOUNTER — TELEPHONE (OUTPATIENT)
Dept: INTERNAL MEDICINE CLINIC | Age: 72
End: 2022-03-24

## 2022-03-24 NOTE — TELEPHONE ENCOUNTER
----- Message from Gene Mcintyre sent at 3/24/2022  3:27 PM EDT -----  Subject: Appointment Request    Reason for Call: Routine Joint Pain    QUESTIONS  Type of Appointment? Established Patient  Reason for appointment request? No appointments available during search  Additional Information for Provider? PT having more pain in the hip & leg   areas. Takes Tylenol daily and pain is continuing to increase daily. PLEASE LEAVE DETAILED VOICEMAIL.  ---------------------------------------------------------------------------  --------------  CALL BACK INFO  What is the best way for the office to contact you? OK to leave message on   voicemail  Preferred Call Back Phone Number? [phone]  ---------------------------------------------------------------------------  --------------  SCRIPT ANSWERS  Relationship to Patient? Self  Did you have an injury or trauma within the past 3 days? No  Is your joint red or swollen? No  Are you having new onset numbness, tingling, or weakness with the pain? No  Did you have an injury or trauma within the past 14 days? No  Did your pain begin within the past week? No  Are you having fevers (100.4), chills, or sweats? No  (Is the patient requesting to be seen urgently for their symptoms?)? No  Have you recently (14 days) seen a provider for this issue? No  Have you been diagnosed with, awaiting test results for, or told that you   are suspected of having COVID-19 (Coronavirus)? (If patient has tested   negative or was tested as a requirement for work, school, or travel and   not based on symptoms, answer no)? No  Within the past 10 days have you developed any of the following symptoms   (answer no if symptoms have been present longer than 10 days or began   more than 10 days ago)?  Fever or Chills, Cough, Shortness of breath or   difficulty breathing, Loss of taste or smell, Sore throat, Nasal   congestion, Sneezing or runny nose, Fatigue or generalized body aches   (answer no if pain is specific to a body part e.g. back pain), Diarrhea,   Headache? No  Have you had close contact with someone with COVID-19 in the last 7 days? No  (Service Expert  click yes below to proceed with [employer] As Usual   Scheduling)?  Yes

## 2022-05-09 ENCOUNTER — TELEPHONE (OUTPATIENT)
Dept: INTERNAL MEDICINE CLINIC | Age: 72
End: 2022-05-09

## 2022-05-10 ENCOUNTER — OFFICE VISIT (OUTPATIENT)
Dept: INTERNAL MEDICINE CLINIC | Age: 72
End: 2022-05-10
Payer: MEDICARE

## 2022-05-10 VITALS
DIASTOLIC BLOOD PRESSURE: 70 MMHG | HEIGHT: 62 IN | SYSTOLIC BLOOD PRESSURE: 130 MMHG | WEIGHT: 163 LBS | BODY MASS INDEX: 30 KG/M2

## 2022-05-10 DIAGNOSIS — R00.2 PALPITATION: Primary | ICD-10-CM

## 2022-05-10 PROCEDURE — G8417 CALC BMI ABV UP PARAM F/U: HCPCS | Performed by: INTERNAL MEDICINE

## 2022-05-10 PROCEDURE — 4040F PNEUMOC VAC/ADMIN/RCVD: CPT | Performed by: INTERNAL MEDICINE

## 2022-05-10 PROCEDURE — 1090F PRES/ABSN URINE INCON ASSESS: CPT | Performed by: INTERNAL MEDICINE

## 2022-05-10 PROCEDURE — 99214 OFFICE O/P EST MOD 30 MIN: CPT | Performed by: INTERNAL MEDICINE

## 2022-05-10 PROCEDURE — 93000 ELECTROCARDIOGRAM COMPLETE: CPT | Performed by: INTERNAL MEDICINE

## 2022-05-10 PROCEDURE — 1036F TOBACCO NON-USER: CPT | Performed by: INTERNAL MEDICINE

## 2022-05-10 PROCEDURE — G8427 DOCREV CUR MEDS BY ELIG CLIN: HCPCS | Performed by: INTERNAL MEDICINE

## 2022-05-10 PROCEDURE — 1123F ACP DISCUSS/DSCN MKR DOCD: CPT | Performed by: INTERNAL MEDICINE

## 2022-05-10 PROCEDURE — G8399 PT W/DXA RESULTS DOCUMENT: HCPCS | Performed by: INTERNAL MEDICINE

## 2022-05-10 PROCEDURE — 3017F COLORECTAL CA SCREEN DOC REV: CPT | Performed by: INTERNAL MEDICINE

## 2022-05-10 ASSESSMENT — PATIENT HEALTH QUESTIONNAIRE - PHQ9
SUM OF ALL RESPONSES TO PHQ QUESTIONS 1-9: 0
SUM OF ALL RESPONSES TO PHQ9 QUESTIONS 1 & 2: 0
2. FEELING DOWN, DEPRESSED OR HOPELESS: 0
1. LITTLE INTEREST OR PLEASURE IN DOING THINGS: 0
SUM OF ALL RESPONSES TO PHQ QUESTIONS 1-9: 0

## 2022-05-10 NOTE — PROGRESS NOTES
Claudette Vega (:  1950) is a 70 y.o. female,Established patient, here for evaluation of the following chief complaint(s):  Heart Problem (heart flutter and palpitations, feels like always has to take a deep breath and feels a hallow feeling in chest, ongoign 2 weeks, no know triggers. )         ASSESSMENT/PLAN:  1. Palpitation  -EKG was done in clinic, showed PACs. Having frequent symptoms, needs Holter monitor. Check labs to rule out electrolyte abnormalities, thyroid dysfunction. -     EKG 12 Lead  -     Basic Metabolic Panel; Future  -     TSH with Reflex; Future  -     Holter Monitor 24 Hour; Future  -     Magnesium; Future  -     ECHO Complete 2D W Doppler W Color; Future      Return if symptoms worsen or fail to improve. Subjective   SUBJECTIVE/OBJECTIVE:  HPI    She has been feeling intermittent heart fluttering and palpitations for the last 2 weeks. She has not noted any clear triggers for the symptoms. She feels like she needs to take a deep breath when it occurs, feels like there is a hollow feeling in the chest.  No lightheadedness, chest pain, or shortness of breath. Review of Systems       Objective   Physical Exam  Vitals reviewed. Constitutional:       General: She is not in acute distress. Appearance: Normal appearance. She is well-developed. HENT:      Head: Normocephalic and atraumatic. Cardiovascular:      Rate and Rhythm: Normal rate and regular rhythm. Occasional extrasystoles are present. Heart sounds: Normal heart sounds. Pulmonary:      Effort: Pulmonary effort is normal. No respiratory distress. Breath sounds: Normal breath sounds. Skin:     General: Skin is warm and dry. Neurological:      Mental Status: She is alert and oriented to person, place, and time. Psychiatric:         Behavior: Behavior normal.         Thought Content:  Thought content normal.         Judgment: Judgment normal.                  An electronic signature was used to authenticate this note.     --Argenis Almeida MD

## 2022-06-02 ENCOUNTER — APPOINTMENT (OUTPATIENT)
Dept: NEUROLOGY | Age: 72
End: 2022-06-02
Payer: MEDICARE

## 2022-06-02 ENCOUNTER — HOSPITAL ENCOUNTER (OUTPATIENT)
Dept: NON INVASIVE DIAGNOSTICS | Age: 72
Discharge: HOME | End: 2022-06-02
Payer: MEDICARE

## 2022-06-02 ENCOUNTER — HOSPITAL ENCOUNTER (OUTPATIENT)
Dept: NEUROLOGY | Age: 72
Discharge: HOME | End: 2022-06-02
Payer: MEDICARE

## 2022-06-02 DIAGNOSIS — R00.2 PALPITATION: ICD-10-CM

## 2022-06-02 LAB
ANION GAP SERPL CALCULATED.3IONS-SCNC: 15 MMOL/L (ref 3–16)
BUN BLDV-MCNC: 20 MG/DL (ref 7–20)
CALCIUM SERPL-MCNC: 9.8 MG/DL (ref 8.3–10.6)
CHLORIDE BLD-SCNC: 103 MMOL/L (ref 99–110)
CO2: 25 MMOL/L (ref 21–32)
CREAT SERPL-MCNC: 0.8 MG/DL (ref 0.6–1.2)
GFR AFRICAN AMERICAN: >60
GFR NON-AFRICAN AMERICAN: >60
GLUCOSE BLD-MCNC: 100 MG/DL (ref 70–99)
LV EF: 60 %
LVEF MODALITY: NORMAL
MAGNESIUM: 2 MG/DL (ref 1.8–2.4)
POTASSIUM SERPL-SCNC: 4.4 MMOL/L (ref 3.5–5.1)
SODIUM BLD-SCNC: 143 MMOL/L (ref 136–145)
TSH REFLEX: 3.31 UIU/ML (ref 0.27–4.2)

## 2022-06-02 PROCEDURE — 93225 XTRNL ECG REC<48 HRS REC: CPT

## 2022-06-02 PROCEDURE — 6360000004 HC RX CONTRAST MEDICATION: Performed by: INTERNAL MEDICINE

## 2022-06-02 PROCEDURE — C8929 TTE W OR WO FOL WCON,DOPPLER: HCPCS

## 2022-06-02 PROCEDURE — 93226 XTRNL ECG REC<48 HR SCAN A/R: CPT

## 2022-06-02 RX ADMIN — PERFLUTREN 1.65 MG: 6.52 INJECTION, SUSPENSION INTRAVENOUS at 10:11

## 2022-06-03 RX ORDER — LOSARTAN POTASSIUM AND HYDROCHLOROTHIAZIDE 12.5; 1 MG/1; MG/1
TABLET ORAL
Qty: 90 TABLET | Refills: 1 | Status: SHIPPED | OUTPATIENT
Start: 2022-06-03

## 2022-06-07 ENCOUNTER — HOSPITAL ENCOUNTER (OUTPATIENT)
Dept: PHYSICAL THERAPY | Age: 72
Setting detail: THERAPIES SERIES
Discharge: HOME | End: 2022-06-07
Payer: MEDICARE

## 2022-06-07 PROCEDURE — 97161 PT EVAL LOW COMPLEX 20 MIN: CPT

## 2022-06-07 PROCEDURE — 97140 MANUAL THERAPY 1/> REGIONS: CPT

## 2022-06-07 PROCEDURE — 97112 NEUROMUSCULAR REEDUCATION: CPT

## 2022-06-07 PROCEDURE — 97530 THERAPEUTIC ACTIVITIES: CPT

## 2022-06-07 NOTE — PLAN OF CARE
Pedro Pablo, [facility], [address]  Phone: [phone]   Fax: [phone]     [facility]    Dear Dr. Bethany Partida  ,    We had the pleasure of evaluating the following patient for physical therapy services at [address] [place] [address]. A summary of our findings can be found in the initial assessment below. This includes our plan of care. If you have any questions or concerns regarding these findings, please do not hesitate to contact me at the office phone number checked above. Thank you for the referral.       Physician Signature:_______________________________Date:__________________  By signing above (or electronic signature), therapists plan is approved by physician      Patient: Jess Carter   : 1950   MRN: [id number]  Referring Physician:        Evaluation Date: 2022      Medical Diagnosis Information:  Diagnosis: M54.50 (ICD-10-CM) - Lumbar back pain   Treatment Diagnosis: lumbopelvic malalignment, SIJ dysfunction, anterior femoral glide, painful transfers and ADLs                                          Insurance information: Medicare     Precautions/ Contra-indications: n/a   Latex Allergy:  [x]NO      []YES  Preferred Language for Healthcare:   [x]English       []Other:    C-SSRS Triggered by Intake questionnaire (Past 2 wk assessment ):   [x] No, Questionnaire did not trigger screening.   [] Yes, Patient intake triggered C-SSRS Screening     [] Completed, no further action required. [] Completed, PCP notified via Epic    SUBJECTIVE: Patient stated complaint: returns to PT for similar issue as last episode. Went to ortho MD who referred her back to PT. Had an MRI of spine and x-ray of spine and pelvis with no significant findings. States her pain has not resolved since end of last year.  Has been on medication, tried alternative treatments, but continues to have discomfort in lower back and down R lateral thigh. States simple movements like lifting the leg and rolling over in bed are extremely challenging. Nothing seems to be helping, wants to be optimistic that PT will help this time. Fear avoidance: I should not do physical activities that (might) make my pain worse   [x] True   [] False     Functional Outcome: FOTO - physical primary measure = 54, risk adjusted = 48    Pain Scale: 4/10  Easing factors: nothing much, rest, sometimes ice, rarely medication   Provocative factors: hip flexion in standing, steps, rolling over in bed     Type: [x]Constant   []Intermittent  []Radiating []Localized []other:     Numbness/Tingling: sometimes tingling present down lateral thigh     Occupation/School: retired     Living Status/Prior Level of Function: Prior to this injury / incident, pt was independent with ADLs and IADLs.  CLOF: impaired ADLs with pain, unable to perform reciprocal steps     Relevant Medical History: osteopenia (mild)   Co-morbidities/Complexities (which will affect course of rehabilitation):   []None        []Hx of COVID   Arthritic conditions   []Rheumatoid arthritis (M05.9)  []Osteoarthritis (M19.91)  []Gout   Cardiovascular conditions   []Hypertension (I10)  []Hyperlipidemia (E78.5)  []Angina pectoris (I20)  []Atherosclerosis (I70)  []Pacemaker  []Hx of CABG/stent/  cardiac surgeries   Musculoskeletal conditions   []Disc pathology   []Congenital spine pathologies   []Osteoporosis (M81.8)  [x]Osteopenia (M85.8)  []Scoliosis       Endocrine conditions   []Hypothyroid (E03.9)  []Hyperthyroid Gastrointestinal conditions   []Constipation (K09.70)   Metabolic conditions   []Morbid obesity (E66.01)  []Diabetes type 1(E10.65) or 2 (E11.65)   []Neuropathy (G60.9)     Cardio/Pulmonary conditions   []Asthma (J45)  []Coughing   []COPD (J44.9)  []CHF  []A-fib   Psychological Disorders  []Anxiety (F41.9)  []Depression (F32.9)   []Other:   Developmental Disorders  []Autism (F84.0)  []CP (G80)  []Down Syndrome (Q90.9)  []Developmental delay     Neurological conditions  []Prior Stroke (I69.30)  []Parkinson's (G20)  []Encephalopathy (G93.40)  []MS (G35)  []Post-polio (G14)  []SCI  []TBI  []ALS Other conditions  []Fibromyalgia (M79.7)  []Vertigo  []Syncope  []Kidney Failure  []Cancer      []currently undergoing                treatment  []Pregnancy  []Incontinence   Prior surgeries  []involved limb  []previous spinal surgery  [] section birth  []hysterectomy  []bowel / bladder surgery  []other relevant surgeries   []Other:                OBJECTIVE:   Palpation: TTP at SIJ (B), greater trochanter, lateral thigh musculature     Functional Mobility/Transfers: independent but guarded and cautious, decreased weight acceptance on R LE    Posture: increased lumbar lordosis, ant pelvic tilt    Gait: independent, Trendelenburg (B), decreased pelvic mobility, decreased step length     Inspection: anterior femoral glide on R with hypertrophied hip flexor musculature     Bandages/Dressings/Incisions: NA      Standing Exam:  Balance:    Eyes open > 15 sec? Eyes closed > 15 sec?    Romberg  Yes []  No []  Yes []  No []   SLS  Yes []  No []  Yes []  No []        Hautard's Test > 15 sec > 15 sec   Head turned: L/R L: Yes []  No [] R: Yes []  No []   Head turned: up/down Up: Yes []  No [] Down: Yes []  No []        Normal Abnormal N/A Comments   Toe walk   x   S1   Heel Walk x   L4   Unilateral HR    S1-2, Normal=10   Unilateral sit to stand    L2-3     Pelvic landmarks: Iliac crests  Front Level [] High left [] High right []   Back Level [x] High left [] High right []   PSIS Level [x] High left [] High right []   ASIS Level [] High left [] High right []     Standing flexion test (PSIS):  Negative [x] Positive Left [] Positive Right []    Response to movement:  Lumbar Worsened Improved Status Quo ROM   Extension [] [] [x]    SB R [x] [] []    SB L [] [] [x]    Ext in right pelvic transloc [] [] [] Ext in left pelvic transloc [] [] []    Flexion [] [] [x]    Repeated flexion in standing [] [] []    Repeated extension in standing [] [] []    Painful arc:   Yes []  No [x]  Reverse rhythm: Yes []  No []  Catching:   Yes []  No [x]  Aberrant movement:  Yes []  No []  Trendelenberg:  Yes [x]  No []  R/L?: B    Seated Exam:  Reflexes Grade Comments    S1-2 Seated achilles 2  0 = absent   S1-2 Prone knee bend   1 = diminished   L3-4 Patellar tendon 2  2 = normal   Clonus 2  3 = hyper reflexive   Babinski 2     Pablo's        Dermatomes Normal Abnormal Comments   inguinal area (L1)  x     anterior mid-thigh (L2) x     distal ant thigh/med knee (L3) x     medial lower leg and foot (L4) x     lateral lower leg and foot (L5) x     posterior calf (S1) x     medial calcaneus (S2) x       Pelvic landmarks:  PSIS Level [x] High left [] High right []   Iliac crests Level [x] High left [] High right []     Seated flexion test (PSIS):  Negative [x] Positive Left [] Positive Right []    Repeated flexion:  Improved [] Worsened []  Status Quo [x]    Slump test:  Negative [x] Positive []    ROM:   Equal bilaterally?  Comments   Hip IR Yes [x]   No []    Thoracic spine Yes [x]   No []    Thoracic spine with OP Yes []   No []      Strength:   Left Right Comments   Hip flexors (L1-2) 4- 3+    Hip Internal Rotators      Hip External Rotators      Quads (L2-4) 4 4-    Hamstrings  4 4-    Ankle Plantarflexion (S1-2)      Ankle Dorsiflexion (L4-5)      Ankle Inversion      Ankle Eversion (S1-2)      Great Toe Extension (L5)          Supine Exam:  Straight leg raise Left Right   Range of motion WNL WNL   Sciatica  Positive [] Negative [x] Positive [] Negative [x]     Long sit test:  Negative [x] Positive Left lengthens [] Positive Right lengthens []    PROM LEFT RIGHT Comments   Hip Flexion WFL [x] Limited [] WFL [] Limited [x]    Hip Abd WFL [] Limited [] WFL [] Limited []    Hip ER WFL [x] Limited [] WFL [x] Limited []    [facility] [] Limited [x] [facility] [] Limited [x]    Hip Extension WFL [] Limited [] WFL [] Limited []    Knee Ext      Knee Flex      Hamstring Flex WFL [] Limited [x] WFL [] Limited [x]    Piriformis WFL [] Limited [x] WFL [] Limited [x]      BERLIN:  Left Negative [x]  Pain []   (Groin [] Buttock [])   Right Negative []  Pain [x]   (Groin [] Buttock [x])     Response to repeated flexion:   Improved [] Worsened []  Status Quo [x]      Prone Exam:  Mobility  Pain   Level WFL Hypomobile Hypermobile  None Local Distant   L1 [x] [] []  [] [] []   L2 [x] [] []  [] [] []   L3 [x] [] []  [] [] []   L4 [] [x] []  [] [] []   L5 [] [x] []  [] [] []   Sacrum Hypomobile   [] [x] []     Hip IR: R 22 deg, L 22 deg  Hip extension MMT: R 3+, L 4-  Multifidi strength: R , L     Prone on elbows:  Improved [] Worsened []  Status Quo [x]    Response to repeated extension:   Improved [] Worsened []  Status Quo [x]    Femoral nerve stretch:  Negative [x] Positive []    Prone knee bend test:  Negative [x] Positive []    Segmental instability test:  Negative [x] Positive []      Sidelying Exam  Hip abduction MMT: R 3, L 3+      Additional Testing:  TA Muscle Contraction Scale  Criteria                                               Score  Quality of Contraction   Not Present      [] 0   Rapid, Superificial     [] 1   Just Perceptible     [] 2     Gentle, Slow      [] 3    Substitution   Resting       [] 0   Moderate to Strong     [] 1    Subtle Perceptible     [] 2   None       [] 3    Symmetry of Contraction   Unilateral       [] 0   Bilateral/Asymmetrical     [] 1   Symmetrical       [] 2    Breathing     Inability/Difficulty Breathing during contraction [] 0   Able to hold contraction while Breathing  [] 1    Holding   Able to Hold Contraction <10 s   [] 0   Able to Hold Contraction >10 s   [] 1      __/10  Adapted from Michael navarrete, Copyright 2009      Orthopedic Special Tests:   Pelvic component (3 of 4)     Standing flexion test Negative [x] Positive []    Seated flexion landmarks Negative [x] Positive []    Supine long sit test Negative [x] Positive []    Prone knee bend Negative [x] Positive []         Sacral component     Samir's sign Negative [] Positive [x]    Radiating symptoms but neurologically intact Yes [x]  No []    Asymmetric PSIS Yes []  No [x]    Prone knee bend Negative [x] Positive []    Sacral spring test Negative [] Positive [x]    Sacral sulci palpation Negative [] Positive [x]         SIJ component (3 of 5)     Distraction Negative [x] Positive []    Shear Negative [] Positive [x]    Gaeslan Negative [x] Positive []    Compression Negative [] Positive [x]    Sacral spring Negative [] Positive [x]          [x] Patient history, allergies, meds reviewed. Medical chart reviewed. See intake form. Review Of Systems (ROS):  [x]Performed Review of systems (Integumentary, CardioPulmonary, Neurological) by intake and observation. Intake form has been scanned into medical record. Patient has been instructed to contact their primary care physician regarding ROS issues if not already being addressed at this time. Barriers to/and or personal factors that will affect rehab potential:              [x]Age  []Sex    []Smoker              [x]Motivation/Lack of Motivation                        [x]Co-Morbidities              []Cognitive Function, education/learning barriers              []Environmental, home barriers              []profession/work barriers  [x]past PT/medical experience  []other:  Justification:     Falls Risk Assessment (30 days):   [x] Falls Risk assessed and no intervention required.   [] Falls Risk assessed and Patient requires intervention due to being higher risk   TUG score (>12s at risk):     [] Falls education provided, including         ASSESSMENT:   Functional Impairments:     [x]Noted lumbar/sacral/proximal hip hypomobility   []Noted lumbosacral and/or generalized hypermobility   [x]Decreased Lumbosacral/hip/LE functional ROM   []Decreased core/proximal hip strength and neuromuscular control    [x]Decreased LE functional strength    []Abnormal reflexes/sensation/myotomal/dermatomal deficits  []Reduced balance/proprioceptive control    []other:      Functional Activity Limitations (from functional questionnaire and intake)   []Reduced ability to tolerate prolonged functional positions   []Reduced ability or difficulty with changes of positions or transfers between positions   [x]Reduced ability to maintain good posture and demonstrate good body mechanics with sitting, bending, and lifting   [x]Reduced ability to sleep   []Reduced ability or tolerance with driving and/or computer work   [x]Reduced ability to perform lifting, reaching, carrying tasks   []Reduced ability to squat   []Reduced ability to forward bend   [x]Reduced ability to ambulate prolonged functional periods/distances/surfaces   [x]Reduced ability to ascend/descend stairs   []other:       Participation Restrictions   [x]Reduced participation in self care activities   [x]Reduced participation in home management activities   []Reduced participation in work activities   [x]Reduced participation in social activities. []Reduced participation in sport/recreational activities. Classification:   []Signs/symptoms consistent with Lumbar instability/stabilization subgroup. [x]Signs/symptoms consistent with Lumbar / sacral mobilization/manipulation subgroup, myotomes and dermatomes intact. Meets manipulation criteria.     []Signs/symptoms consistent with Lumbar direction specific/centralization subgroup   []Signs/symptoms consistent with Lumbar traction subgroup     []Signs/symptoms consistent with lumbar facet dysfunction   []Signs/symptoms consistent with lumbar stenosis type dysfunction   []Signs/symptoms consistent with nerve root involvement including myotome & dermatome dysfunction   []Signs/symptoms consistent with post-surgical status including: decreased ROM, strength and function. []Signs/symptoms consistent with pathology which may benefit from Dry needling     []other:      Prognosis/Rehab Potential:      []Excellent   [x]Good    []Fair   []Poor    Tolerance of evaluation/treatment:    []Excellent   [x]Good    []Fair   []Poor     Physical Therapy Evaluation Complexity Justification  [x] A history of present problem with:  [] no personal factors and/or comorbidities that impact the plan of care;  [x]1-2 personal factors and/or comorbidities that impact the plan of care  []3 personal factors and/or comorbidities that impact the plan of care  [x] An examination of body systems using standardized tests and measures addressing any of the following: body structures and functions (impairments), activity limitations, and/or participation restrictions;:  [] a total of 1-2 or more elements   [] a total of 3 or more elements   [x] a total of 4 or more elements   [x] A clinical presentation with:  [x] stable and/or uncomplicated characteristics   [] evolving clinical presentation with changing characteristics  [] unstable and unpredictable characteristics;   [x] Clinical decision making of [x] low, [] moderate, [] high complexity using standardized patient assessment instrument and/or measurable assessment of functional outcome.     [x] EVAL (LOW) 51387 (typically 15 minutes face-to-face)  [] EVAL (MOD) 76481 (typically 30 minutes face-to-face)  [] EVAL (HIGH) 18270 (typically 45 minutes face-to-face)  [] RE-EVAL     PLAN: Begin PT focusing on: proximal hip mobilizations, LB mobs, LB core activation, proximal hip activation, and HEP    Frequency/Duration:  1-2 days per week for 6-8 Weeks:  Interventions:  [x]  Therapeutic exercise including: strength training, ROM, for LE, Glutes and core   [x]  NMR activation and proprioception for glutes , LE and Core   [x]  Manual therapy as indicated for Hip complex, LE and spine to include: Dry Needling/IASTM, STM, PROM, Gr I-IV mobilizations, manipulation. [x]  Modalities as needed that may include: thermal agents, E-stim, Biofeedback, US, iontophoresis as indicated  [x]  Patient education on joint protection, postural re-education, activity modification, progression of HEP. HEP instruction: Written HEP instructions provided and reviewed. GOALS:  Patient stated goal: decrease pain with daily activities   [] Progressing: [] Met: [] Not Met: [] Adjusted    Therapist goals for Patient:   Short Term Goals: To be achieved in: 2 weeks  1. Independent in HEP and progression per patient tolerance, in order to prevent re-injury. [] Progressing: [] Met: [] Not Met: [] Adjusted  2. Patient will have a decrease in pain to facilitate improvement in movement, function, and ADLs as indicated by Functional Deficits. [] Progressing: [] Met: [] Not Met: [] Adjusted    Long Term Goals: To be achieved in: 8 weeks  1. FOTO index score of 61 or higher to assist with reaching prior level of function. [] Progressing: [] Met: [] Not Met: [] Adjusted  2. Patient will demonstrate increased lumbar and hip AROM to WNL, good LS mobility, good hip ROM to allow for proper joint functioning as indicated by patients Functional Deficits. [] Progressing: [] Met: [] Not Met: [] Adjusted  3. Patient will demonstrate an increase in Strength in R LE to > 4/5 with good proximal hip and core activation to allow for proper functional mobility as indicated by patients Functional Deficits. [] Progressing: [] Met: [] Not Met: [] Adjusted  4. Patient will return to functional activities including walking without increased symptoms or restriction. [] Progressing: [] Met: [] Not Met: [] Adjusted  5. Patient will be able to traverse stairs independently with single HR without pain in order to return to PLOF.      [] Progressing: [] Met: [] Not Met: [] Adjusted     Electronically signed by:  Mumtaz Javier PT, DPT, OMT-C    Therapist was present, directed the patient's care, made skilled judgement, and was responsible for assessment and treatment of the patient.       Shawn Armstrong, SPT

## 2022-06-08 LAB
ACQUISITION DURATION: NORMAL S
AVERAGE HEART RATE: 77 BPM
EKG DIAGNOSIS: NORMAL
HOLTER MAX HEART RATE: 100 BPM
HOOKUP DATE: NORMAL
HOOKUP TIME: NORMAL
MAX HEART RATE TIME/DATE: NORMAL
MIN HEART RATE TIME/DATE: NORMAL
MIN HEART RATE: 57 BPM
NUMBER OF QRS COMPLEXES: NORMAL
NUMBER OF SUPRAVENTRICULAR COUPLETS: 4
NUMBER OF SUPRAVENTRICULAR ECTOPICS: 8552
NUMBER OF SUPRAVENTRICULAR ISOLATED BEATS: 7237
NUMBER OF VENTRICULAR BIGEMINAL CYCLES: 0
NUMBER OF VENTRICULAR COUPLETS: 1
NUMBER OF VENTRICULAR ECTOPICS: 432

## 2022-06-08 NOTE — FLOWSHEET NOTE
[address] [place] [address]  Phone: [phone]   Fax: [phone]    Physical Therapy Treatment Note/ Progress Report:     Date:  2022    Patient Name:  Jayro Chacon    :  1950  MRN: [id number]  Restrictions/Precautions:    Medical/Treatment Diagnosis Information:  Diagnosis: M54.50 (ICD-10-CM) - Lumbar back pain              Treatment Diagnosis: lumbopelvic malalignment, SIJ dysfunction, anterior femoral glide, painful transfers and ADLs                                          Insurance information: Medicare    Physician Information:   Dr. Jeanie Jimenez of care signed (Y/N): []  Yes [x]  No    Date of Patient follow up with Physician:      Progress Report: []  Yes  [x]  No     Date Range for reporting period:  Beginnin22  Ending:     Progress report due (10 Rx/or 30 days whichever is less): visit #10 or 75     Recertification due (POC duration/ or 90 days whichever is less): visit #16 or 22 (8 weeks)     Visit # Insurance Allowable Auth required?  Date Range   1 [place] []  Yes  [x]  No PCY       Latex Allergy:  [x]NO      []YES  Preferred Language for Healthcare:   [x]English       []other:    Functional Scale:           Date assessed:  TO physical FS primary measure score = 54; risk adjusted = 48  22    Pain level:  4/10     SUBJECTIVE:  See eval    OBJECTIVE: See eval   Observation:    Test measurements:      RESTRICTIONS/PRECAUTIONS: n/a       Treatment based classification:    [x] mobilization/manipulation   [] stabilization   [] extension based   [] flexion based   [] lateral shift   [] traction   [] unspecified Components:   [] thoracolumbar   [] pelvic   [x] SIJ   [x] sacral   [] hip         Comparable sign: Fortins sign, + sacral spring     Exercises/Interventions:     Therapeutic Exercise (96469) Resistance / level Sets / Seconds Reps Notes / Cues   Bike       Supine piriformis stretch              Standing hip extension Therapeutic Activities (80478)  & Neuromuscular Re-ed (44992)       Patient Education  15'            Quad rock back w/ posterior glide R femur   20\" 5    Standing back to wall AAROM w/ eccentric lower hip flexion  2 10                                              Manual Intervention (42490)       Prone PA - lumbar  4'     Supine Lumbosacral manip  5'  No cavitation   Prone Hip Ext manip  4'     SIJ Manip in prone  4'     Hip belt mobs npv      Hip LA distraction  2'                            Modalities:     Pt. Education:  -pt educated on diagnosis, prognosis and expectations for rehab  -all pt questions were answered    Home Exercise Prorgam:  Eval HEP: quad rock w/ posterior glide on R femur, stading AAROM flex w/ towel with eccentric lower     Therapeutic Exercise and NMR EXR  [] (61282) Provided verbal/tactile cueing for activities related to strengthening, flexibility, endurance, ROM  for improvements in proximal hip and core control with self care, mobility, lifting and ambulation.  [] (83185) Provided verbal/tactile cueing for activities related to improving balance, coordination, kinesthetic sense, posture, motor skill, proprioception  to assist with core control in self care, mobility, lifting, and ambulation.   [] (55080) Therapist is in constant attendance of 2 or more patients providing skilled therapy interventions, but not providing any significant amount of measurable one-on-one time to either patient, for improvements in LE, proximal hip, and core control in self care, mobility, lifting, ambulation and eccentric single leg control.      Therapeutic Activities:    [] (30757 or 04516) Provided verbal/tactile cueing for activities related to improving balance, coordination, kinesthetic sense, posture, motor skill, proprioception and motor activation to allow for proper function  with self care and ADLs  [] (91852) Provided training and instruction to the patient for proper core and proximal hip recruitment and positioning with ambulation re-education     Home Exercise Program:    [x] (28787) Reviewed/Progressed HEP activities related to strengthening, flexibility, endurance, ROM of core, proximal hip and LE for functional self-care, mobility, lifting and ambulation   [] (98435) Reviewed/Progressed HEP activities related to improving balance, coordination, kinesthetic sense, posture, motor skill, proprioception of core, proximal hip and LE for self care, mobility, lifting, and ambulation      Manual Treatments:  PROM / STM / Oscillations-Mobs:  G-I, II, III, IV (PA's, Inf., Post.)  [] (77113) Provided manual therapy to mobilize proximal hip and LS spine soft tissue/joints for the purpose of modulating pain, promoting relaxation,  increasing ROM, reducing/eliminating soft tissue swelling/inflammation/restriction, improving soft tissue extensibility and allowing for proper ROM for normal function with self care, mobility, lifting and ambulation. Charges:  Timed Code Treatment Minutes: 40   Total Treatment Minutes: 70       [x] EVAL - LOW (90180)   [] EVAL - MOD (56267)  [] EVAL - HIGH (23459)  [] RE-EVAL (80838)  [] KH(39039) x       [] Ionto  [x] NMR (31315) x 1       [] Vaso  [x] Manual (96754) x 1      [] Ultrasound  [x] TA x 1        [] Mech Traction (33174)  [] Aquatic Therapy x     [] ES (un) (33249):   [] Home Management Training x  [] ES(attended) (79077)   [] Dry Needling 1-2 muscles (71264):  [] Dry Needling 3+ muscles (193592  [] Group:      [] Other:     Goals:   Patient stated goal: decrease pain with daily activities   []? Progressing: []? Met: []? Not Met: []? Adjusted     Therapist goals for Patient:   Short Term Goals: To be achieved in: 2 weeks  1. Independent in HEP and progression per patient tolerance, in order to prevent re-injury. []? Progressing: []? Met: []? Not Met: []? Adjusted  2.  Patient will have a decrease in pain to facilitate improvement in movement, function, and ADLs as indicated by Functional Deficits. []? Progressing: []? Met: []? Not Met: []? Adjusted     Long Term Goals: To be achieved in: 8 weeks  1. FOTO index score of 61 or higher to assist with reaching prior level of function. []? Progressing: []? Met: []? Not Met: []? Adjusted  2. Patient will demonstrate increased lumbar and hip AROM to WNL, good LS mobility, good hip ROM to allow for proper joint functioning as indicated by patients Functional Deficits. []? Progressing: []? Met: []? Not Met: []? Adjusted  3. Patient will demonstrate an increase in Strength in R LE to > 4/5 with good proximal hip and core activation to allow for proper functional mobility as indicated by patients Functional Deficits. []? Progressing: []? Met: []? Not Met: []? Adjusted  4. Patient will return to functional activities including walking without increased symptoms or restriction. []? Progressing: []? Met: []? Not Met: []? Adjusted  5. Patient will be able to traverse stairs independently with single HR without pain in order to return to PLOF. []? Progressing: []? Met: []? Not Met: []? Adjusted    Overall Progression Towards Functional goals/ Treatment Progress Update:  [] Patient is progressing as expected towards functional goals listed. [] Progression is slowed due to complexities/Impairments listed. [] Progression has been slowed due to co-morbidities.   [x] Plan just implemented, too soon to assess goals progression <30days   [] Goals require adjustment due to lack of progress  [] Patient is not progressing as expected and requires additional follow up with physician  [] Other    Persisting Functional Limitations/Impairments:  []Sitting []Standing   []Walking []Squatting/bending    []Stairs []ADL's    []Transfers []Reaching  []Housework []Job related tasks  []Driving []Sports/Recreation   []Sleeping []Other:    ASSESSMENT:  See eval    Treatment/Activity Tolerance:  [] Patient able to complete tx  [] Patient limited by fatique  [x] Patient limited by pain  [] Patient limited by other medical complications  [] Other:     Prognosis: [x] Good [] Fair  [] Poor    Patient Requires Follow-up: [x] Yes  [] No    PLAN: See eval. PT 1-2x / week for 6-8 weeks. [] Continue per plan of care [] Alter current plan (see comments)  [x] Plan of care initiated [] Hold pending MD visit [] Discharge    Electronically signed by: Lorne Yoon, PT, DPT, OMT-C    Therapist was present, directed the patient's care, made skilled judgement, and was responsible for assessment and treatment of the patient. Lea Landau, SPT     Note: If patient does not return for scheduled/ recommended follow up visits, this note will serve as a discharge from care along with most recent update on progress.

## 2022-06-09 ENCOUNTER — HOSPITAL ENCOUNTER (OUTPATIENT)
Dept: PHYSICAL THERAPY | Age: 72
Setting detail: THERAPIES SERIES
Discharge: HOME | End: 2022-06-09
Payer: MEDICARE

## 2022-06-09 PROCEDURE — 97140 MANUAL THERAPY 1/> REGIONS: CPT

## 2022-06-09 PROCEDURE — 20560 NDL INSJ W/O NJX 1 OR 2 MUSC: CPT

## 2022-06-09 PROCEDURE — 97530 THERAPEUTIC ACTIVITIES: CPT

## 2022-06-09 NOTE — FLOWSHEET NOTE
[address] [place] [address]  Phone: [phone]   Fax: [phone]    Physical Therapy Treatment Note/ Progress Report:     Date:  2022    Patient Name:  Ann Guy    :  1950  MRN: [id number]  Restrictions/Precautions:    Medical/Treatment Diagnosis Information:  Diagnosis: M54.50 (ICD-10-CM) - Lumbar back pain              Treatment Diagnosis: lumbopelvic malalignment, SIJ dysfunction, anterior femoral glide, painful transfers and ADLs                                          Insurance information: Medicare    Physician Information:   Dr. Mohinder English of care signed (Y/N): []  Yes [x]  No    Date of Patient follow up with Physician:      Progress Report: []  Yes  [x]  No     Date Range for reporting period:  Beginnin22  Ending:     Progress report due (10 Rx/or 30 days whichever is less): visit #10 or 8/2/10     Recertification due (POC duration/ or 90 days whichever is less): visit #16 or 22 (8 weeks)     Visit # Insurance Allowable Auth required? Date Range   2 [place] []  Yes  [x]  No PCY       Latex Allergy:  [x]NO      []YES  Preferred Language for Healthcare:   [x]English       []other:    Functional Scale:           Date assessed:  TO physical FS primary measure score = 54; risk adjusted = 48  22    Pain level:  2/10     SUBJECTIVE:  Pt states no significant pain change or mobility change since eval. Signed DN form. Performed HEP x1 yesterday.      OBJECTIVE: See eval   Observation:    Test measurements:      RESTRICTIONS/PRECAUTIONS: n/a       Treatment based classification:    [x] mobilization/manipulation   [] stabilization   [] extension based   [] flexion based   [] lateral shift   [] traction   [] unspecified Components:   [] thoracolumbar   [] pelvic   [x] SIJ   [x] sacral   [] hip         Comparable sign: Fortins sign, + sacral spring     Exercises/Interventions:     Therapeutic Exercise (32149) Resistance / level Sets / Seconds Reps Notes / Cues   Bike  5'     Supine piriformis stretch              Standing hip extension  1 15 R/L                                       Therapeutic Activities (14666)  & Neuromuscular Re-ed (31667)       Patient Education              Quad rock back w/ posterior glide R femur      Standing back to wall AAROM w/ eccentric lower hip flexion  2 10    Flex/ext isos of LEs for pelvic neutral re-set   5\" x5 each                                       Manual Intervention (79428)       Prone PA - lumbar      Supine Lumbosacral manip   No cavitation   Prone Hip Ext manip      SIJ Manip in prone      Hip belt mobs  4'     Hip LA distraction  4'            Dry Needling  15'              Modalities:     Dry needling manual therapy: consisted on the placement of 3 needles in the following muscles:  iliopsoas x2, iliacus. A 60 mm needle was inserted, piston, rotated, and coned to produce intramuscular mobilization. These techniques were used to restore functional range of motion, reduce muscle spasm and induce healing in the corresponding musculature. (19209)  Clean Technique was utilized today while applying Dry needling treatment. The treatment sites where cleaned with 70% solution of  isopropyl alcohol . The PT washed their hands and utilized treatment gloves along with hand  prior to inserting the needles. All needles where removed and discarded in the appropriate sharps container.          Pt. Education:  -pt educated on diagnosis, prognosis and expectations for rehab  -all pt questions were answered    Home Exercise Prorgam:  Eval HEP: quad rock w/ posterior glide on R femur, stading AAROM flex w/ towel with eccentric lower  6/9: counter pressure pelvic neutral w/ LEs, standing hip ext     Therapeutic Exercise and NMR EXR  [x] (00060) Provided verbal/tactile cueing for activities related to strengthening, flexibility, endurance, ROM  for improvements in proximal hip and core control with self care, mobility, lifting and ambulation.  [] (71755) Provided verbal/tactile cueing for activities related to improving balance, coordination, kinesthetic sense, posture, motor skill, proprioception  to assist with core control in self care, mobility, lifting, and ambulation.   [] (83634) Therapist is in constant attendance of 2 or more patients providing skilled therapy interventions, but not providing any significant amount of measurable one-on-one time to either patient, for improvements in LE, proximal hip, and core control in self care, mobility, lifting, ambulation and eccentric single leg control. Therapeutic Activities:    [x] (66692 or 94480) Provided verbal/tactile cueing for activities related to improving balance, coordination, kinesthetic sense, posture, motor skill, proprioception and motor activation to allow for proper function  with self care and ADLs  [] (62249) Provided training and instruction to the patient for proper core and proximal hip recruitment and positioning with ambulation re-education     Home Exercise Program:    [x] (46603) Reviewed/Progressed HEP activities related to strengthening, flexibility, endurance, ROM of core, proximal hip and LE for functional self-care, mobility, lifting and ambulation   [] (31533) Reviewed/Progressed HEP activities related to improving balance, coordination, kinesthetic sense, posture, motor skill, proprioception of core, proximal hip and LE for self care, mobility, lifting, and ambulation      Manual Treatments:  PROM / STM / Oscillations-Mobs:  G-I, II, III, IV (PA's, Inf., Post.)  [] (51008) Provided manual therapy to mobilize proximal hip and LS spine soft tissue/joints for the purpose of modulating pain, promoting relaxation,  increasing ROM, reducing/eliminating soft tissue swelling/inflammation/restriction, improving soft tissue extensibility and allowing for proper ROM for normal function with self care, mobility, lifting and ambulation. Charges:  Timed Code Treatment Minutes: 45   Total Treatment Minutes: 45       [] EVAL - LOW (05060)   [] EVAL - MOD (01353)  [] EVAL - HIGH (04952)  [] RE-EVAL (74190)  [x] RW(64312) x  1     [] Ionto  [] NMR (63589) x 1       [] Vaso  [x] Manual (77314) x 1      [] Ultrasound  [] TA x 1        [] Mech Traction (47996)  [] Aquatic Therapy x     [] ES (un) (73730):   [] Home Management Training x  [] ES(attended) (74850)   [x] Dry Needling 1-2 muscles (54704):  [] Dry Needling 3+ muscles (881512  [] Group:      [] Other:     Goals:   Patient stated goal: decrease pain with daily activities   []? Progressing: []? Met: []? Not Met: []? Adjusted     Therapist goals for Patient:   Short Term Goals: To be achieved in: 2 weeks  1. Independent in HEP and progression per patient tolerance, in order to prevent re-injury. []? Progressing: []? Met: []? Not Met: []? Adjusted  2. Patient will have a decrease in pain to facilitate improvement in movement, function, and ADLs as indicated by Functional Deficits. []? Progressing: []? Met: []? Not Met: []? Adjusted     Long Term Goals: To be achieved in: 8 weeks  1. FOTO index score of 61 or higher to assist with reaching prior level of function. []? Progressing: []? Met: []? Not Met: []? Adjusted  2. Patient will demonstrate increased lumbar and hip AROM to WNL, good LS mobility, good hip ROM to allow for proper joint functioning as indicated by patients Functional Deficits. []? Progressing: []? Met: []? Not Met: []? Adjusted  3. Patient will demonstrate an increase in Strength in R LE to > 4/5 with good proximal hip and core activation to allow for proper functional mobility as indicated by patients Functional Deficits. []? Progressing: []? Met: []? Not Met: []? Adjusted  4. Patient will return to functional activities including walking without increased symptoms or restriction. []? Progressing: []? Met: []? Not Met: []? Adjusted  5.  Patient will be able to traverse stairs independently with single HR without pain in order to return to PLOF. []? Progressing: []? Met: []? Not Met: []? Adjusted    Overall Progression Towards Functional goals/ Treatment Progress Update:  [] Patient is progressing as expected towards functional goals listed. [] Progression is slowed due to complexities/Impairments listed. [] Progression has been slowed due to co-morbidities. [x] Plan just implemented, too soon to assess goals progression <30days   [] Goals require adjustment due to lack of progress  [] Patient is not progressing as expected and requires additional follow up with physician  [] Other    Persisting Functional Limitations/Impairments:  []Sitting []Standing   []Walking []Squatting/bending    []Stairs []ADL's    []Transfers []Reaching  []Housework []Job related tasks  []Driving []Sports/Recreation   []Sleeping []Other:    ASSESSMENT:  Pt tolerated treatment w/ mild complaints of pain in lateral thigh and sacral region. Pt with improving hip flexion although initiation from neutral stance results in popping sensation a few times. Good response to DN with 2 muscle twitch response present. Consider continued DN at upcoming appt. Attempt to neutralize pelvis and mobilize SIJ as able with manual intervention. Pt to benefit from skilled PT. Treatment/Activity Tolerance:  [] Patient able to complete tx  [] Patient limited by fatique  [x] Patient limited by pain  [] Patient limited by other medical complications  [] Other:     Prognosis: [x] Good [] Fair  [] Poor    Patient Requires Follow-up: [x] Yes  [] No    PLAN: See eval. PT 1-2x / week for 6-8 weeks.    [] Continue per plan of care [] Alter current plan (see comments)  [x] Plan of care initiated [] Hold pending MD visit [] Discharge    Electronically signed by: Lorne Yoon, PT, DPT, OMT-C    Therapist was present, directed the patient's care, made skilled judgement, and was responsible for assessment and treatment of the patient. Flavio Sun, SIDRA     Note: If patient does not return for scheduled/ recommended follow up visits, this note will serve as a discharge from care along with most recent update on progress.

## 2022-06-14 ENCOUNTER — HOSPITAL ENCOUNTER (OUTPATIENT)
Dept: PHYSICAL THERAPY | Age: 72
Setting detail: THERAPIES SERIES
Discharge: HOME | End: 2022-06-14
Payer: MEDICARE

## 2022-06-14 PROCEDURE — 97110 THERAPEUTIC EXERCISES: CPT

## 2022-06-14 PROCEDURE — 97530 THERAPEUTIC ACTIVITIES: CPT

## 2022-06-14 PROCEDURE — 97140 MANUAL THERAPY 1/> REGIONS: CPT

## 2022-06-14 NOTE — FLOWSHEET NOTE
[address] [place] [address]  Phone: [phone]   Fax: [phone]    Physical Therapy Treatment Note/ Progress Report:     Date:  2022    Patient Name:  Marvin Maloney    :  1950  MRN: [id number]  Restrictions/Precautions:    Medical/Treatment Diagnosis Information:  Diagnosis: M54.50 (ICD-10-CM) - Lumbar back pain              Treatment Diagnosis: lumbopelvic malalignment, SIJ dysfunction, anterior femoral glide, painful transfers and ADLs                                          Insurance information: Medicare    Physician Information:   Dr. Arnav Gonzalez of care signed (Y/N): []  Yes [x]  No    Date of Patient follow up with Physician:      Progress Report: []  Yes  [x]  No     Date Range for reporting period:  Beginnin22  Ending:     Progress report due (10 Rx/or 30 days whichever is less): visit #10 or 85     Recertification due (POC duration/ or 90 days whichever is less): visit #16 or 22 (8 weeks)     Visit # Insurance Allowable Auth required? Date Range   3 [place] []  Yes  [x]  No PCY       Latex Allergy:  [x]NO      []YES  Preferred Language for Healthcare:   [x]English       []other:    Functional Scale:           Date assessed:  [facility] physical FS primary measure score = 54; risk adjusted = 48  22    Pain level:  2/10     SUBJECTIVE:  Pt reports that the back of her knees were sore the past couple days. Pain is located in R lateral hip.      OBJECTIVE:   : MRI found in media tab - dated 22    See eval   Observation:    Test measurements:      RESTRICTIONS/PRECAUTIONS: n/a       Treatment based classification:    [x] mobilization/manipulation   [] stabilization   [] extension based   [] flexion based   [] lateral shift   [] traction   [] unspecified Components:   [] thoracolumbar   [] pelvic   [x] SIJ   [x] sacral   [] hip         Comparable sign: [place] sign, + sacral spring     Exercises/Interventions:     Therapeutic Exercise (85767) Resistance / level Sets / Seconds Reps Notes / Cues   Bike  5'     IB calf stretch  Calf raises  30\"  2 2  10 6/14 - to assess posterior knee pain; pt denied reproduction of sx   Prone press up  2 10 6/14: one set before and one set after MT   Supine piriformis stretch              Standing hip extension                                        Therapeutic Activities (34303)  & Neuromuscular Re-ed (86473)       Patient Education              Quad rock back w/ posterior glide R femur      Standing back to wall AAROM w/ eccentric lower hip flexion     Flex/ext isos of LEs for pelvic neutral re-set             Extensive time spent reviewing MRI results found in media using anatomical model and google images to educate pt on findings; answered all pt questions  20'  6/14                        Manual Intervention (22473)       Prone PA - lumbar      Supine Lumbosacral manip   No cavitation   Prone Hip Ext manip      SIJ Manip in prone      Hip belt mobs      Hip LA distraction             Prone PA to upper lumbar segments GrI&II progressing to Gr III&IV 6x10\" ea  6/14                 Dry Needling                Modalities:     Dry needling manual therapy: consisted on the placement of 3 needles in the following muscles:  iliopsoas x2, iliacus. A 60 mm needle was inserted, piston, rotated, and coned to produce intramuscular mobilization. These techniques were used to restore functional range of motion, reduce muscle spasm and induce healing in the corresponding musculature. (59971)  Clean Technique was utilized today while applying Dry needling treatment. The treatment sites where cleaned with 70% solution of  isopropyl alcohol . The PT washed their hands and utilized treatment gloves along with hand  prior to inserting the needles. All needles where removed and discarded in the appropriate sharps container.          Pt. Education:  -pt educated on diagnosis, prognosis and expectations for rehab  -all pt questions were answered    Home Exercise Prorgam:  Eval HEP: quad rock w/ posterior glide on R femur, stading AAROM flex w/ towel with eccentric lower  6/9: counter pressure pelvic neutral w/ LEs, standing hip ext     Therapeutic Exercise and NMR EXR  [x] (14706) Provided verbal/tactile cueing for activities related to strengthening, flexibility, endurance, ROM  for improvements in proximal hip and core control with self care, mobility, lifting and ambulation.  [] (84321) Provided verbal/tactile cueing for activities related to improving balance, coordination, kinesthetic sense, posture, motor skill, proprioception  to assist with core control in self care, mobility, lifting, and ambulation.   [] (66068) Therapist is in constant attendance of 2 or more patients providing skilled therapy interventions, but not providing any significant amount of measurable one-on-one time to either patient, for improvements in LE, proximal hip, and core control in self care, mobility, lifting, ambulation and eccentric single leg control.      Therapeutic Activities:    [x] (71228 or 03222) Provided verbal/tactile cueing for activities related to improving balance, coordination, kinesthetic sense, posture, motor skill, proprioception and motor activation to allow for proper function  with self care and ADLs  [] (14403) Provided training and instruction to the patient for proper core and proximal hip recruitment and positioning with ambulation re-education     Home Exercise Program:    [x] (85028) Reviewed/Progressed HEP activities related to strengthening, flexibility, endurance, ROM of core, proximal hip and LE for functional self-care, mobility, lifting and ambulation   [] (26135) Reviewed/Progressed HEP activities related to improving balance, coordination, kinesthetic sense, posture, motor skill, proprioception of core, proximal hip and LE for self care, mobility, lifting, and ambulation      Manual Treatments:  PROM / STM / Oscillations-Mobs:  G-I, II, III, IV (PA's, Inf., Post.)  [] (42142) Provided manual therapy to mobilize proximal hip and LS spine soft tissue/joints for the purpose of modulating pain, promoting relaxation,  increasing ROM, reducing/eliminating soft tissue swelling/inflammation/restriction, improving soft tissue extensibility and allowing for proper ROM for normal function with self care, mobility, lifting and ambulation. Charges:  Timed Code Treatment Minutes: 45   Total Treatment Minutes: 45       [] EVAL - LOW (24522)   [] EVAL - MOD (62792)  [] EVAL - HIGH (63228)  [] RE-EVAL (31317)  [x] HM(28942) x  1     [] Ionto  [] NMR (11883) x        [] Vaso  [x] Manual (20042) x 1      [] Ultrasound  [x] TA x 1        [] Mech Traction (02702)  [] Aquatic Therapy x     [] ES (un) (93559):   [] Home Management Training x  [] ES(attended) (23330)   [] Dry Needling 1-2 muscles (56402):  [] Dry Needling 3+ muscles (509210  [] Group:      [] Other:     Goals:   Patient stated goal: decrease pain with daily activities   []? Progressing: []? Met: []? Not Met: []? Adjusted     Therapist goals for Patient:   Short Term Goals: To be achieved in: 2 weeks  1. Independent in HEP and progression per patient tolerance, in order to prevent re-injury. []? Progressing: []? Met: []? Not Met: []? Adjusted  2. Patient will have a decrease in pain to facilitate improvement in movement, function, and ADLs as indicated by Functional Deficits. []? Progressing: []? Met: []? Not Met: []? Adjusted     Long Term Goals: To be achieved in: 8 weeks  1. FOTO index score of 61 or higher to assist with reaching prior level of function. []? Progressing: []? Met: []? Not Met: []? Adjusted  2. Patient will demonstrate increased lumbar and hip AROM to WNL, good LS mobility, good hip ROM to allow for proper joint functioning as indicated by patients Functional Deficits. []? Progressing: []? Met: []? Not Met: []? Adjusted  3.  Patient will demonstrate an increase in Strength in R LE to > 4/5 with good proximal hip and core activation to allow for proper functional mobility as indicated by patients Functional Deficits. []? Progressing: []? Met: []? Not Met: []? Adjusted  4. Patient will return to functional activities including walking without increased symptoms or restriction. []? Progressing: []? Met: []? Not Met: []? Adjusted  5. Patient will be able to traverse stairs independently with single HR without pain in order to return to PLOF. []? Progressing: []? Met: []? Not Met: []? Adjusted    Overall Progression Towards Functional goals/ Treatment Progress Update:  [] Patient is progressing as expected towards functional goals listed. [] Progression is slowed due to complexities/Impairments listed. [] Progression has been slowed due to co-morbidities. [x] Plan just implemented, too soon to assess goals progression <30days   [] Goals require adjustment due to lack of progress  [] Patient is not progressing as expected and requires additional follow up with physician  [] Other    Persisting Functional Limitations/Impairments:  []Sitting []Standing   []Walking []Squatting/bending    []Stairs []ADL's    []Transfers []Reaching  []Housework []Job related tasks  []Driving []Sports/Recreation   []Sleeping []Other:    ASSESSMENT:  As noted above, extensive time taken to review results of MRI. Explained how L1-2 disturbance can contribute to pain with hip flexion and along lateral thigh. Pt verbalized understanding of education provided. Pt continues to report pain in R anterior hip with progression of R LE up step Pt very tender with prone CPA's to upper lumbar segments, though was able to tolerate GrIII&IV with continued PA's. Pt instructed to perform prone press ups 2-3x/day ~ 10 reps as tolerated. Pt with trigger point in R glute max, responded well to STM. Pt continues to present with very tender sacral spring as well.  Continue to progress interventions to address hypomobile segments and strengthen B LE's and core control to allow for improved standing, walking, and stair tolerance. Treatment/Activity Tolerance:  [] Patient able to complete tx  [] Patient limited by fatique  [x] Patient limited by pain  [] Patient limited by other medical complications  [] Other:     Prognosis: [x] Good [] Fair  [] Poor    Patient Requires Follow-up: [x] Yes  [] No    PLAN: See eval. PT 1-2x / week for 6-8 weeks. [x] Continue per plan of care [] Alter current plan (see comments)  [] Plan of care initiated [] Hold pending MD visit [] Discharge    Electronically signed by: Marguerite Dover, PT, DPT, OMT-C      Note: If patient does not return for scheduled/ recommended follow up visits, this note will serve as a discharge from care along with most recent update on progress.

## 2022-06-23 ENCOUNTER — HOSPITAL ENCOUNTER (OUTPATIENT)
Dept: GENERAL RADIOLOGY | Age: 72
Discharge: HOME | End: 2022-06-23
Payer: MEDICARE

## 2022-06-23 ENCOUNTER — HOSPITAL ENCOUNTER (OUTPATIENT)
Dept: PHYSICAL THERAPY | Age: 72
Setting detail: THERAPIES SERIES
Discharge: HOME | End: 2022-06-23
Payer: MEDICARE

## 2022-06-23 DIAGNOSIS — M81.6 LOCALIZED OSTEOPOROSIS (LEQUESNE): ICD-10-CM

## 2022-06-23 PROCEDURE — 20561 NDL INSJ W/O NJX 3+ MUSC: CPT

## 2022-06-23 PROCEDURE — 77080 DXA BONE DENSITY AXIAL: CPT

## 2022-06-23 PROCEDURE — 97110 THERAPEUTIC EXERCISES: CPT

## 2022-06-23 PROCEDURE — 97032 APPL MODALITY 1+ESTIM EA 15: CPT

## 2022-06-23 NOTE — FLOWSHEET NOTE
[address] [place], [place] [address]  Phone: [phone]   Fax: [phone]    Physical Therapy Treatment Note/ Progress Report:     Date:  2022    Patient Name:  Dorian Vizcaino    :  1950  MRN: [id number]  Restrictions/Precautions:    Medical/Treatment Diagnosis Information:  Diagnosis: M54.50 (ICD-10-CM) - Lumbar back pain              Treatment Diagnosis: lumbopelvic malalignment, SIJ dysfunction, anterior femoral glide, painful transfers and ADLs                                          Insurance information: Medicare    Physician Information:   Dr. Tatum Roldan of care signed (Y/N): []  Yes [x]  No    Date of Patient follow up with Physician:      Progress Report: []  Yes  [x]  No     Date Range for reporting period:  Beginnin22  Ending:     Progress report due (10 Rx/or 30 days whichever is less): visit #10 or 73     Recertification due (POC duration/ or 90 days whichever is less): visit #16 or 22 (8 weeks)     Visit # Insurance Allowable Auth required? Date Range   4 [place] []  Yes  [x]  No PCY       Latex Allergy:  [x]NO      []YES  Preferred Language for Healthcare:   [x]English       []other:    Functional Scale:           Date assessed:  [facility] physical FS primary measure score = 54; risk adjusted = 48  22    Pain level:  2/10     SUBJECTIVE:  Pt states pain limited at this time. Doesn't feel much improvements. Concerned things aren't getting better but understanding of physical therapy progressions.       OBJECTIVE:   : MRI found in media tab - dated 22     See eval   Observation:    Test measurements:      RESTRICTIONS/PRECAUTIONS: n/a       Treatment based classification:    [x] mobilization/manipulation   [] stabilization   [] extension based   [] flexion based   [] lateral shift   [] traction   [] unspecified Components:   [] thoracolumbar   [] pelvic   [x] SIJ   [x] sacral   [] hip         Comparable sign: [place] sign, + sacral spring     Exercises/Interventions:     Therapeutic Exercise (14653) Resistance / level Sets / Seconds Reps Notes / Cues   Bike  5'     IB calf stretch  Calf raises  6/14 - to assess posterior knee pain; pt denied reproduction of sx   Prone press up  6/14: one set before and one set after MT   Supine piriformis stretch       LTR  20\" 5 each    Quad rock back   20\" 3    Standing hip extension                                        Therapeutic Activities (31607)  & Neuromuscular Re-ed (74358)       Patient Education              Quad rock back w/ posterior glide R femur      Standing back to wall AAROM w/ eccentric lower hip flexion     Flex/ext isos of LEs for pelvic neutral re-set             Seated on SB w/ pelvic tilts  2 10 each                         Manual Intervention (34724)       Prone PA - lumbar      Supine Lumbosacral manip   No cavitation   Prone Hip Ext manip      SIJ Manip in prone      Hip belt mobs      Hip LA distraction             Prone PA to upper lumbar segments GrI&II progressing to Gr III&IV 6x10\" ea  6/14                 Dry Needling  15'     Estim  8'       Modalities:     Dry needling manual therapy: consisted on the placement of 6 needles in the following muscles: L2-4 multifidus. A 75 mm needle was inserted, piston, rotated, and coned to produce intramuscular mobilization. These techniques were used to restore functional range of motion, reduce muscle spasm and induce healing in the corresponding musculature. (48221)  Clean Technique was utilized today while applying Dry needling treatment. The treatment sites where cleaned with 70% solution of  isopropyl alcohol . The PT washed their hands and utilized treatment gloves along with hand  prior to inserting the needles. All needles where removed and discarded in the appropriate sharps container.        Attended low frequency (1-20Hz) electrical stimulation was utilized in conjunction with Dry Needling:  the Estim was manipulated between all above needles for a period of 8 min. at 4 volts. The low frequency electrical stimulation was used to help reduce muscle spasm and help to interrupt /[place] the pain cycle. (17398)     Pt. Education:  -pt educated on diagnosis, prognosis and expectations for rehab  -all pt questions were answered  6/14 - Extensive time spent reviewing MRI results found in media using anatomical model and google images to educate pt on findings; answered all pt questions    Home Exercise Prorgam:  Eval HEP: quad rock w/ posterior glide on R femur, stading AAROM flex w/ towel with eccentric lower  6/9: counter pressure pelvic neutral w/ LEs, standing hip ext     Therapeutic Exercise and NMR EXR  [x] (34108) Provided verbal/tactile cueing for activities related to strengthening, flexibility, endurance, ROM  for improvements in proximal hip and core control with self care, mobility, lifting and ambulation.  [] (07727) Provided verbal/tactile cueing for activities related to improving balance, coordination, kinesthetic sense, posture, motor skill, proprioception  to assist with core control in self care, mobility, lifting, and ambulation.   [] (34766) Therapist is in constant attendance of 2 or more patients providing skilled therapy interventions, but not providing any significant amount of measurable one-on-one time to either patient, for improvements in LE, proximal hip, and core control in self care, mobility, lifting, ambulation and eccentric single leg control.      Therapeutic Activities:    [x] (56896 or 63738) Provided verbal/tactile cueing for activities related to improving balance, coordination, kinesthetic sense, posture, motor skill, proprioception and motor activation to allow for proper function  with self care and ADLs  [] (27985) Provided training and instruction to the patient for proper core and proximal hip recruitment and positioning with ambulation re-education     Home Exercise Program: [x] (67354) Reviewed/Progressed HEP activities related to strengthening, flexibility, endurance, ROM of core, proximal hip and LE for functional self-care, mobility, lifting and ambulation   [] (92392) Reviewed/Progressed HEP activities related to improving balance, coordination, kinesthetic sense, posture, motor skill, proprioception of core, proximal hip and LE for self care, mobility, lifting, and ambulation      Manual Treatments:  PROM / STM / Oscillations-Mobs:  G-I, II, III, IV (PA's, Inf., Post.)  [] (83861) Provided manual therapy to mobilize proximal hip and LS spine soft tissue/joints for the purpose of modulating pain, promoting relaxation,  increasing ROM, reducing/eliminating soft tissue swelling/inflammation/restriction, improving soft tissue extensibility and allowing for proper ROM for normal function with self care, mobility, lifting and ambulation. Charges:  Timed Code Treatment Minutes: 45   Total Treatment Minutes: 45       [] EVAL - LOW (98659)   [] EVAL - MOD (35761)  [] EVAL - HIGH (22298)  [] RE-EVAL (17247)  [x] IA(07783) x  1     [] Ionto  [] NMR (99799) x        [] Vaso  [] Manual (76248) x 1      [] Ultrasound  [] TA x 1        [] Mech Traction (34170)  [] Aquatic Therapy x     [] ES (un) (60914):   [] Home Management Training x  [x] ES(attended) (66107)   [] Dry Needling 1-2 muscles (99100):  [x] Dry Needling 3+ muscles (270974  [] Group:      [] Other:     Goals:   Patient stated goal: decrease pain with daily activities   []? Progressing: []? Met: []? Not Met: []? Adjusted     Therapist goals for Patient:   Short Term Goals: To be achieved in: 2 weeks  1. Independent in HEP and progression per patient tolerance, in order to prevent re-injury. []? Progressing: []? Met: []? Not Met: []? Adjusted  2. Patient will have a decrease in pain to facilitate improvement in movement, function, and ADLs as indicated by Functional Deficits. []? Progressing: []? Met: []?  Not Met: []? Adjusted     Long Term Goals: To be achieved in: 8 weeks  1. FOTO index score of 61 or higher to assist with reaching prior level of function. []? Progressing: []? Met: []? Not Met: []? Adjusted  2. Patient will demonstrate increased lumbar and hip AROM to WNL, good LS mobility, good hip ROM to allow for proper joint functioning as indicated by patients Functional Deficits. []? Progressing: []? Met: []? Not Met: []? Adjusted  3. Patient will demonstrate an increase in Strength in R LE to > 4/5 with good proximal hip and core activation to allow for proper functional mobility as indicated by patients Functional Deficits. []? Progressing: []? Met: []? Not Met: []? Adjusted  4. Patient will return to functional activities including walking without increased symptoms or restriction. []? Progressing: []? Met: []? Not Met: []? Adjusted  5. Patient will be able to traverse stairs independently with single HR without pain in order to return to PLOF. []? Progressing: []? Met: []? Not Met: []? Adjusted    Overall Progression Towards Functional goals/ Treatment Progress Update:  [] Patient is progressing as expected towards functional goals listed. [] Progression is slowed due to complexities/Impairments listed. [] Progression has been slowed due to co-morbidities. [x] Plan just implemented, too soon to assess goals progression <30days   [] Goals require adjustment due to lack of progress  [] Patient is not progressing as expected and requires additional follow up with physician  [] Other    Persisting Functional Limitations/Impairments:  []Sitting []Standing   []Walking []Squatting/bending    []Stairs []ADL's    []Transfers []Reaching  []Housework []Job related tasks  []Driving []Sports/Recreation   []Sleeping []Other:    ASSESSMENT:  Pt tolerated treatment this date with no complications. However, she does have increased pain to palpation at L 12th rib and R QL that was not previously present.  Completed DN to lumbar multifidus with estim to promote improved tissue flexibility and spinal mobility as pt struggles with pelvic isolation from lumbar spine. Pt instructed to continued HEP daily to determine if problem responds to musculoskeletal interventions. Continue to progress interventions to address hypomobile segments and strengthen B LE's and core control to allow for improved standing, walking, and stair tolerance. If patient does not improve over the next two weeks, plan to return to MD.     Treatment/Activity Tolerance:  [] Patient able to complete tx  [] Patient limited by fatique  [x] Patient limited by pain  [] Patient limited by other medical complications  [] Other:     Prognosis: [x] Good [] Fair  [] Poor    Patient Requires Follow-up: [x] Yes  [] No    PLAN: See eval. PT 1-2x / week for 6-8 weeks. [x] Continue per plan of care [] Alter current plan (see comments)  [] Plan of care initiated [] Hold pending MD visit [] Discharge    Electronically signed by: Ritu King, PT, DPT, OMT-C    Therapist was present, directed the patient's care, made skilled judgement, and was responsible for assessment and treatment of the patient. Rayne Napier, SPT       Note: If patient does not return for scheduled/ recommended follow up visits, this note will serve as a discharge from care along with most recent update on progress.

## 2022-06-24 ENCOUNTER — TELEPHONE (OUTPATIENT)
Dept: CARDIOLOGY CLINIC | Age: 72
End: 2022-06-24

## 2022-06-24 NOTE — TELEPHONE ENCOUNTER
New pt. Schedule on 8-4-22. Oxana Milian told her to see dr Emily Nelson.  Pt has wore a monitor and had a echo through [employer] .

## 2022-06-27 ENCOUNTER — HOSPITAL ENCOUNTER (OUTPATIENT)
Dept: PHYSICAL THERAPY | Age: 72
Setting detail: THERAPIES SERIES
Discharge: HOME | End: 2022-06-27
Payer: MEDICARE

## 2022-06-27 PROCEDURE — 97140 MANUAL THERAPY 1/> REGIONS: CPT

## 2022-06-27 PROCEDURE — 97112 NEUROMUSCULAR REEDUCATION: CPT

## 2022-06-27 NOTE — TELEPHONE ENCOUNTER
Monitor showed SR with <1% PVC burden, 8 symptoms reported correlating with PVCs. Stable echo with preserved LVEF.

## 2022-06-27 NOTE — FLOWSHEET NOTE
[address] [place] [address]  Phone: [phone]   Fax: [phone]    Physical Therapy Treatment Note/ Progress Report:     Date:  2022    Patient Name:  Alina Castaneda    :  1950  MRN: [id number]  Restrictions/Precautions:    Medical/Treatment Diagnosis Information:  Diagnosis: M54.50 (ICD-10-CM) - Lumbar back pain              Treatment Diagnosis: lumbopelvic malalignment, SIJ dysfunction, anterior femoral glide, painful transfers and ADLs                                          Insurance information: Medicare    Physician Information:   Dr. Hudson Burger of [facility] signed (Y/N): []  Yes [x]  No    Date of Patient follow up with Physician:       Progress Report: []  Yes  [x]  No     Date Range for reporting period:  Beginnin22  Ending:     Progress report due (10 Rx/or 30 days whichever is less): visit #10 or 32     Recertification due (POC duration/ or 90 days whichever is less): visit #16 or 22 (8 weeks)     Visit # Insurance Allowable Auth required? Date Range   5 [place] []  Yes  [x]  No PCY       Latex Allergy:  [x]NO      []YES  Preferred Language for Healthcare:   [x]English       []other:    Functional Scale:           Date assessed:  [facility] physical FS primary measure score = 54; risk adjusted = 48  22    Pain level:  2/10     SUBJECTIVE:  Pt states she continues to have the same issues. Hasn't noticed much change since initiating therapy.       OBJECTIVE:   : MRI found in media tab - dated 22     See eval   Observation:   : L5/S1 left rotation, R PSIS elevated and positive during seated flexion test, supine>sit short to long on R   Test measurements:      RESTRICTIONS/PRECAUTIONS: n/a       Treatment based classification:    [x] mobilization/manipulation   [] stabilization   [] extension based   [] flexion based   [] lateral shift   [] traction   [] unspecified Components:   [] thoracolumbar   [] pelvic   [x] SIJ   [x] sacral   [] hip         Comparable sign: [place] sign, + sacral spring     Exercises/Interventions:     Therapeutic Exercise (57055) Resistance / level Sets / Seconds Reps Notes / Cues   Bike  5'     IB calf stretch  Calf raises  6/14 - to assess posterior knee pain; pt denied reproduction of sx   Prone press up  6/14: one set before and one set after MT   Supine piriformis stretch       LTR     Quad rock back             Standing hip extension  1 15 R/L                                       Therapeutic Activities (94797)  & Neuromuscular Re-ed (87770)       Patient Education              Quad rock back w/ posterior glide R femur      Standing back to wall AAROM w/ eccentric lower hip flexion     Flex/ext isos of LEs for pelvic neutral re-set             Seated on SB w/ pelvic tilts     Quadruped multifidus holds opp LE on airex 5\" 8 B    paloff press w/ multifidus walkout  Double lime 3 steps  5 presses x3 each           Manual Intervention (26574)       Prone PA - lumbar      Supine Lumbosacral manip   No cavitation   Prone Hip Ext manip  5'     SIJ Manip in prone  5'    Hip belt mobs      Hip LA distraction      Lazy man roll  5'     Hip flexor release in 90deg flex over bolster  6'     sidelying lumbar roll Grade V thrust 4'            Prone PA to upper lumbar segments GrI&II progressing to Gr III&IV   6/14                 Dry Needling      Estim        Modalities:     Dry needling manual therapy: consisted on the placement of 6 needles in the following muscles: L2-4 multifidus. A 75 mm needle was inserted, piston, rotated, and coned to produce intramuscular mobilization. These techniques were used to restore functional range of motion, reduce muscle spasm and induce healing in the corresponding musculature. (74953)  Clean Technique was utilized today while applying Dry needling treatment. The treatment sites where cleaned with 70% solution of  isopropyl alcohol .   The PT washed their hands and utilized treatment gloves along with hand  prior to inserting the needles. All needles where removed and discarded in the appropriate sharps container. Attended low frequency (1-20Hz) electrical stimulation was utilized in conjunction with Dry Needling:  the Estim was manipulated between all above needles for a period of 8 min. at 4 volts. The low frequency electrical stimulation was used to help reduce muscle spasm and help to interrupt /[place] the pain cycle. (17310)     Pt. Education:  -pt educated on diagnosis, prognosis and expectations for rehab  -all pt questions were answered  6/14 - Extensive time spent reviewing MRI results found in media using anatomical model and google images to educate pt on findings; answered all pt questions    Home Exercise Prorgam:  Eval HEP: quad rock w/ posterior glide on R femur, stading AAROM flex w/ towel with eccentric lower  6/9: counter pressure pelvic neutral w/ LEs, standing hip ext     Therapeutic Exercise and NMR EXR  [x] (67649) Provided verbal/tactile cueing for activities related to strengthening, flexibility, endurance, ROM  for improvements in proximal hip and core control with self care, mobility, lifting and ambulation.  [] (96516) Provided verbal/tactile cueing for activities related to improving balance, coordination, kinesthetic sense, posture, motor skill, proprioception  to assist with core control in self care, mobility, lifting, and ambulation.   [] (56573) Therapist is in constant attendance of 2 or more patients providing skilled therapy interventions, but not providing any significant amount of measurable one-on-one time to either patient, for improvements in LE, proximal hip, and core control in self care, mobility, lifting, ambulation and eccentric single leg control.      Therapeutic Activities:    [x] (43608 or 77573) Provided verbal/tactile cueing for activities related to improving balance, coordination, kinesthetic sense, posture, motor skill, proprioception and motor activation to allow for proper function  with self care and ADLs  [] (72711) Provided training and instruction to the patient for proper core and proximal hip recruitment and positioning with ambulation re-education     Home Exercise Program:    [x] (75350) Reviewed/Progressed HEP activities related to strengthening, flexibility, endurance, ROM of core, proximal hip and LE for functional self-care, mobility, lifting and ambulation   [] (20062) Reviewed/Progressed HEP activities related to improving balance, coordination, kinesthetic sense, posture, motor skill, proprioception of core, proximal hip and LE for self care, mobility, lifting, and ambulation      Manual Treatments:  PROM / STM / Oscillations-Mobs:  G-I, II, III, IV (PA's, Inf., Post.)  [] (20677) Provided manual therapy to mobilize proximal hip and LS spine soft tissue/joints for the purpose of modulating pain, promoting relaxation,  increasing ROM, reducing/eliminating soft tissue swelling/inflammation/restriction, improving soft tissue extensibility and allowing for proper ROM for normal function with self care, mobility, lifting and ambulation. Charges:  Timed Code Treatment Minutes: 45   Total Treatment Minutes: 45       [] EVAL - LOW (65567)   [] EVAL - MOD (58061)  [] EVAL - HIGH (53287)  [] RE-EVAL (33288)  [] QP(37548) x  1     [] Ionto  [x] NMR (89532) x 1       [] Vaso  [x] Manual (97507) x 2      [] Ultrasound  [] TA x 1        [] Mech Traction (05949)  [] Aquatic Therapy x     [] ES (un) (39538):   [] Home Management Training x  [] ES(attended) (19416)   [] Dry Needling 1-2 muscles (24849):  [] Dry Needling 3+ muscles (493885  [] Group:      [] Other:     Goals:   Patient stated goal: decrease pain with daily activities   []? Progressing: []? Met: []? Not Met: []? Adjusted     Therapist goals for Patient:   Short Term Goals: To be achieved in: 2 weeks  1.  Independent in HEP and progression per patient tolerance, in order to prevent re-injury. []? Progressing: []? Met: []? Not Met: []? Adjusted  2. Patient will have a decrease in pain to facilitate improvement in movement, function, and ADLs as indicated by Functional Deficits. []? Progressing: []? Met: []? Not Met: []? Adjusted     Long Term Goals: To be achieved in: 8 weeks  1. FOTO index score of 61 or higher to assist with reaching prior level of function. []? Progressing: []? Met: []? Not Met: []? Adjusted  2. Patient will demonstrate increased lumbar and hip AROM to WNL, good LS mobility, good hip ROM to allow for proper joint functioning as indicated by patients Functional Deficits. []? Progressing: []? Met: []? Not Met: []? Adjusted  3. Patient will demonstrate an increase in Strength in R LE to > 4/5 with good proximal hip and core activation to allow for proper functional mobility as indicated by patients Functional Deficits. []? Progressing: []? Met: []? Not Met: []? Adjusted  4. Patient will return to functional activities including walking without increased symptoms or restriction. []? Progressing: []? Met: []? Not Met: []? Adjusted  5. Patient will be able to traverse stairs independently with single HR without pain in order to return to PLOF. []? Progressing: []? Met: []? Not Met: []? Adjusted    Overall Progression Towards Functional goals/ Treatment Progress Update:  [] Patient is progressing as expected towards functional goals listed. [] Progression is slowed due to complexities/Impairments listed. [] Progression has been slowed due to co-morbidities.   [x] Plan just implemented, too soon to assess goals progression <30days   [] Goals require adjustment due to lack of progress  [] Patient is not progressing as expected and requires additional follow up with physician  [] Other    Persisting Functional Limitations/Impairments:  []Sitting []Standing   []Walking []Squatting/bending    []Stairs []ADL's []Transfers []Reaching  []Housework []Job related tasks  []Driving []Sports/Recreation   []Sleeping []Other:    ASSESSMENT:  [facility] pt presentation. Pt continues to demo sacral and pelvic positive test findings and are addressed accordingly with manual interventions. Pt significant pain to touch at R sacral base, PSIS, and greater trochanter. Address multifidus strengthening via NMR exercises in bold, along with unilateral hip extension in standing. Pt with no noticeable difference by end of session, but will continue to address manual interventions in upcoming appts. Pt instructed to continued HEP daily to determine if problem responds to musculoskeletal interventions. Continue to progress interventions to address hypomobile segments and strengthen B LE's and core control to allow for improved standing, walking, and stair tolerance. If patient does not improve over the next two weeks, plan to return to MD.     Treatment/Activity Tolerance:  [] Patient able to complete tx  [] Patient limited by fatique  [x] Patient limited by pain  [] Patient limited by other medical complications  [] Other:     Prognosis: [x] Good [] Fair  [] Poor    Patient Requires Follow-up: [x] Yes  [] No    PLAN: See eval. PT 1-2x / week for 6-8 weeks. [x] Continue per plan of care [] Alter current plan (see comments)  [] Plan of care initiated [] Hold pending MD visit [] Discharge    Electronically signed by: Radha Valenzuela PT, DPT, OMT-C    Therapist was present, directed the patient's care, made skilled judgement, and was responsible for assessment and treatment of the patient. Jacey Reyes, SPT       Note: If patient does not return for scheduled/ recommended follow up visits, this note will serve as a discharge from care along with most recent update on progress.

## 2022-06-30 ENCOUNTER — HOSPITAL ENCOUNTER (OUTPATIENT)
Dept: PHYSICAL THERAPY | Age: 72
Setting detail: THERAPIES SERIES
Discharge: HOME | End: 2022-06-30
Payer: MEDICARE

## 2022-06-30 PROCEDURE — 97110 THERAPEUTIC EXERCISES: CPT

## 2022-06-30 PROCEDURE — 97140 MANUAL THERAPY 1/> REGIONS: CPT

## 2022-06-30 PROCEDURE — 97112 NEUROMUSCULAR REEDUCATION: CPT

## 2022-06-30 NOTE — FLOWSHEET NOTE
[address] [facility]  Phone: [phone]   Fax: [phone]    Physical Therapy Treatment Note/ Progress Report:     Date:  2022    Patient Name:  Vimal Villar    :  1950  MRN: [id number]  Restrictions/Precautions:    Medical/Treatment Diagnosis Information:  Diagnosis: M54.50 (ICD-10-CM) - Lumbar back pain              Treatment Diagnosis: lumbopelvic malalignment, SIJ dysfunction, anterior femoral glide, painful transfers and ADLs                                          Insurance information: Medicare    Physician Information:   Dr. Marija Salvador of care signed (Y/N): []  Yes [x]  No    Date of Patient follow up with Physician:       Progress Report: []  Yes  [x]  No     Date Range for reporting period:  Beginnin22  Ending:     Progress report due (10 Rx/or 30 days whichever is less): visit #10 or 14     Recertification due (POC duration/ or 90 days whichever is less): visit #16 or 22 (8 weeks)     Visit # Insurance Allowable Auth required? Date Range   6 [place] []  Yes  [x]  No PCY       Latex Allergy:  [x]NO      []YES  Preferred Language for Healthcare:   [x]English       []other:    Functional Scale:           Date assessed:  TO physical FS primary measure score = 54; risk adjusted = 48  22    Pain level:  310     SUBJECTIVE:  Pt states no change again with last treatment focused on manual interventions. Is concerned about not improving. Needs to cancel next Thursday to take  to MD appt.  Will be calling to schedule f/u appt with Dr. Janett Portillo:   : MRI found in media tab - dated 22     See eval   Observation:   : L5/S1 left rotation, R PSIS elevated and positive during seated flexion test, supine>sit short to long on R   Test measurements:      RESTRICTIONS/PRECAUTIONS: n/a       Treatment based classification:    [x] mobilization/manipulation   [] stabilization   [] extension based   [] flexion based   [] lateral shift   [] traction   [] unspecified Components:   [] thoracolumbar   [] pelvic   [x] SIJ   [x] sacral   [] hip         Comparable sign: [place] sign, + sacral spring     Exercises/Interventions:     Therapeutic Exercise (32763) Resistance / level Sets / Seconds Reps Notes / Cues   Bike  5'     IB calf stretch  Calf raises  6/14 - to assess posterior knee pain; pt denied reproduction of sx   Prone press up  2 10 6/14: one set before and one set after MT   Supine piriformis stretch       LTR     Quad rock back      Seated HS Stretch  30\" 2 R/L    Seated lumbar stretch over SB  15\" 3 F/R/L           Standing hip extension  1 15 R/L                                       Therapeutic Activities (62205)  & Neuromuscular Re-ed (67884)       Patient Education              Quad rock back w/ posterior glide R femur      Standing back to wall AAROM w/ eccentric lower hip flexion     Flex/ext isos of LEs for pelvic neutral re-set             Seated on SB w/ pelvic tilts  2 10 each    Quadruped multifidus holds    paloff press w/ multifidus walkout     Bridge w/ march  1 12 B    Standing march w/ opp UE taps  1 15 B                  Manual Intervention (77758)       Prone PA - lumbar      Supine Lumbosacral manip   No cavitation   Prone Hip Ext manip  3'     Prone Sacral Fixation Grade III/V 4'    SIJ Manip in prone  3'    Hip belt mobs  2'     Hip LA distraction  2'     Lazy man roll  3'     Hip flexor release in 90deg flex over bolster  3'     sidelying lumbar roll            Prone PA to upper lumbar segments GrI&II progressing to Gr III&IV   6/14                 Dry Needling      Estim        Modalities:     Dry needling manual therapy: consisted on the placement of 6 needles in the following muscles: L2-4 multifidus. A 75 mm needle was inserted, piston, rotated, and coned to produce intramuscular mobilization.   These techniques were used to restore functional range of motion, reduce muscle spasm and induce healing in the corresponding musculature. (24025)  Clean Technique was utilized today while applying Dry needling treatment. The treatment sites where cleaned with 70% solution of  isopropyl alcohol . The PT washed their hands and utilized treatment gloves along with hand  prior to inserting the needles. All needles where removed and discarded in the appropriate sharps container. Attended low frequency (1-20Hz) electrical stimulation was utilized in conjunction with Dry Needling:  the Estim was manipulated between all above needles for a period of 8 min. at 4 volts. The low frequency electrical stimulation was used to help reduce muscle spasm and help to interrupt /[place] the pain cycle. (06341)     Pt.  Education:  -pt educated on diagnosis, prognosis and expectations for rehab  -all pt questions were answered  6/14 - Extensive time spent reviewing MRI results found in media using anatomical model and google images to educate pt on findings; answered all pt questions    Home Exercise Prorgam:  Eval HEP: quad rock w/ posterior glide on R femur, stading AAROM flex w/ towel with eccentric lower  6/9: counter pressure pelvic neutral w/ LEs, standing hip ext     Therapeutic Exercise and NMR EXR  [x] (81443) Provided verbal/tactile cueing for activities related to strengthening, flexibility, endurance, ROM  for improvements in proximal hip and core control with self care, mobility, lifting and ambulation.  [] (36062) Provided verbal/tactile cueing for activities related to improving balance, coordination, kinesthetic sense, posture, motor skill, proprioception  to assist with core control in self care, mobility, lifting, and ambulation.   [] (84607) Therapist is in constant attendance of 2 or more patients providing skilled therapy interventions, but not providing any significant amount of measurable one-on-one time to either patient, for improvements in LE, proximal hip, and core control in self care, mobility, lifting, ambulation and eccentric single leg control. Therapeutic Activities:    [x] (12904 or 35400) Provided verbal/tactile cueing for activities related to improving balance, coordination, kinesthetic sense, posture, motor skill, proprioception and motor activation to allow for proper function  with self care and ADLs  [] (71888) Provided training and instruction to the patient for proper core and proximal hip recruitment and positioning with ambulation re-education     Home Exercise Program:    [x] (45674) Reviewed/Progressed HEP activities related to strengthening, flexibility, endurance, ROM of core, proximal hip and LE for functional self-care, mobility, lifting and ambulation   [] (89552) Reviewed/Progressed HEP activities related to improving balance, coordination, kinesthetic sense, posture, motor skill, proprioception of core, proximal hip and LE for self care, mobility, lifting, and ambulation      Manual Treatments:  PROM / STM / Oscillations-Mobs:  G-I, II, III, IV (PA's, Inf., Post.)  [] (89589) Provided manual therapy to mobilize proximal hip and LS spine soft tissue/joints for the purpose of modulating pain, promoting relaxation,  increasing ROM, reducing/eliminating soft tissue swelling/inflammation/restriction, improving soft tissue extensibility and allowing for proper ROM for normal function with self care, mobility, lifting and ambulation.        Charges:  Timed Code Treatment Minutes: 46   Total Treatment Minutes: 46       [] EVAL - LOW (50394)   [] EVAL - MOD (80411)  [] EVAL - HIGH (95253)  [] RE-EVAL (08564)  [x] DV(60480) x  1     [] Ionto  [x] NMR (68196) x 1       [] Vaso  [x] Manual (78232) x 1      [] Ultrasound  [] TA x 1        [] Mech Traction (35166)  [] Aquatic Therapy x     [] ES (un) (71331):   [] Home Management Training x  [] ES(attended) (29665)   [] Dry Needling 1-2 muscles (31587):  [] Dry Needling 3+ muscles (737442  [] Group:      [] Other: Goals:   Patient stated goal: decrease pain with daily activities   []? Progressing: []? Met: []? Not Met: []? Adjusted     Therapist goals for Patient:   Short Term Goals: To be achieved in: 2 weeks  1. Independent in HEP and progression per patient tolerance, in order to prevent re-injury. []? Progressing: []? Met: []? Not Met: []? Adjusted  2. Patient will have a decrease in pain to facilitate improvement in movement, function, and ADLs as indicated by Functional Deficits. []? Progressing: []? Met: []? Not Met: []? Adjusted     Long Term Goals: To be achieved in: 8 weeks  1. FOTO index score of 61 or higher to assist with reaching prior level of function. []? Progressing: []? Met: []? Not Met: []? Adjusted  2. Patient will demonstrate increased lumbar and hip AROM to WNL, good LS mobility, good hip ROM to allow for proper joint functioning as indicated by patients Functional Deficits. []? Progressing: []? Met: []? Not Met: []? Adjusted  3. Patient will demonstrate an increase in Strength in R LE to > 4/5 with good proximal hip and core activation to allow for proper functional mobility as indicated by patients Functional Deficits. []? Progressing: []? Met: []? Not Met: []? Adjusted  4. Patient will return to functional activities including walking without increased symptoms or restriction. []? Progressing: []? Met: []? Not Met: []? Adjusted  5. Patient will be able to traverse stairs independently with single HR without pain in order to return to PLOF. []? Progressing: []? Met: []? Not Met: []? Adjusted    Overall Progression Towards Functional goals/ Treatment Progress Update:  [] Patient is progressing as expected towards functional goals listed. [] Progression is slowed due to complexities/Impairments listed. [] Progression has been slowed due to co-morbidities.   [x] Plan just implemented, too soon to assess goals progression <30days   [] Goals require adjustment due to lack of progress  [] Patient is not progressing as expected and requires additional follow up with physician  [] Other    Persisting Functional Limitations/Impairments:  []Sitting []Standing   []Walking []Squatting/bending    []Stairs []ADL's    []Transfers []Reaching  []Housework []Job related tasks  []Driving []Sports/Recreation   []Sleeping []Other:    ASSESSMENT:  Pt continues to demo sacral and pelvic positive test findings and are addressed accordingly with manual interventions outlined in bold. Pt significant pain to touch at R sacral base, PSIS, and greater trochanter. Pt with no noticeable difference by end of session, but will continue to address manual interventions in upcoming appts. Improved hip flexion with decreased pain during march w/ cue for trunk activation and \"leading with the knee\". Pt instructed to continued HEP daily to determine if problem responds to musculoskeletal interventions. Continue to progress interventions to address hypomobile segments and strengthen B LE's and core control to allow for improved standing, walking, and stair tolerance. If patient does not improve over the next two weeks, plan to return to MD.     Treatment/Activity Tolerance:  [] Patient able to complete tx  [] Patient limited by fatique  [x] Patient limited by pain  [] Patient limited by other medical complications  [] Other:     Prognosis: [x] Good [] Fair  [] Poor    Patient Requires Follow-up: [x] Yes  [] No    PLAN: See eval. PT 1-2x / week for 6-8 weeks. [x] Continue per plan of care [] Alter current plan (see comments)  [] Plan of care initiated [] Hold pending MD visit [] Discharge    Electronically signed by: Luis Bae, PT, DPT, OMT-C    Therapist was present, directed the patient's care, made skilled judgement, and was responsible for assessment and treatment of the patient.     Adonay Son, SPT       Note: If patient does not return for scheduled/ recommended follow up visits, this note will serve as a discharge from care along with most recent update on progress.

## 2022-07-05 ENCOUNTER — HOSPITAL ENCOUNTER (OUTPATIENT)
Dept: PHYSICAL THERAPY | Age: 72
Setting detail: THERAPIES SERIES
Discharge: HOME | End: 2022-07-05
Payer: MEDICARE

## 2022-07-05 PROCEDURE — 97112 NEUROMUSCULAR REEDUCATION: CPT

## 2022-07-05 PROCEDURE — 97530 THERAPEUTIC ACTIVITIES: CPT

## 2022-07-05 PROCEDURE — 97110 THERAPEUTIC EXERCISES: CPT

## 2022-07-05 NOTE — PLAN OF CARE
[address]  Phone: [phone]   Fax: [phone]  Physical Therapy Re-Certification Plan of Care    Dear Liana Currie MD  ,    We had the pleasure of treating the following patient for physical therapy services at [facility]. A summary of our findings can be found in the updated assessment below. This includes our plan of care. If you have any questions or concerns regarding these findings, please do not hesitate to contact me at the office phone number checked above. Thank you for the referral.     Physician Signature:________________________________Date:__________________  By signing above (or electronic signature), therapist's plan is approved by physician      Functional Outcome:  FOTO physical FS primary measure score = 57     Strength:    Left Right Comments   Hip flexors (L1-2) 4- 3+     Hip Internal Rotators         Hip External Rotators         Quads (L2-4) 4 4-     Hamstrings  4 4-       Mobility   Pain   Level WFL Hypomobile Hypermobile   None Local Distant   L1 [x]?  []?  []?    []?  []?  []?    L2 [x]?  []?  []?    []?  []?  []?    L3 [x]?  []?  []?    []?  []?  []?    L4 []?  [x]?  []?    []?  []?  []?    L5 []?  [x]?  []?    []?  []?  []?    Sacrum Hypomobile    []?  [x]?  []?          Overall Response to Treatment:   []Patient is responding well to treatment and improvement is noted with regards  to goals   []Patient should continue to improve in reasonable time if they continue HEP   []Patient has plateaued and is no longer responding to skilled PT intervention    [x]Patient is getting worse and would benefit from return to referring MD   []Patient unable to adhere to initial POC   [x]Other: Elvis Gomez has participated in a month of PT with no improvements in symptoms or objective measures. She continues to have c/o pain in posterior R low back down R lateral thigh.  Many manual interventions have been performed to address patient's structural deficits including manipulation, mobilization, and dry needling, with no improvements noted. She has significant pain response to any palpation at R sacral base or R lateral thigh, including greater trochanter. She has daily pain with various activities including walking, stair navigation, and sit<>stand. Pt to return to MD due to lack of progress and continued significant pain. Pt to return to PT PRN or per MD orders if desired. At this time, therapy placed on hold and patient educated (printed) on HEP for continued trunk/LE strengthening while waiting to see MD.     Date range of Visits: -22  Total Visits: 7    Recommendation:    []Continue PT x / wk for  weeks. [x]Hold PT, pending MD visit      Physical Therapy Treatment Note/ Progress Report:     Date:  2022    Patient Name:  Rick Patel    :  1950  MRN: [id number]  Restrictions/Precautions:    Medical/Treatment Diagnosis Information:  Diagnosis: M54.50 (ICD-10-CM) - Lumbar back pain              Treatment Diagnosis: lumbopelvic malalignment, SIJ dysfunction, anterior femoral glide, painful transfers and ADLs                                          Insurance information: Medicare    Physician Information:   Dr. Jc Minus of care signed (Y/N): [x]  Yes []  No    Date of Patient follow up with Physician:  22     Progress Report: [x]  Yes  []  No     Date Range for reporting period:  Beginnin22  POC: 22  Ending:     Progress report due (10 Rx/or 30 days whichever is less): visit #10 or 24     Recertification due (POC duration/ or 90 days whichever is less): visit #16 or 22 (8 weeks)     Visit # Insurance Allowable Auth required?  Date Range   7 MN []  Yes  [x]  No PCY       Latex Allergy:  [x]NO      []YES  Preferred Language for Healthcare:   [x]English       []other:    Functional Scale:           Date assessed:  FOTO physical FS primary measure score = 57     7/5/22  FOTO physical FS primary measure score = 54; risk adjusted = 48  6/7/22      Pain level:  3/10     SUBJECTIVE:  Pt states no change since last session. Remains thankful for PT but understands this would be \"failing conservative treatment\". She has scheduled an appt with Dr. Pascale Acosta for 7/19/22. She states she has remained compliant with HEP. Continues to have significant R sided low back pain and R lateral thigh pain.      OBJECTIVE:   6/14: MRI found in media tab - dated 2/2/22     See eval   Observation:   6/27: L5/S1 left rotation, R PSIS elevated and positive during seated flexion test, supine>sit short to long on R   Test measurements:      RESTRICTIONS/PRECAUTIONS: n/a       Treatment based classification:    [x] mobilization/manipulation   [] stabilization   [] extension based   [] flexion based   [] lateral shift   [] traction   [] unspecified Components:   [] thoracolumbar   [] pelvic   [x] SIJ   [x] sacral   [] hip         Comparable sign: [place] sign, + sacral spring     Exercises/Interventions:     Therapeutic Exercise (73618) Resistance / level Sets / Seconds Reps Notes / Cues   Bike  5'     IB calf stretch  Calf raises  6/14 - to assess posterior knee pain; pt denied reproduction of sx   Prone press up  6/14: one set before and one set after MT   Supine piriformis stretch       LTR     Quad rock back      Seated HS Stretch  30\" 2 R/L    Seated lumbar stretch over SB  15\" 3 F/R/L           Standing hip extension  1 15 R/L    clamshells  1 15    Side stepping  20 feet x2                         Therapeutic Activities (22099)  & Neuromuscular Re-ed (63430)       PN assessment / POC updates / Patient education  13'           Quad rock back w/ posterior glide R femur      Standing back to wall AAROM w/ eccentric lower hip flexion     Flex/ext isos of LEs for pelvic neutral re-set             Seated on SB w/ pelvic tilts     Quadruped multifidus holds    paloff press w/ multifidus walkout     Bridge w/ march  1 15 B    Standing march w/ opp UE taps  1 15 B    Supine TA  5\" 15                         Manual Intervention (08788)       Prone PA - lumbar      Supine Lumbosacral manip   No cavitation   Prone Hip Ext manip      Prone Sacral Fixation Grade III/V     SIJ Manip in prone      Hip belt mobs      Hip LA distraction      Lazy man roll      Hip flexor release in 90deg flex over bolster      sidelying lumbar roll            Prone PA to upper lumbar segments GrI&II progressing to Gr III&IV   6/14                 Dry Needling      Estim        Modalities:     Dry needling manual therapy: consisted on the placement of 6 needles in the following muscles: L2-4 multifidus. A 75 mm needle was inserted, piston, rotated, and coned to produce intramuscular mobilization. These techniques were used to restore functional range of motion, reduce muscle spasm and induce healing in the corresponding musculature. (63582)  Clean Technique was utilized today while applying Dry needling treatment. The treatment sites where cleaned with 70% solution of  isopropyl alcohol . The PT washed their hands and utilized treatment gloves along with hand  prior to inserting the needles. All needles where removed and discarded in the appropriate sharps container. Attended low frequency (1-20Hz) electrical stimulation was utilized in conjunction with Dry Needling:  the Estim was manipulated between all above needles for a period of 8 min. at 4 volts. The low frequency electrical stimulation was used to help reduce muscle spasm and help to interrupt /Austin the pain cycle. (21831)     Pt.  Education:  -pt educated on diagnosis, prognosis and expectations for rehab  -all pt questions were answered  6/14 - Extensive time spent reviewing MRI results found in media using anatomical model and google images to educate pt on findings; answered all pt questions    Access Code: [email or web address]  URL: [email or web address]  Date: 07/05/2022  Prepared by: Carlitos Gupta    Exercises  Seated Hamstring Stretch - 1 x daily - 7 x weekly - 3 reps - 30s hold  Quadruped Rocking Slow - 1 x daily - 7 x weekly - 5 reps - 20s hold  Supine Transversus Abdominis Bracing - Hands on Stomach - 1 x daily - 7 x weekly - 20 reps - 5s hold  Supine March with Posterior Pelvic Tilt - 1 x daily - 7 x weekly - 2 sets - 10 reps  Supine Bridge with Mini Swiss Ball Between Knees - 1 x daily - 7 x weekly - 2 sets - 10 reps  Clamshell with Resistance - 1 x daily - 7 x weekly - 2 sets - 10 reps  Standing Hip Extension with Counter Support - 1 x daily - 7 x weekly - 2 sets - 10 reps  Standing March with Counter Support - 1 x daily - 7 x weekly - 2 sets - 10 reps  Side Stepping with Resistance at Ankles - 1 x daily - 7 x weekly - 2 sets - 10 reps      Home Exercise Prorgam:  Eval HEP: quad rock w/ posterior glide on R femur, stading AAROM flex w/ towel with eccentric lower  6/9: counter pressure pelvic neutral w/ LEs, standing hip ext     Therapeutic Exercise and NMR EXR  [x] (56308) Provided verbal/tactile cueing for activities related to strengthening, flexibility, endurance, ROM  for improvements in proximal hip and core control with self care, mobility, lifting and ambulation.  [] (86828) Provided verbal/tactile cueing for activities related to improving balance, coordination, kinesthetic sense, posture, motor skill, proprioception  to assist with core control in self care, mobility, lifting, and ambulation.   [] (28262) Therapist is in constant attendance of 2 or more patients providing skilled therapy interventions, but not providing any significant amount of measurable one-on-one time to either patient, for improvements in LE, proximal hip, and core control in self care, mobility, lifting, ambulation and eccentric single leg control.      Therapeutic Activities:    [x] (80365 or 19986) Provided verbal/tactile cueing for activities related to improving balance, coordination, kinesthetic sense, posture, motor skill, proprioception and motor activation to allow for proper function  with self care and ADLs  [] (36414) Provided training and instruction to the patient for proper core and proximal hip recruitment and positioning with ambulation re-education     Home Exercise Program:    [x] (63523) Reviewed/Progressed HEP activities related to strengthening, flexibility, endurance, ROM of core, proximal hip and LE for functional self-care, mobility, lifting and ambulation   [] (46087) Reviewed/Progressed HEP activities related to improving balance, coordination, kinesthetic sense, posture, motor skill, proprioception of core, proximal hip and LE for self care, mobility, lifting, and ambulation      Manual Treatments:  PROM / STM / Oscillations-Mobs:  G-I, II, III, IV (PA's, Inf., Post.)  [] (50488) Provided manual therapy to mobilize proximal hip and LS spine soft tissue/joints for the purpose of modulating pain, promoting relaxation,  increasing ROM, reducing/eliminating soft tissue swelling/inflammation/restriction, improving soft tissue extensibility and allowing for proper ROM for normal function with self care, mobility, lifting and ambulation. Charges:  Timed Code Treatment Minutes: 40   Total Treatment Minutes: 40       [] EVAL - LOW (28413)   [] EVAL - MOD (85895)  [] EVAL - HIGH (12023)  [] RE-EVAL (12527)  [x] VE(94663) x  1     [] Ionto  [x] NMR (77844) x 1       [] Vaso  [] Manual (46700) x 1      [] Ultrasound  [x] TA x 1        [] Mech Traction (69034)  [] Aquatic Therapy x     [] ES (un) (98644):   [] Home Management Training x  [] ES(attended) (59352)   [] Dry Needling 1-2 muscles (25830):  [] Dry Needling 3+ muscles (471632  [] Group:      [] Other:     Goals:   Patient stated goal: decrease pain with daily activities   []? Progressing: []? Met: [x]? Not Met: []? Adjusted     Therapist goals for Patient:   Short Term Goals:  To be achieved in: 2 weeks  1. Independent in HEP and progression per patient tolerance, in order to prevent re-injury. []? Progressing: [x]? Met: []? Not Met: []? Adjusted  2. Patient will have a decrease in pain to facilitate improvement in movement, function, and ADLs as indicated by Functional Deficits. []? Progressing: []? Met: [x]? Not Met: []? Adjusted     Long Term Goals: To be achieved in: 8 weeks  1. FOTO index score of 61 or higher to assist with reaching prior level of function. []? Progressing: []? Met: [x]? Not Met: []? Adjusted  2. Patient will demonstrate increased lumbar and hip AROM to WNL, good LS mobility, good hip ROM to allow for proper joint functioning as indicated by patients Functional Deficits. []? Progressing: []? Met: [x]? Not Met: []? Adjusted  3. Patient will demonstrate an increase in Strength in R LE to > 4/5 with good proximal hip and core activation to allow for proper functional mobility as indicated by patients Functional Deficits. []? Progressing: []? Met: [x]? Not Met: []? Adjusted  4. Patient will return to functional activities including walking without increased symptoms or restriction. []? Progressing: []? Met: [x]? Not Met: []? Adjusted  5. Patient will be able to traverse stairs independently with single HR without pain in order to return to PLOF. []? Progressing: []? Met: [x]? Not Met: []? Adjusted    Overall Progression Towards Functional goals/ Treatment Progress Update:  [] Patient is progressing as expected towards functional goals listed. [] Progression is slowed due to complexities/Impairments listed. [] Progression has been slowed due to co-morbidities.   [] Plan just implemented, too soon to assess goals progression <30days   [] Goals require adjustment due to lack of progress  [x] Patient is not progressing as expected and requires additional follow up with physician  [] Other    Persisting Functional Limitations/Impairments:  []Sitting []Standing   []Walking []Squatting/bending    []Stairs []ADL's    []Transfers []Reaching  []Housework []Job related tasks  []Driving []Sports/Recreation   []Sleeping []Other:    ASSESSMENT:  See POC Above. Treatment/Activity Tolerance:  [] Patient able to complete tx  [] Patient limited by fatique  [x] Patient limited by pain  [] Patient limited by other medical complications  [] Other:     Prognosis: [x] Good [] Fair  [] Poor    Patient Requires Follow-up: [x] Yes  [] No    PLAN: See eval. PT 1-2x / week for 6-8 weeks. [] Continue per plan of care [] Alter current plan (see comments)  [] Plan of care initiated [x] Hold pending MD visit [] Discharge    Electronically signed by: Den Rooney, PT, DPT, OMT-C    Therapist was present, directed the patient's care, made skilled judgement, and was responsible for assessment and treatment of the patient. Lynda Castellon, SPT       Note: If patient does not return for scheduled/ recommended follow up visits, this note will serve as a discharge from care along with most recent update on progress.

## 2022-07-07 ENCOUNTER — APPOINTMENT (OUTPATIENT)
Dept: PHYSICAL THERAPY | Age: 72
End: 2022-07-07
Payer: MEDICARE

## 2022-07-08 ENCOUNTER — HOSPITAL ENCOUNTER (OUTPATIENT)
Dept: WOMENS IMAGING | Age: 72
Discharge: HOME | End: 2022-07-08
Payer: MEDICARE

## 2022-07-08 VITALS — BODY MASS INDEX: 30.36 KG/M2 | WEIGHT: 165 LBS | HEIGHT: 62 IN

## 2022-07-08 DIAGNOSIS — Z12.31 VISIT FOR SCREENING MAMMOGRAM: ICD-10-CM

## 2022-07-08 PROCEDURE — 77063 BREAST TOMOSYNTHESIS BI: CPT

## 2022-07-25 NOTE — PROGRESS NOTES
[facility]   Cardiac Electrophysiology Consultation   Date: 8/4/2022  Reason for Consultation: palpitations  Consult Requesting Physician: No att. providers found     Chief Complaint:   Chief Complaint   Patient presents with    New Patient     Ireg heartbeat, wore a monitor and had echo         HPI: Rick Patel is a 70 y.o. female referred by Dr. Shayy Torres for irregular heartbeat. PMH significant for HTN, HLD, and palpitations with subsequent 24 hr Holter (6/2/22) showing <1% PAC, <1% PVC, and symptoms correlating with PVC. Normal echo. Interval History: Today, she is here for evaluation of palpitations, presenting in SR with 1 PVC and 2 PACs. 2 weeks prior to seeing Dr. Shayy Torres in 5/2022, she felt a \"hollow\" feeling in her chest, followed by brief LHD, with the hollow feeling lasting longer. This occurred frequently for about 2 weeks. She still gets this sensation now, but the frequency has decreased quite a bit. She cannot think of any triggers around the time of the frequent episodes. She struggles with back pain and received a steroid injection in 4/2022 which did not really help her. Her brother and mother both had AF. Her father had a stroke at a young age. Her  says that she snores. She is not as active as she'd like to be due to back pain. Assessment:  PAC, low burden  PVC, low burden, symptomatic  HTN, stable on amlodpine, Hyzaar  HLD  Family h/o AF and CVA    Plan:  As her symptoms have improved and aren't as frequent, will hold off on starting any medications today. Discussed lifestyle modifications to delay the potential onset of AF including treatment of ASHER, BP control, weight loss, healthy diet, and routine exercise.   Given her family history, discussed options of monitoring for AF including ILR implantation, Apple watch, or Aidhenscornera mobile  Refer to Dr. 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 Malick Pollock    COLONOSCOPY  2003    normal - dr Antoinette Montano 10/9/2018    Joellyn Rom performed by Chantel Anderson MD at [facility]  10/09/2018    [facility]    UPPER GASTROINTESTINAL ENDOSCOPY N/A 10/9/2018    EGD DIAGNOSTIC ONLY performed by Chantel Anderson MD at [facility] ENDOSCOPY       Allergies: Allergies   Allergen Reactions    Nsaids Other (See Comments)     History of GI bleed       Medication:   Prior to Admission medications    Medication Sig Start Date End Date Taking?  Authorizing Provider   bupivacaine (MARCAINE) 0.5 % injection Inject 30 mLs into the skin once   Yes Historical Provider, MD   losartan-hydroCHLOROthiazide (HYZAAR) 100-12.5 MG per tablet TAKE 1 TABLET BY MOUTH EVERY DAY 6/3/22  Yes Godwin Celeste MD   meloxicam (MOBIC) 15 MG tablet TAKE 1 TABLET BY MOUTH EVERY DAY AS NEEDED FOR PAIN 1/13/22  Yes Godwin Celeste MD   amLODIPine (NORVASC) 5 MG tablet Take 1 tablet by mouth daily 7/26/21  Yes Raudel Prasad MD   meclizine (ANTIVERT) 25 MG tablet TAKE 1 TABLET BY MOUTH THREE TIMES DAILY AS NEEDED FOR DIZZINESS 6/29/21  Yes Raudel Prasad MD   Calcium Carbonate Antacid (TUMS E-X PO) Take by mouth   Yes Historical Provider, MD   Cholecalciferol (VITAMIN D-3 PO) Take by mouth   Yes Historical Provider, MD   timolol (TIMOPTIC) 0.5 % ophthalmic solution  4/13/16  Yes Historical Provider, MD   therapeutic multivitamin-minerals (THERAGRAN-M) tablet Take 1 tablet by mouth daily    Yes Historical Provider, MD   omeprazole (PRILOSEC OTC) 20 MG tablet Take 20 mg by mouth 2 times daily. 5/13/10  Yes Historical Provider, MD   clobetasol (TEMOVATE) 0.05 % ointment Apply BID for up to 2 weeks torso, arms, scalp avoid face 10/27/21   Clay Bryant MD       Social History:   reports that she quit smoking about 56 years ago. Her smoking use included cigarettes. She has a 2.50 pack-year smoking history. She has never used smokeless tobacco. She reports current alcohol use. She reports that she does not use drugs. Family History:  family history includes Breast Cancer in her maternal aunt and paternal aunt; Diabetes in her brother; Heart Disease in her father; Kidney stones in her brother; Stroke in her mother. Reviewed. Denies family history of sudden cardiac death, arrhythmia, premature CAD    Review of System:    General ROS: negative for - chills, fever   Psychological ROS: negative for - anxiety or depression  Ophthalmic ROS: negative for - eye pain or loss of vision  ENT ROS: negative for - epistaxis, headaches, nasal discharge, sore throat   Allergy and Immunology ROS: negative for - hives, nasal congestion   Hematological and Lymphatic ROS: negative for - bleeding problems, blood clots, bruising or jaundice  Endocrine ROS: negative for - skin changes, temperature intolerance or unexpected weight changes  Respiratory ROS: negative for - cough, hemoptysis, pleuritic pain, SOB, sputum changes or wheezing  Cardiovascular ROS: Per HPI.    Gastrointestinal ROS: negative for - abdominal pain, blood in stools, diarrhea, hematemesis, melena, nausea/vomiting or swallowing difficulty/pain  Genito-Urinary ROS: negative for - dysuria or incontinence  Musculoskeletal ROS: negative for - joint swelling or muscle pain  Neurological ROS: negative for - confusion, dizziness, gait disturbance, headaches, numbness/tingling, seizures, speech problems, tremors, visual changes or weakness  Dermatological ROS: negative for - rash    Physical Examination:  Vitals:    08/04/22 1020   BP: 118/80   Pulse: 60       Constitutional: Oriented. No distress. Head: Normocephalic and atraumatic. Mouth/Throat: Oropharynx is clear and moist.   Eyes: Conjunctivae normal. EOM are normal.   Neck: Normal range of motion. Neck supple. No rigidity. No JVD present. Cardiovascular: Normal rate, regular rhythm, S1&S2 and intact distal pulses. Pulmonary/Chest: Bilateral respiratory sounds. No wheezes. No rhonchi. Abdominal: Soft. Bowel sounds present. No distension, No tenderness. Musculoskeletal: No tenderness. No edema    Lymphadenopathy: Has no cervical adenopathy. Neurological: Alert and oriented. Cranial nerve appears intact, No Gross deficit   Skin: Skin is warm and dry. No rash noted. Psychiatric: Has a normal mood, affect and behavior     Labs:  Reviewed. ECG: reviewed, Sinus  Rhythm 65 with 1 PVC and 2 PACs    Studies:   1. 24 hr Holter (6/2/22): SR with average HR 77 (), 432 PVC (<1%), PAC (<1%), symptoms correlating with PVC    2. Echo 6/2/22:   Summary   Technically difficult study due to poor acoustical window. Definity was   administered. Normal left ventricle size, wall thickness and systolic function with an   estimated ejection fraction of 60%. No regional wall motion abnormalities are seen. Normal diastolic function. E/e' = 10.1. The MCOT, echocardiogram, stress test, and coronary angiography/PCI were reviewed by myself and used for my plan of care. Thank you for allowing me to participate in the care of Nir Yan. All questions and concerns were addressed to the patient/family. Alternatives to my treatment were discussed.      Chula Carlos MD  Cardiac Electrophysiology  [facility]

## 2022-08-04 ENCOUNTER — OFFICE VISIT (OUTPATIENT)
Dept: CARDIOLOGY CLINIC | Age: 72
End: 2022-08-04
Payer: MEDICARE

## 2022-08-04 VITALS
BODY MASS INDEX: 30.84 KG/M2 | SYSTOLIC BLOOD PRESSURE: 118 MMHG | DIASTOLIC BLOOD PRESSURE: 80 MMHG | WEIGHT: 168.6 LBS | HEART RATE: 60 BPM

## 2022-08-04 DIAGNOSIS — I49.3 PVC (PREMATURE VENTRICULAR CONTRACTION): ICD-10-CM

## 2022-08-04 DIAGNOSIS — E78.5 HYPERLIPIDEMIA, UNSPECIFIED HYPERLIPIDEMIA TYPE: ICD-10-CM

## 2022-08-04 DIAGNOSIS — R00.2 PALPITATION: ICD-10-CM

## 2022-08-04 DIAGNOSIS — I49.1 PAC (PREMATURE ATRIAL CONTRACTION): ICD-10-CM

## 2022-08-04 DIAGNOSIS — I10 PRIMARY HYPERTENSION: ICD-10-CM

## 2022-08-04 DIAGNOSIS — R06.83 SNORING: Primary | ICD-10-CM

## 2022-08-04 PROCEDURE — 93000 ELECTROCARDIOGRAM COMPLETE: CPT | Performed by: INTERNAL MEDICINE

## 2022-08-04 PROCEDURE — 99204 OFFICE O/P NEW MOD 45 MIN: CPT | Performed by: INTERNAL MEDICINE

## 2022-08-04 PROCEDURE — G8417 CALC BMI ABV UP PARAM F/U: HCPCS | Performed by: INTERNAL MEDICINE

## 2022-08-04 PROCEDURE — G8427 DOCREV CUR MEDS BY ELIG CLIN: HCPCS | Performed by: INTERNAL MEDICINE

## 2022-08-04 PROCEDURE — 1123F ACP DISCUSS/DSCN MKR DOCD: CPT | Performed by: INTERNAL MEDICINE

## 2022-08-04 PROCEDURE — 1036F TOBACCO NON-USER: CPT | Performed by: INTERNAL MEDICINE

## 2022-08-04 PROCEDURE — 1090F PRES/ABSN URINE INCON ASSESS: CPT | Performed by: INTERNAL MEDICINE

## 2022-08-04 PROCEDURE — 3017F COLORECTAL CA SCREEN DOC REV: CPT | Performed by: INTERNAL MEDICINE

## 2022-08-04 PROCEDURE — G8399 PT W/DXA RESULTS DOCUMENT: HCPCS | Performed by: INTERNAL MEDICINE

## 2022-08-04 RX ORDER — BUPIVACAINE HYDROCHLORIDE 5 MG/ML
30 INJECTION, SOLUTION PERINEURAL ONCE
COMMUNITY
End: 2022-10-17 | Stop reason: ALTCHOICE

## 2022-08-04 NOTE — PATIENT INSTRUCTIONS
Monitor for atrial fibrillation with either Apple watch, [employer] device, or implantable loop recorder.

## 2022-08-09 ENCOUNTER — HOSPITAL ENCOUNTER (OUTPATIENT)
Dept: WOMENS IMAGING | Age: 72
Discharge: HOME | End: 2022-08-09
Payer: MEDICARE

## 2022-08-09 DIAGNOSIS — R92.8 ABNORMAL MAMMOGRAM OF RIGHT BREAST: ICD-10-CM

## 2022-08-09 PROCEDURE — G0279 TOMOSYNTHESIS, MAMMO: HCPCS

## 2022-08-15 ENCOUNTER — TELEPHONE (OUTPATIENT)
Dept: CARDIOLOGY CLINIC | Age: 72
End: 2022-08-15

## 2022-08-15 NOTE — TELEPHONE ENCOUNTER
PT called to see about her sleep study. First availability was in November. What should she do? Make the appt with Dr. Ashley Goins or Dr Magdalena Grey pulmonology?        Ileana Smith [phone]

## 2022-08-16 ENCOUNTER — TELEPHONE (OUTPATIENT)
Dept: CARDIOLOGY CLINIC | Age: 72
End: 2022-08-16

## 2022-08-16 NOTE — TELEPHONE ENCOUNTER
Pt states Dr Mariann Mason 'not taking new patients until Nov/2022' ; pt has not scheduled an appt yet. States Dr Flores Class office recommended pt call Dr Joycelyn Gonzalez at pulmonology, [facility] as she might be able to get earlier appt. Pt will try to schedule w/dr harrell and call our office back with any add'l questions.

## 2022-08-17 ENCOUNTER — TELEPHONE (OUTPATIENT)
Dept: CARDIOLOGY CLINIC | Age: 72
End: 2022-08-17

## 2022-08-17 NOTE — TELEPHONE ENCOUNTER
PHYSICIAN NEXT STEPS:  Review Only    CHIEF COMPLAINT:  Chief Complaint/Protocol Used: Chest Pain  Onset: 3 days ago      ASSESSMENT:  ? Onset: 3 days ago  ? Normal True  ? Location: \"Right chest, under the rib area\"  ? Onset: 3 days ago  ? PATTERN \"Does the pain come and go, or has it been constant since it started?\" \"Does it get worse with exertion?\" Come and go  ? Duration: Last a couple of minutes  ? Severity: 6/10 when having it.  ? Cause: Unsure  ? Other Symptoms: Nausea  -------------------------------------------------------    DISPOSITION:  Disposition Recommendation: Go to ED Now (or PCP triage)  Questions that led to disposition:  ? Taking a deep breath makes pain worse  Patient Directed To: Unspecified  Patient Intended Action: Go to Hospital / ED      CALL NOTES:  01/25/2020 at 10:37 AM by Alondra Barger  ? Patient states will call someone to take her to take her to [facility] ED now.     DISPOSITION OVERRIDE/PROVIDER CONSULT:  Disposition Override: N/A  Override Source: Unspecified  Consulted with PCP: No  Consulted with On-Call Physician: No    CALLER CONTACT INFO:  Name: Nina Hong (Self)  Phone 1: [phone] (Home Phone) - Preferred  Phone 2: [phone] (Work Phone)      ENCOUNTER STARTED:  01/25/20 10:25:44 AM  ENCOUNTER ASSIGNED TO/CLOSED BY:  Alondra Barger @ 01/25/20 10:38:07 AM      -------------------------------------------------------    CARE ADVICE given per Chest Pain guideline.  GO TO ED NOW (OR PCP TRIAGE):   * IF NO PCP TRIAGE: You need to be seen. Go to the ER/UCC at _____________ Hospital within the next hour. Leave as soon as you can.   * IF PCP TRIAGE REQUIRED: You may need to be seen. Your doctor will want to talk with you to decide what's best. I'll page him now. If you haven't heard from the on-call doctor within 30 minutes, go directly to the ER/UCC at _____________ Hospital.;   BRING MEDICINES:    * Please bring a list of your current medicines when you  Pt has appt w/ Neela Carson MD pulm/sleep medicine, 8/31, 1st available, hopes to move forward w/sleep study soon after. go to see the doctor.    * It is also a good idea to bring the pill bottles too. This will help the doctor to make certain you are taking the right medicines and the right dose.; CALL EMS IF:    * Severe difficulty breathing occurs    * Passes out or becomes too weak to stand    * You become worse.      UNDERSTANDS CARE ADVICE: Yes    AGREES WITH CARE ADVICE: Yes    WILL FOLLOW CARE ADVICE: Yes    -------------------------------------------------------

## 2022-08-30 RX ORDER — AMLODIPINE BESYLATE 5 MG/1
TABLET ORAL
Qty: 90 TABLET | Refills: 2 | Status: SHIPPED | OUTPATIENT
Start: 2022-08-30

## 2022-08-31 ENCOUNTER — OFFICE VISIT (OUTPATIENT)
Dept: SLEEP MEDICINE | Age: 72
End: 2022-08-31
Payer: MEDICARE

## 2022-08-31 VITALS
OXYGEN SATURATION: 99 % | DIASTOLIC BLOOD PRESSURE: 84 MMHG | BODY MASS INDEX: 29.77 KG/M2 | SYSTOLIC BLOOD PRESSURE: 140 MMHG | HEIGHT: 63 IN | HEART RATE: 60 BPM | WEIGHT: 168 LBS | TEMPERATURE: 98 F | RESPIRATION RATE: 18 BRPM

## 2022-08-31 DIAGNOSIS — R06.83 SNORING: ICD-10-CM

## 2022-08-31 DIAGNOSIS — I10 PRIMARY HYPERTENSION: ICD-10-CM

## 2022-08-31 DIAGNOSIS — G47.33 OSA (OBSTRUCTIVE SLEEP APNEA): Primary | ICD-10-CM

## 2022-08-31 PROCEDURE — 1123F ACP DISCUSS/DSCN MKR DOCD: CPT | Performed by: PSYCHIATRY & NEUROLOGY

## 2022-08-31 PROCEDURE — G8399 PT W/DXA RESULTS DOCUMENT: HCPCS | Performed by: PSYCHIATRY & NEUROLOGY

## 2022-08-31 PROCEDURE — 1090F PRES/ABSN URINE INCON ASSESS: CPT | Performed by: PSYCHIATRY & NEUROLOGY

## 2022-08-31 PROCEDURE — 1036F TOBACCO NON-USER: CPT | Performed by: PSYCHIATRY & NEUROLOGY

## 2022-08-31 PROCEDURE — 99204 OFFICE O/P NEW MOD 45 MIN: CPT | Performed by: PSYCHIATRY & NEUROLOGY

## 2022-08-31 PROCEDURE — G8427 DOCREV CUR MEDS BY ELIG CLIN: HCPCS | Performed by: PSYCHIATRY & NEUROLOGY

## 2022-08-31 PROCEDURE — 3017F COLORECTAL CA SCREEN DOC REV: CPT | Performed by: PSYCHIATRY & NEUROLOGY

## 2022-08-31 PROCEDURE — G8417 CALC BMI ABV UP PARAM F/U: HCPCS | Performed by: PSYCHIATRY & NEUROLOGY

## 2022-08-31 ASSESSMENT — SLEEP AND FATIGUE QUESTIONNAIRES
HOW LIKELY ARE YOU TO NOD OFF OR FALL ASLEEP WHILE SITTING INACTIVE IN A PUBLIC PLACE: 0
HOW LIKELY ARE YOU TO NOD OFF OR FALL ASLEEP WHILE SITTING AND READING: 0
NECK CIRCUMFERENCE (INCHES): 15
HOW LIKELY ARE YOU TO NOD OFF OR FALL ASLEEP WHILE LYING DOWN TO REST IN THE AFTERNOON WHEN CIRCUMSTANCES PERMIT: 1
HOW LIKELY ARE YOU TO NOD OFF OR FALL ASLEEP WHILE SITTING AND TALKING TO SOMEONE: 0
HOW LIKELY ARE YOU TO NOD OFF OR FALL ASLEEP WHILE WATCHING TV: 1
HOW LIKELY ARE YOU TO NOD OFF OR FALL ASLEEP WHILE SITTING QUIETLY AFTER LUNCH WITHOUT ALCOHOL: 0
HOW LIKELY ARE YOU TO NOD OFF OR FALL ASLEEP WHEN YOU ARE A PASSENGER IN A CAR FOR AN HOUR WITHOUT A BREAK: 0
HOW LIKELY ARE YOU TO NOD OFF OR FALL ASLEEP IN A CAR, WHILE STOPPED FOR A FEW MINUTES IN TRAFFIC: 0
ESS TOTAL SCORE: 2

## 2022-08-31 ASSESSMENT — ENCOUNTER SYMPTOMS
EYES NEGATIVE: 1
COUGH: 1
GASTROINTESTINAL NEGATIVE: 1
ALLERGIC/IMMUNOLOGIC NEGATIVE: 1

## 2022-08-31 NOTE — PROGRESS NOTES
MD GRACIE Sutherland Board Certified in Sleep Medicine  Certified [facility] Sleep Medicine  Board Certified in Neurology [address]  [address] [facility] [address] [facility], [address] [facility], [address]           [address] [facility] SLEEP MEDICINE [address] [address] [phone]    Subjective:     Patient ID: Melani Acevedo is a 70 y.o. female. Chief Complaint   Patient presents with    Establish Care    Snoring       HPI:        Melani Acevedo is a 70 y.o. female referred by Dr. Coty Pearson for a sleep evaluation. She complains of snoring, tossing and turning but she denies snorting, choking, periods of not breathing, knees buckling with laughing, completely or partially paralyzed while falling asleep or waking up, difficulty falling asleep once awakened, feels sleepy during the day, take naps during the day, noisy environment, uncomfortable room temperature, uncomfortable bedding. Symptoms began several years ago, unchanged since that time. The patient's bed-partner confirmed the snoring without the stopped breathing at night  SLEEP SCHEDULE: Goes to bed around 12:30 AM in the weekdays and 12:30 AM in the weekends. It usually takes the patient 30 minutes to fall asleep. The patient gets up 1-2 per night to go to the bathroom. The Patient finally gets up at 8:30 AM during the weekdays and 8:30 AM in the weekends. patient wakes up with dry mouth. The patient has restless sleep with frequent arousals in addition to the Patient has significant daytime sleepiness. The Patient scored [place] Sleepiness Score: 2 on [place] Sleepiness Scale ( more than 10 is indicative of daytime sleepiness)and 14 in fatigue scale ( more than 36 is indicative of daytime fatigue). The patient takes no naps.     Previous evaluation and (MOBIC) 15 MG tablet TAKE 1 TABLET BY MOUTH EVERY DAY AS NEEDED FOR PAIN 1/13/22  Yes Rony Burks MD   meclizine (ANTIVERT) 25 MG tablet TAKE 1 TABLET BY MOUTH THREE TIMES DAILY AS NEEDED FOR DIZZINESS 6/29/21  Yes Yesi Malik MD   Calcium Carbonate Antacid (TUMS E-X PO) Take by mouth   Yes Historical Provider, MD   Cholecalciferol (VITAMIN D-3 PO) Take by mouth   Yes Historical Provider, MD   timolol (TIMOPTIC) 0.5 % ophthalmic solution  4/13/16  Yes Historical Provider, MD   therapeutic multivitamin-minerals (THERAGRAN-M) tablet Take 1 tablet by mouth daily    Yes Historical Provider, MD   omeprazole (PRILOSEC OTC) 20 MG tablet Take 20 mg by mouth 2 times daily.  5/13/10  Yes Historical Provider, MD   clobetasol (TEMOVATE) 0.05 % ointment Apply BID for up to 2 weeks torso, arms, scalp avoid face 10/27/21   Lola Orona MD       Allergies as of 08/31/2022 - Fully Reviewed 08/31/2022   Allergen Reaction Noted    Nsaids Other (See Comments) 05/26/2016       Patient Active Problem List   Diagnosis    Hypertension    Peptic ulcer    Hyperlipidemia    Vertigo    Ganglion of left wrist    Neck arthritis C5-C6-C7    Partial tear of right rotator cuff    Chronic left shoulder pain    Osteopenia    Arthritis    Degenerative disc disease, lumbar    Hoarseness of voice    Hair loss       Past Medical History:   Diagnosis Date    Cancer ([facility] [address]     MELANOMA    Dysphagia, unspecified(787.20) 5/13/2010    Essential hypertension, benign 5/13/2010    Glaucoma     Kidney stones     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 Nena Almodovar    COLONOSCOPY  2003    normal - dr Eduin Felipe 10/9/2018    Willaim Achilles performed by Ashley Wood MD at [place] [address] HEMORRHOID SURGERY      SKIN BIOPSY      TONSILLECTOMY      UPPER GASTROINTESTINAL ENDOSCOPY  10/09/2018    hemant    UPPER GASTROINTESTINAL ENDOSCOPY N/A 10/9/2018    EGD DIAGNOSTIC ONLY performed by Mitchell Brown MD at [facility] ENDOSCOPY       Family History   Problem Relation Age of Onset    Stroke Mother     Heart Disease Father     Diabetes Brother     Kidney stones Brother     Breast Cancer Maternal Aunt     Breast Cancer Paternal Aunt        Review of Systems   Constitutional: Negative. HENT: Negative. Eyes: Negative. Respiratory:  Positive for cough. Cardiovascular:  Positive for palpitations. Gastrointestinal: Negative. Endocrine: Negative. Genitourinary:  Positive for frequency. Musculoskeletal:  Positive for arthralgias. Allergic/Immunologic: Negative. Neurological: Negative. Hematological: Negative. Psychiatric/Behavioral: Negative. Objective:     Vitals:  Weight BMI Neck circumference    Wt Readings from Last 3 Encounters:   08/31/22 168 lb (76.2 kg)   08/04/22 168 lb 9.6 oz (76.5 kg)   07/08/22 165 lb (74.8 kg)    Body mass index is 29.76 kg/m². Neck circumference (Inches): 15     BP HR SaO2   BP Readings from Last 3 Encounters:   08/31/22 (!) 140/84   08/04/22 118/80   05/10/22 130/70    Pulse Readings from Last 3 Encounters:   08/31/22 60   08/04/22 60   10/18/21 69    SpO2 Readings from Last 3 Encounters:   08/31/22 99%   10/01/19 98%   10/09/18 (!) 87%        The mandibular molar Class :   [x]1 []2 []3      Mallampati I Airway Classification:   []1 []2 []3 [x]4        Physical Exam  Vitals and nursing note reviewed. Constitutional:       Appearance: Normal appearance. HENT:      Head: Atraumatic. Nose: Nose normal.      Mouth/Throat:      Comments: Mallampati class 4, no retrognathia or hypognathia , normal airflow in bilateral nostrils, no septum deviation , oropharynx is normal,  no tonsils enlargement.    Eyes:      Extraocular Movements: Extraocular movements intact. Cardiovascular:      Rate and Rhythm: Normal rate and regular rhythm. Pulmonary:      Effort: Pulmonary effort is normal.      Breath sounds: Normal breath sounds. Musculoskeletal:      Right lower leg: No edema. Left lower leg: No edema. Neurological:      Mental Status: Mental status is at baseline. Psychiatric:         Mood and Affect: Mood normal.       Assessment:    Obstructive sleep apnea especially with snoring, daytime sleepiness, Mallampati class of 4 . Diagnosis Orders   1. ASHER (obstructive sleep apnea)  Baseline Diagnostic Sleep Study    Sleep Study with PAP Titration      2. Primary hypertension        3. Snoring          Plan:     Patient was counseled about the pathophysiology of obstructive sleep apnea syndrome and the methods for evaluating its presence and severity. Patient was counseled to avoid driving and other potentially hazardous circumstances if the patient is experiencing excessive sleepiness. Treatment considerations include the use of nasal CPAP, weight loss, In the meantime, the patient should be cautioned to avoid the use of alcohol or other depressant medications because of potential for increasing the duration and severity of apnea and cautioned regarding driving or operating and dangerous equipment if the patient is experiencing daytime sleepiness. .  Most likely treating the ASHER will have positive impact on HTN control. Orders Placed This Encounter   Procedures    Baseline Diagnostic Sleep Study    Sleep Study with PAP Titration         Return for F/U between 31 and 90 days from the recieving CPAP.     Karen Sun MD  Medical Director - [facility]

## 2022-09-27 ENCOUNTER — HOSPITAL ENCOUNTER (OUTPATIENT)
Dept: SLEEP CENTER | Age: 72
Discharge: HOME | End: 2022-09-27
Payer: MEDICARE

## 2022-09-27 DIAGNOSIS — G47.33 OSA (OBSTRUCTIVE SLEEP APNEA): ICD-10-CM

## 2022-09-27 PROCEDURE — 95810 POLYSOM 6/> YRS 4/> PARAM: CPT

## 2022-09-28 PROBLEM — G47.33 OSA (OBSTRUCTIVE SLEEP APNEA): Status: ACTIVE | Noted: 2022-09-28

## 2022-09-29 ENCOUNTER — TELEPHONE (OUTPATIENT)
Dept: PULMONOLOGY | Age: 72
End: 2022-09-29

## 2022-09-29 PROCEDURE — 95810 POLYSOM 6/> YRS 4/> PARAM: CPT | Performed by: PSYCHIATRY & NEUROLOGY

## 2022-09-29 NOTE — TELEPHONE ENCOUNTER
PSG Sleep study showed mild ASHER. AHI was 6.8  per hr. And O2 Desaturations to 62%. Dr Belinda Egan: Follow up with the patient's sleep physician to discuss results  A trial of CPAP titration is recommended with supplemental oxygen per protocol if needed. Avoid sedatives, alcohol and caffeinated drinks at bedtime. Avoid driving while sleepy. The patient has been notified of this information and all questions answered.   Transferred to sleep lab

## 2022-10-04 ENCOUNTER — HOSPITAL ENCOUNTER (OUTPATIENT)
Dept: SLEEP CENTER | Age: 72
Discharge: HOME | End: 2022-10-04
Payer: MEDICARE

## 2022-10-04 DIAGNOSIS — G47.33 OSA (OBSTRUCTIVE SLEEP APNEA): ICD-10-CM

## 2022-10-04 PROCEDURE — 95811 POLYSOM 6/>YRS CPAP 4/> PARM: CPT

## 2022-10-05 ENCOUNTER — TELEPHONE (OUTPATIENT)
Dept: PULMONOLOGY | Age: 72
End: 2022-10-05

## 2022-10-05 PROCEDURE — 95811 POLYSOM 6/>YRS CPAP 4/> PARM: CPT | Performed by: PSYCHIATRY & NEUROLOGY

## 2022-10-05 NOTE — PROGRESS NOTES
[facility]         [facility] 1950  [x] [facility]     [] [address]      [] [facility]     []Genaro    [] Nannette Zambrano  [] [facility]  [] [facility]   [] Other:  Diagnosis: [x] ASHER (G47.33) [] CSA (G47.31) [] Apnea (G47.30)   Length of Need: [] 12 Months [x] 99 Months [] Other:    Machine (LILIANA!):  [x] ResMed AirSense     Auto [] Other:     [x]  CPAP () [] Bilevel ()   Mode: [x] Auto [] Spontaneous    Mode: [] Auto [] Spontaneous           8 cm                 Comfort Settings:   - Ramp Pressure: 5 cmH2O                                        - Ramp time: 15 min                                     -  Flex/EPR - 3 full time                                    - For ResMed Bilevel (TiMax-4 sec   TiMin- 0.2 sec)     Humidifier: [x] Heated ()        [x] Water chamber replacement ()/ 1 per 6 months        Mask:  Please always start with the mask the patient used during the titraion   [x] Nasal () /1 per 3 months [x] Full Face () /1 per 3 months   [x] Patient choice -Size and fit mask [x] Patient Choice - Size and fit mask   [] Dispense:   small Airfit N20  [] Dispense:    [x] Headgear () / 1 per 3 months [x] Headgear () / 1 per 3 months   [x] Replacement Nasal Cushion ()/2 per month [x] Interface Replacement ()/1 per month   [x] Replacement Nasal Pillows ()/2 per month         Tubing: [x] Heated ()/1 per 3 months    [] Standard ()/1 per 3 months [] Other:           Filters: [x] Non-disposable ()/1 per 6 months     [x] Ultra-Fine, Disposable ()/2 per month        Miscellaneous: [x] Chin Strap ()/ 1 per 6 months [] O2 bleed-in:       LPM   [] Oximetry on CPAP/Bilevel []  Other:          Start Order Date: 10/05/22    MEDICAL JUSTIFICATION:  I, the undersigned, certify that the above prescribed supplies are medically necessary for this patients wellbeing.   In my opinion, the supplies are both reasonable and necessary in reference to

## 2022-10-05 NOTE — TELEPHONE ENCOUNTER
PSG Sleep study showed mild ASHER. AHI was 6.8  per hr. And O2 Desaturations to 62%. Dr Luke Dominguez:    A trial of CPAP titration is recommended with supplemental oxygen per protocol if needed. LMOM for pt to cb for sleep study results. Order for cpap sent to [facility].

## 2022-10-10 ENCOUNTER — TELEPHONE (OUTPATIENT)
Dept: INTERNAL MEDICINE CLINIC | Age: 72
End: 2022-10-10

## 2022-10-10 DIAGNOSIS — M85.80 OSTEOPENIA, UNSPECIFIED LOCATION: ICD-10-CM

## 2022-10-10 DIAGNOSIS — E78.5 HYPERLIPIDEMIA, UNSPECIFIED HYPERLIPIDEMIA TYPE: ICD-10-CM

## 2022-10-10 DIAGNOSIS — I10 PRIMARY HYPERTENSION: Primary | ICD-10-CM

## 2022-10-10 NOTE — TELEPHONE ENCOUNTER
----- Message from Abbey Oneil sent at 10/10/2022 11:43 AM EDT -----  Subject: Referral Request    Reason for referral request? blood work for physical  Provider patient wants to be referred to(if known):     Provider Phone Number(if known):     Additional Information for Provider?   ---------------------------------------------------------------------------  --------------  [address]    [phone]; OK to leave message on voicemail  ---------------------------------------------------------------------------  --------------

## 2022-10-12 DIAGNOSIS — M85.80 OSTEOPENIA, UNSPECIFIED LOCATION: ICD-10-CM

## 2022-10-12 DIAGNOSIS — E78.5 HYPERLIPIDEMIA, UNSPECIFIED HYPERLIPIDEMIA TYPE: ICD-10-CM

## 2022-10-12 DIAGNOSIS — I10 PRIMARY HYPERTENSION: ICD-10-CM

## 2022-10-12 LAB
A/G RATIO: 2.2 (ref 1.1–2.2)
ALBUMIN SERPL-MCNC: 4.3 G/DL (ref 3.4–5)
ALP BLD-CCNC: 89 U/L (ref 40–129)
ALT SERPL-CCNC: 12 U/L (ref 10–40)
ANION GAP SERPL CALCULATED.3IONS-SCNC: 10 MMOL/L (ref 3–16)
AST SERPL-CCNC: 17 U/L (ref 15–37)
BASOPHILS ABSOLUTE: 0.1 K/UL (ref 0–0.2)
BASOPHILS RELATIVE PERCENT: 1.3 %
BILIRUB SERPL-MCNC: <0.2 MG/DL (ref 0–1)
BUN BLDV-MCNC: 15 MG/DL (ref 7–20)
CALCIUM SERPL-MCNC: 9.6 MG/DL (ref 8.3–10.6)
CHLORIDE BLD-SCNC: 103 MMOL/L (ref 99–110)
CHOLESTEROL, TOTAL: 235 MG/DL (ref 0–199)
CO2: 30 MMOL/L (ref 21–32)
CREAT SERPL-MCNC: 0.7 MG/DL (ref 0.6–1.2)
EOSINOPHILS ABSOLUTE: 0.3 K/UL (ref 0–0.6)
EOSINOPHILS RELATIVE PERCENT: 5.6 %
GFR AFRICAN AMERICAN: >60
GFR NON-AFRICAN AMERICAN: >60
GLUCOSE BLD-MCNC: 102 MG/DL (ref 70–99)
HCT VFR BLD CALC: 38.6 % (ref 36–48)
HDLC SERPL-MCNC: 54 MG/DL (ref 40–60)
HEMOGLOBIN: 13.1 G/DL (ref 12–16)
LDL CHOLESTEROL CALCULATED: 144 MG/DL
LYMPHOCYTES ABSOLUTE: 1.7 K/UL (ref 1–5.1)
LYMPHOCYTES RELATIVE PERCENT: 36.4 %
MCH RBC QN AUTO: 32.3 PG (ref 26–34)
MCHC RBC AUTO-ENTMCNC: 33.9 G/DL (ref 31–36)
MCV RBC AUTO: 95.2 FL (ref 80–100)
MONOCYTES ABSOLUTE: 0.4 K/UL (ref 0–1.3)
MONOCYTES RELATIVE PERCENT: 9.1 %
NEUTROPHILS ABSOLUTE: 2.2 K/UL (ref 1.7–7.7)
NEUTROPHILS RELATIVE PERCENT: 47.6 %
PDW BLD-RTO: 13.5 % (ref 12.4–15.4)
PLATELET # BLD: 266 K/UL (ref 135–450)
PMV BLD AUTO: 9.1 FL (ref 5–10.5)
POTASSIUM SERPL-SCNC: 4 MMOL/L (ref 3.5–5.1)
RBC # BLD: 4.05 M/UL (ref 4–5.2)
SODIUM BLD-SCNC: 143 MMOL/L (ref 136–145)
TOTAL PROTEIN: 6.3 G/DL (ref 6.4–8.2)
TRIGL SERPL-MCNC: 185 MG/DL (ref 0–150)
TSH REFLEX: 3.3 UIU/ML (ref 0.27–4.2)
VITAMIN D 25-HYDROXY: 58.1 NG/ML
VLDLC SERPL CALC-MCNC: 37 MG/DL
WBC # BLD: 4.6 K/UL (ref 4–11)

## 2022-10-17 ENCOUNTER — OFFICE VISIT (OUTPATIENT)
Dept: INTERNAL MEDICINE CLINIC | Age: 72
End: 2022-10-17
Payer: MEDICARE

## 2022-10-17 VITALS
SYSTOLIC BLOOD PRESSURE: 136 MMHG | WEIGHT: 173 LBS | DIASTOLIC BLOOD PRESSURE: 72 MMHG | HEIGHT: 62 IN | BODY MASS INDEX: 31.83 KG/M2

## 2022-10-17 DIAGNOSIS — Z23 NEED FOR INFLUENZA VACCINATION: ICD-10-CM

## 2022-10-17 DIAGNOSIS — Z00.00 MEDICARE ANNUAL WELLNESS VISIT, SUBSEQUENT: Primary | ICD-10-CM

## 2022-10-17 DIAGNOSIS — I10 PRIMARY HYPERTENSION: ICD-10-CM

## 2022-10-17 DIAGNOSIS — R00.2 PALPITATION: ICD-10-CM

## 2022-10-17 DIAGNOSIS — M19.90 ARTHRITIS: ICD-10-CM

## 2022-10-17 DIAGNOSIS — E78.5 HYPERLIPIDEMIA, UNSPECIFIED HYPERLIPIDEMIA TYPE: ICD-10-CM

## 2022-10-17 PROCEDURE — G8484 FLU IMMUNIZE NO ADMIN: HCPCS | Performed by: INTERNAL MEDICINE

## 2022-10-17 PROCEDURE — 1123F ACP DISCUSS/DSCN MKR DOCD: CPT | Performed by: INTERNAL MEDICINE

## 2022-10-17 PROCEDURE — G0439 PPPS, SUBSEQ VISIT: HCPCS | Performed by: INTERNAL MEDICINE

## 2022-10-17 PROCEDURE — G0008 ADMIN INFLUENZA VIRUS VAC: HCPCS | Performed by: INTERNAL MEDICINE

## 2022-10-17 PROCEDURE — 90694 VACC AIIV4 NO PRSRV 0.5ML IM: CPT | Performed by: INTERNAL MEDICINE

## 2022-10-17 PROCEDURE — 3017F COLORECTAL CA SCREEN DOC REV: CPT | Performed by: INTERNAL MEDICINE

## 2022-10-17 RX ORDER — CHOLECALCIFEROL (VITAMIN D3) 125 MCG
5 CAPSULE ORAL NIGHTLY
COMMUNITY

## 2022-10-17 RX ORDER — ATORVASTATIN CALCIUM 10 MG/1
10 TABLET, FILM COATED ORAL DAILY
Qty: 90 TABLET | Refills: 1 | Status: SHIPPED | OUTPATIENT
Start: 2022-10-17

## 2022-10-17 SDOH — ECONOMIC STABILITY: FOOD INSECURITY: WITHIN THE PAST 12 MONTHS, THE FOOD YOU BOUGHT JUST DIDN'T LAST AND YOU DIDN'T HAVE MONEY TO GET MORE.: NEVER TRUE

## 2022-10-17 SDOH — ECONOMIC STABILITY: FOOD INSECURITY: WITHIN THE PAST 12 MONTHS, YOU WORRIED THAT YOUR FOOD WOULD RUN OUT BEFORE YOU GOT MONEY TO BUY MORE.: NEVER TRUE

## 2022-10-17 ASSESSMENT — PATIENT HEALTH QUESTIONNAIRE - PHQ9
SUM OF ALL RESPONSES TO PHQ9 QUESTIONS 1 & 2: 0
SUM OF ALL RESPONSES TO PHQ QUESTIONS 1-9: 0
SUM OF ALL RESPONSES TO PHQ QUESTIONS 1-9: 0
1. LITTLE INTEREST OR PLEASURE IN DOING THINGS: 0
2. FEELING DOWN, DEPRESSED OR HOPELESS: 0
SUM OF ALL RESPONSES TO PHQ QUESTIONS 1-9: 0
SUM OF ALL RESPONSES TO PHQ QUESTIONS 1-9: 0

## 2022-10-17 ASSESSMENT — SOCIAL DETERMINANTS OF HEALTH (SDOH): HOW HARD IS IT FOR YOU TO PAY FOR THE VERY BASICS LIKE FOOD, HOUSING, MEDICAL CARE, AND HEATING?: NOT HARD AT ALL

## 2022-10-17 ASSESSMENT — LIFESTYLE VARIABLES
HOW OFTEN DO YOU HAVE A DRINK CONTAINING ALCOHOL: MONTHLY OR LESS
HOW MANY STANDARD DRINKS CONTAINING ALCOHOL DO YOU HAVE ON A TYPICAL DAY: 1 OR 2

## 2022-10-17 NOTE — PROGRESS NOTES
Medicare Annual Wellness Visit    Zuri Garcia is here for Medicare AWV    Assessment & Plan   Medicare annual wellness visit, subsequent  Palpitation   -We had a long discussion about this. The PVCs certainly can explain all of her symptoms, though she has anxiety about A. fib and it is difficult to rule out that she could have intermittent episodes. She had discussed some monitoring options with the cardiologist, she does not have an iPhone so an apple watch would be quite expensive for her, her current mobile device is not compatible with Keoghs though that would be more easily changed. She may discuss a loop recorder which she has follow-up with cardiologist in February. Arthritis   -Discussed increasing exercise, continue meloxicam as needed  Primary hypertension   -Stable, continue amlodipine 5 mg daily, losartan HCTZ 100 instructed 5 mg daily  Hyperlipidemia, unspecified hyperlipidemia type  -Cholesterol remains moderately elevated. Calculated ASCVD risk score, is about 16%. We will start atorvastatin 10 mg once daily and recheck in 6 months  -     Comprehensive Metabolic Panel; Future  -     Lipid Panel; Future  Need for influenza vaccination  -     Influenza, FLUAD, (age 72 y+), IM, Preservative Free, 0.5 mL    Recommendations for Preventive Services Due: see orders and patient instructions/AVS.  Recommended screening schedule for the next 5-10 years is provided to the patient in written form: see Patient Instructions/AVS.     Return for Medicare Annual Wellness Visit in 1 year, 6 mo cholesterol. Subjective   The following acute and/or chronic problems were also addressed today:    Her biggest source of anxiety is the issue with palpitations. She is worried about A. fib because her brother had passed away from A. fib in the setting of osteomyelitis. She has intermittent palpitations, the Holter monitor had shown PVCs that correlated with symptoms.   She saw the cardiologist who recommended sleep evaluation, sleep study did show sleep apnea so she is waiting to start on the CPAP. Patient's complete Health Risk Assessment and screening values have been reviewed and are found in Flowsheets. The following problems were reviewed today and where indicated follow up appointments were made and/or referrals ordered. Positive Risk Factor Screenings with Interventions:              Health Habits/Nutrition:  Physical Activity: Inactive    Days of Exercise per Week: 0 days    Minutes of Exercise per Session: 0 min     Have you lost any weight without trying in the past 3 months?: No  Body mass index: (!) 31.64  Have you seen the dentist within the past year?: Yes  Health Habits/Nutrition Interventions:  Inadequate physical activity: Limited by her arthritis pain, she will continue joining the [facility] with Silver sneakers, water aerobics may be a good option for her. Objective   Vitals:    10/17/22 0956   BP: 136/72   Site: Left Upper Arm   Weight: 173 lb (78.5 kg)   Height: 5' 2\" (1.575 m)      Body mass index is 31.64 kg/m². Physical Exam  Vitals reviewed. Constitutional:       General: She is not in acute distress. Appearance: Normal appearance. She is well-developed. HENT:      Head: Normocephalic and atraumatic. Right Ear: Tympanic membrane, ear canal and external ear normal.      Left Ear: Tympanic membrane, ear canal and external ear normal.   Eyes:      General: No scleral icterus. Conjunctiva/sclera: Conjunctivae normal.   Neck:      Thyroid: No thyroid mass, thyromegaly or thyroid tenderness. Vascular: No carotid bruit. Cardiovascular:      Rate and Rhythm: Normal rate and regular rhythm. Heart sounds: Normal heart sounds. Pulmonary:      Effort: Pulmonary effort is normal. No respiratory distress. Breath sounds: Normal breath sounds. Abdominal:      General: Abdomen is flat. Bowel sounds are normal. There is no distension. Palpations: Abdomen is soft. There is no mass. Tenderness: There is no abdominal tenderness. Hernia: No hernia is present. Musculoskeletal:      Cervical back: Neck supple. Right lower leg: No edema. Left lower leg: No edema. Lymphadenopathy:      Cervical: No cervical adenopathy. Skin:     General: Skin is warm and dry. Neurological:      General: No focal deficit present. Mental Status: She is alert. Psychiatric:         Mood and Affect: Mood normal.         Behavior: Behavior normal.         Thought Content: Thought content normal.         Judgment: Judgment normal.             Allergies   Allergen Reactions    Nsaids Other (See Comments)     History of GI bleed     Prior to Visit Medications    Medication Sig Taking? Authorizing Provider   melatonin 5 MG TABS tablet Take 5 mg by mouth nightly Yes Historical Provider, MD   Multiple Vitamins-Minerals (PRESERVISION AREDS 2 PO) Take by mouth Yes Historical Provider, MD   atorvastatin (LIPITOR) 10 MG tablet Take 1 tablet by mouth daily Yes Lit Morleand MD   amLODIPine (NORVASC) 5 MG tablet TAKE 1 TABLET BY MOUTH EVERY DAY Yes Lit Moreland MD   losartan-hydroCHLOROthiazide (HYZAAR) 100-12.5 MG per tablet TAKE 1 TABLET BY MOUTH EVERY DAY Yes Lit Moreland MD   meloxicam (MOBIC) 15 MG tablet TAKE 1 TABLET BY MOUTH EVERY DAY AS NEEDED FOR PAIN Yes Lit Moreland MD   Calcium Carbonate Antacid (TUMS E-X PO) Take by mouth Yes Historical Provider, MD   Cholecalciferol (VITAMIN D-3 PO) Take by mouth Yes Historical Provider, MD   timolol (TIMOPTIC) 0.5 % ophthalmic solution  Yes Historical Provider, MD   therapeutic multivitamin-minerals (THERAGRAN-M) tablet Take 1 tablet by mouth daily  Yes Historical Provider, MD   omeprazole (PRILOSEC OTC) 20 MG tablet Take 20 mg by mouth 2 times daily.  Yes Historical Provider, MD   clobetasol (TEMOVATE) 0.05 % ointment Apply BID for up to 2 weeks torso, arms, scalp avoid face  Joel Wong MD [facility] (Including outside providers/suppliers regularly involved in providing care):   Patient Care Team:  Kang Thomas MD as PCP - General (Internal Medicine)  Kang Thomas MD as PCP - [address] [place] [address] Provider  Tessa Hartman MD as Consulting Physician (Ophthalmology)  Dex Joseph MD as Surgeon (Orthopedic Surgery)  Pam Mata as Consulting Physician (Physician Assistant)     Reviewed and updated this visit:  Tobacco  Allergies  Meds  Med Hx  Surg Hx  Soc Hx  Fam Hx

## 2022-10-17 NOTE — PATIENT INSTRUCTIONS
Personalized Preventive Plan for Abdirashid Anderson - 10/17/2022  Medicare offers a range of preventive health benefits. Some of the tests and screenings are paid in full while other may be subject to a deductible, co-insurance, and/or copay. Some of these benefits include a comprehensive review of your medical history including lifestyle, illnesses that may run in your family, and various assessments and screenings as appropriate. After reviewing your medical record and screening and assessments performed today your provider may have ordered immunizations, labs, imaging, and/or referrals for you. A list of these orders (if applicable) as well as your Preventive Care list are included within your After Visit Summary for your review. Other Preventive Recommendations:    A preventive eye exam performed by an eye specialist is recommended every 1-2 years to screen for glaucoma; cataracts, macular degeneration, and other eye disorders. A preventive dental visit is recommended every 6 months. Try to get at least 150 minutes of exercise per week or 10,000 steps per day on a pedometer . Order or download the FREE \"Exercise & Physical Activity: Your Everyday Guide\" from [employer]. Call 9-262.215.8090 or search [employer] online. You need 3924-3677 mg of calcium and 9714-0840 IU of vitamin D per day. It is possible to meet your calcium requirement with diet alone, but a vitamin D supplement is usually necessary to meet this goal.  When exposed to the sun, use a sunscreen that protects against both UVA and UVB radiation with an SPF of 30 or greater. Reapply every 2 to 3 hours or after sweating, drying off with a towel, or swimming. Always wear a seat belt when traveling in a car. Always wear a helmet when riding a bicycle or motorcycle.

## 2022-11-07 ENCOUNTER — TELEPHONE (OUTPATIENT)
Dept: CARDIOLOGY CLINIC | Age: 72
End: 2022-11-07

## 2022-11-07 NOTE — TELEPHONE ENCOUNTER
Spoke with pt at great length. Reviewed options for monitoring for AF: Apple watch, Daviddia, ILR. She has an Android and not interested in switching to Apple. At this poin, she is not excited about having an invasive procedure (ILR). Her symptoms (hollow feeling in chest) are sporadic and can last up to 30 min (used to be more brief), because of this Anthony Bentley may be helpful as she is symptomatic and episodes last long enough to get out the device to record. Pt appreciative of the guidance. She is still considering her options.

## 2022-11-07 NOTE — TELEPHONE ENCOUNTER
The patient called and would like to know a good watch to get to monitor her HR. Please call the patient at [phone] to advise.

## 2022-12-09 RX ORDER — LOSARTAN POTASSIUM AND HYDROCHLOROTHIAZIDE 12.5; 1 MG/1; MG/1
TABLET ORAL
Qty: 90 TABLET | Refills: 1 | Status: SHIPPED | OUTPATIENT
Start: 2022-12-09

## 2023-01-09 ENCOUNTER — OFFICE VISIT (OUTPATIENT)
Dept: SLEEP MEDICINE | Age: 73
End: 2023-01-09
Payer: MEDICARE

## 2023-01-09 VITALS
BODY MASS INDEX: 30.69 KG/M2 | HEIGHT: 63 IN | OXYGEN SATURATION: 98 % | WEIGHT: 173.2 LBS | RESPIRATION RATE: 20 BRPM | SYSTOLIC BLOOD PRESSURE: 120 MMHG | DIASTOLIC BLOOD PRESSURE: 80 MMHG | HEART RATE: 59 BPM | TEMPERATURE: 97.1 F

## 2023-01-09 DIAGNOSIS — G47.33 OSA ON CPAP: Primary | ICD-10-CM

## 2023-01-09 DIAGNOSIS — Z99.89 OSA ON CPAP: Primary | ICD-10-CM

## 2023-01-09 DIAGNOSIS — R09.81 NASAL CONGESTION: ICD-10-CM

## 2023-01-09 DIAGNOSIS — I10 PRIMARY HYPERTENSION: ICD-10-CM

## 2023-01-09 DIAGNOSIS — Z99.89 DEPENDENCE ON OTHER ENABLING MACHINES AND DEVICES: ICD-10-CM

## 2023-01-09 PROCEDURE — 1123F ACP DISCUSS/DSCN MKR DOCD: CPT | Performed by: PSYCHIATRY & NEUROLOGY

## 2023-01-09 PROCEDURE — G8427 DOCREV CUR MEDS BY ELIG CLIN: HCPCS | Performed by: PSYCHIATRY & NEUROLOGY

## 2023-01-09 PROCEDURE — 3079F DIAST BP 80-89 MM HG: CPT | Performed by: PSYCHIATRY & NEUROLOGY

## 2023-01-09 PROCEDURE — G8399 PT W/DXA RESULTS DOCUMENT: HCPCS | Performed by: PSYCHIATRY & NEUROLOGY

## 2023-01-09 PROCEDURE — G8484 FLU IMMUNIZE NO ADMIN: HCPCS | Performed by: PSYCHIATRY & NEUROLOGY

## 2023-01-09 PROCEDURE — G8417 CALC BMI ABV UP PARAM F/U: HCPCS | Performed by: PSYCHIATRY & NEUROLOGY

## 2023-01-09 PROCEDURE — 1090F PRES/ABSN URINE INCON ASSESS: CPT | Performed by: PSYCHIATRY & NEUROLOGY

## 2023-01-09 PROCEDURE — 3017F COLORECTAL CA SCREEN DOC REV: CPT | Performed by: PSYCHIATRY & NEUROLOGY

## 2023-01-09 PROCEDURE — 3074F SYST BP LT 130 MM HG: CPT | Performed by: PSYCHIATRY & NEUROLOGY

## 2023-01-09 PROCEDURE — 1036F TOBACCO NON-USER: CPT | Performed by: PSYCHIATRY & NEUROLOGY

## 2023-01-09 PROCEDURE — 99214 OFFICE O/P EST MOD 30 MIN: CPT | Performed by: PSYCHIATRY & NEUROLOGY

## 2023-01-09 RX ORDER — FLUTICASONE PROPIONATE 50 MCG
SPRAY, SUSPENSION (ML) NASAL
Qty: 48 G | Refills: 5 | Status: SHIPPED | OUTPATIENT
Start: 2023-01-09

## 2023-01-09 RX ORDER — FLUTICASONE PROPIONATE 50 MCG
2 SPRAY, SUSPENSION (ML) NASAL DAILY
Qty: 16 G | Refills: 5 | Status: SHIPPED | OUTPATIENT
Start: 2023-01-09 | End: 2023-01-09

## 2023-01-09 ASSESSMENT — SLEEP AND FATIGUE QUESTIONNAIRES
HOW LIKELY ARE YOU TO NOD OFF OR FALL ASLEEP WHILE LYING DOWN TO REST IN THE AFTERNOON WHEN CIRCUMSTANCES PERMIT: 1
HOW LIKELY ARE YOU TO NOD OFF OR FALL ASLEEP IN A CAR, WHILE STOPPED FOR A FEW MINUTES IN TRAFFIC: 0
HOW LIKELY ARE YOU TO NOD OFF OR FALL ASLEEP WHEN YOU ARE A PASSENGER IN A CAR FOR AN HOUR WITHOUT A BREAK: 0
HOW LIKELY ARE YOU TO NOD OFF OR FALL ASLEEP WHILE WATCHING TV: 1
HOW LIKELY ARE YOU TO NOD OFF OR FALL ASLEEP WHILE SITTING AND TALKING TO SOMEONE: 0
HOW LIKELY ARE YOU TO NOD OFF OR FALL ASLEEP WHILE SITTING QUIETLY AFTER LUNCH WITHOUT ALCOHOL: 0
HOW LIKELY ARE YOU TO NOD OFF OR FALL ASLEEP WHILE SITTING INACTIVE IN A PUBLIC PLACE: 0
ESS TOTAL SCORE: 2
HOW LIKELY ARE YOU TO NOD OFF OR FALL ASLEEP WHILE SITTING AND READING: 0

## 2023-01-09 NOTE — PROGRESS NOTES
MD GRACIE Hoffman Board Certified in Sleep Medicine  Certified in [address] [place] [address] Certified in Neurology [address] [facility]  Mitz[address],  Tacho Knight 67  [phone]   [address] [place] [address], [address] [place] [address]                      2230 [facility]  [address] [address] [phone]    Subjective:     Patient ID: Tesfaye Cast is a 67 y.o. female. Chief Complaint   Patient presents with    Follow-up    Sleep Apnea       HPI:        Tesfaye Cast is a 67 y.o. female was seen today as a follow for obstructive sleep apnea. The patient underwent comprehensive polysomnogram on 09/27/2022, the overnight registration revealed mild obstructive sleep apnea with apnea hypopnea index of 6.8/hr with lowest O2 saturation of 62%, patient spent about 4.8 minutes below 90% (weight was 168 pounds). Subsequently, the patient underwent successful PAP titration on 10/04/2022, the lowest O2 saturation while on PAP was 94%. Patient is using the PAP machine about 93% of the time, more than 4 hours a nightabout  93 %, in total average of 7:35 hours a night in last 40 days. Currently on PAP at 8 cm (), the AHI is 15.6 events per hour at this pressure. Patient improved regarding daytime sleepiness and fatigue, wakes up refreshed in the morning. The Patient scored Brokaw Sleepiness Score: 2 on Brokaw Sleepiness Scale ( more than 10 is indicative of daytime sleepiness)   Patient has no problem with PAP pressure or mask, uses nasal.     BP is stable. Has gained 5 pounds since last visit.    DOT/CDL - N/A        Previous Report(s)Reviewed: historical medical records         Social History     Socioeconomic History    Marital status:      Spouse name: Not on file    Number of children: Not on file    Years of education: Not on file    Highest education level: Not on file   Occupational History    Not on file   Tobacco Use    Smoking status: Former     Packs/day: 0.50     Years: 5.00     Pack years: 2.50     Types: Cigarettes     Quit date: 1975     Years since quittin.3     Passive exposure: Past    Smokeless tobacco: Never   Vaping Use    Vaping Use: Never used   Substance and Sexual Activity    Alcohol use: Yes     Comment: Socially    Drug use: No    Sexual activity: Not on file   Other Topics Concern    Not on file   Social History Narrative    Not on file     Social Determinants of Health     Financial Resource Strain: Low Risk     Difficulty of Paying Living Expenses: Not hard at all   Food Insecurity: No Food Insecurity    Worried About Running Out of Food in the Last Year: Never true    Ran Out of Food in the Last Year: Never true   Transportation Needs: Not on file   Physical Activity: Inactive    Days of Exercise per Week: 0 days    Minutes of Exercise per Session: 0 min   Stress: Not on file   Social Connections: Not on file   Intimate Partner Violence: Not on file   Housing Stability: Not on file       Prior to Admission medications    Medication Sig Start Date End Date Taking?  Authorizing Provider   fluticasone (FLONASE) 50 MCG/ACT nasal spray 2 sprays by Each Nostril route daily 23  Yes Haley Millan MD   losartan-hydroCHLOROthiazide (HYZAAR) 100-12.5 MG per tablet Take 1 tablet by mouth every day 22  Yes Godwin Celeste MD   melatonin 5 MG TABS tablet Take 5 mg by mouth nightly   Yes Historical Provider, MD   Multiple Vitamins-Minerals (PRESERVISION AREDS 2 PO) Take by mouth   Yes Historical Provider, MD   atorvastatin (LIPITOR) 10 MG tablet Take 1 tablet by mouth daily 10/17/22  Yes Godwin Celeste MD   amLODIPine (NORVASC) 5 MG tablet TAKE 1 TABLET BY MOUTH EVERY DAY 22  Yes Godwin Celeste MD   meloxicam (MOBIC) 15 MG tablet TAKE 1 TABLET BY MOUTH EVERY DAY AS NEEDED FOR PAIN 22 Yes Joy Dangelo MD   Calcium Carbonate Antacid (TUMS E-X PO) Take by mouth   Yes Historical Provider, MD   Cholecalciferol (VITAMIN D-3 PO) Take by mouth   Yes Historical Provider, MD   timolol (TIMOPTIC) 0.5 % ophthalmic solution  4/13/16  Yes Historical Provider, MD   therapeutic multivitamin-minerals (THERAGRAN-M) tablet Take 1 tablet by mouth daily    Yes Historical Provider, MD   omeprazole (PRILOSEC OTC) 20 MG tablet Take 20 mg by mouth 2 times daily.  5/13/10  Yes Historical Provider, MD   clobetasol (TEMOVATE) 0.05 % ointment Apply BID for up to 2 weeks torso, arms, scalp avoid face 10/27/21   Roc Marquez MD       Allergies as of 01/09/2023 - Fully Reviewed 01/09/2023   Allergen Reaction Noted    Nsaids Other (See Comments) 05/26/2016       Patient Active Problem List   Diagnosis    Hypertension    Peptic ulcer    Hyperlipidemia    Vertigo    Ganglion of left wrist    Neck arthritis C5-C6-C7    Partial tear of right rotator cuff    Chronic left shoulder pain    Osteopenia    Arthritis    Degenerative disc disease, lumbar    Hoarseness of voice    Hair loss    ASHER (obstructive sleep apnea)       Past Medical History:   Diagnosis Date    Cancer ([facility] [address]     MELANOMA    Dysphagia, unspecified(787.20) 05/13/2010    Essential hypertension, benign 05/13/2010    Glaucoma     Kidney stones     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 Christina Castillo    COLONOSCOPY  2003    normal - dr Uriel Sauceda 10/9/2018    [place] performed by Brian Rosales MD at [facility]  10/09/2018    [facility]    UPPER GASTROINTESTINAL ENDOSCOPY N/A 10/9/2018    EGD DIAGNOSTIC ONLY performed by Brian [facility]ales MD at [facility] 23       Family History   Problem Relation Age of Onset    Stroke Mother     Heart Disease Father     Diabetes Brother     Kidney stones Brother     Breast Cancer Maternal Aunt     Breast Cancer Paternal Aunt        Review of Systems    Objective:     Vitals:  Weight BMI Neck circumference    Wt Readings from Last 3 Encounters:   01/09/23 173 lb 3.2 oz (78.6 kg)   10/17/22 173 lb (78.5 kg)   08/31/22 168 lb (76.2 kg)    Body mass index is 30.68 kg/m². BP HR SaO2   BP Readings from Last 3 Encounters:   01/09/23 120/80   10/17/22 136/72   08/31/22 (!) 140/84    Pulse Readings from Last 3 Encounters:   01/09/23 59   08/31/22 60   08/04/22 60    SpO2 Readings from Last 3 Encounters:   01/09/23 98%   08/31/22 99%   10/01/19 98%        Themandibular molar Class :   [x]1 []2 []3      Mallampati I Airway Classification:   []1 []2 []3 [x]4      Physical Exam    :   Mild Obstructive Sleep Apnea/Hypopnea Syndrome, still has events at 8 cm. Diagnosis Orders   1. ASHER on CPAP        2. Nasal congestion  fluticasone (FLONASE) 50 MCG/ACT nasal spray      3. Dependence on other enabling machines and devices        4. Primary hypertension          Plan:       I adjusted the PAP pressure to the PAP pressure of 6 and 14 cmwp. I educated the Patient about the PAP machine including how to change the heated humidifier. I will order PAP supplies, mask, filters. ... No orders of the defined types were placed in this encounter. Return in about 1 year (around 1/9/2024) for Reveiwing CPAP usage and compliance report and tro.     Abdirashid Greene MD  Medical Director - [facility]

## 2023-01-17 ENCOUNTER — TELEPHONE (OUTPATIENT)
Dept: PULMONOLOGY | Age: 73
End: 2023-01-17

## 2023-01-17 NOTE — TELEPHONE ENCOUNTER
Toyin Oshea says Fernie Candida changed Nichole's CPAP prescription on 1-9-23 and [facility] needs that updated order faxed to them. Please fax. Thank you!

## 2023-01-17 NOTE — PROGRESS NOTES
Adair Smith         : 1950        PHONE: [phone] (home)   [x] [address]      [] [address]      [] [place]     [] [place]    [] Marvin Lester  [] [facility]   [] [address] [place] [address]  [] [facility] [] Other:     Diagnosis: [x] ASHER (G47.33) [] CSA (G47.31) [] Apnea (G47.30)   Length of Need: [] 12 Months [x] 99 Months [] Other:    Machine (LILIANA!): [] Respironics Dream Station      Auto [] ResMed AirSense     Auto [] Other:     []  CPAP () [] Bilevel ()   Mode: [] Auto [] Spontaneous    Mode: [] Auto [] Spontaneous                            Comfort Settings:   - Ramp Pressure: 5 cmH2O                                        - Ramp time: 15 min                                     -  Flex/EPR - 3 full time                                    - For ResMed Bilevel (TiMax-4 sec   TiMin- 0.2 sec)     Humidifier: [] Heated ()        [x] Water chamber replacement ()/ 1 per 6 months        Mask:   [x] Nasal () /1 per 3 months [] Full Face () /1 per 3 months   [x] Patient choice -Size and fit mask [] Patient Choice - Size and fit mask   [] Dispense:  [] Dispense:    [x] Headgear () / 1 per 3 months [] Headgear () / 1 per 3 months   [x] Replacement Nasal Cushion ()/2 per month [] Interface Replacement ()/1 per month   [x] Replacement Nasal Pillows ()/2 per month         Tubing: [x] Heated ()/1 per 3 months    [] Standard ()/1 per 3 months [] Other:           Filters: [x] Non-disposable ()/1 per 6 months     [x] Ultra-Fine, Disposable ()/2 per month        Miscellaneous: [x] Chin Strap ()/ 1 per 6 months [] O2 bleed-in:       LPM   [] Oximetry on CPAP/Bilevel []  Other:          Start Order Date: 23    MEDICAL JUSTIFICATION:  I, the undersigned, certify that the above prescribed supplies are medically necessary for this patients wellbeing.   In my opinion, the supplies are both reasonable and necessary in reference to accepted standards of medicalpractice in treatment of this patients condition.     Fercho Chowdhury MD      NPI: [id number]       Order Signed Date: 01/17/23    Electronically signed by Fercho Chowdhury MD on 1/17/2023 at 10:45 AM

## 2023-02-06 NOTE — PROGRESS NOTES
[address]   Electrophysiology  Office Visit  Date: 2/14/2023    Chief Complaint   Patient presents with    Palpitations    Dizziness    Hypertension     Cardiac HX: Suhail Burns is a 67 y.o. woman with a h/o HTN, HLD, GIB, dizziness, lightheadedness and palpitations. Interval History/HPI: Patient is here for follow-up for palpitations and lightheadedness. Patient was originally referred by her PCP for an irregular heartbeat. She had worn a 24-hour Holter monitor in June 2022 that showed NSR, less than 1% PAC and PVC burden and symptoms that correlated with PVCs. These started in May 2022. She was feeling a hollow feeling in her chest followed by brief lightheadedness and then the hollow feeling was lasting longer. This happened frequently for about 2 weeks. She did not know of any triggers for the symptoms however she had gotten a steroid injection a month prior. She does have a family history of atrial fibrillation and her  states that she snores. She did have a sleep study and is being treated. She is not sure that she feels any better. She continues to complains of palpitations. She had seen Dr. Nida Alvarez who suggested that she do further monitoring on her own which included either a [facility], Apple watch or an ILR. Patient states that she did not do any of that until this past weekend when she started feeling more palpitations. She went and bought a [facility] and has been recording her symptoms. She states that her episode on Saturday she felt palpitations and then felt not so well the rest of the day. Review of the [facility] shows bigeminal PACs on that day. Her other recordings are difficult to interpret due to artifact and poor tracing. She was also told to drink more fluid, exercise and make some lifestyle changes. She states today that she has not done any of that either.   She knows she has gained weight however she has a lot of issues with arthritis and does not feel that she can get around very well without pain. Did discuss at length in the office today ideas for her to be more active, change her diet and drink more fluid. Handouts were placed on her after visit summary. Patient is very anxious about these results of palpitations. She did have a brother that  last year from what she thought was atrial fibrillation however in a longer discussion it sounds like he had a lot more health issues than just AF. Home medications:   Current Outpatient Medications on File Prior to Visit   Medication Sig Dispense Refill    fluticasone (FLONASE) 50 MCG/ACT nasal spray SHAKE LIQUID AND USE 2 SPRAYS IN EACH NOSTRIL DAILY 48 g 5    losartan-hydroCHLOROthiazide (HYZAAR) 100-12.5 MG per tablet Take 1 tablet by mouth every day 90 tablet 1    melatonin 5 MG TABS tablet Take 5 mg by mouth nightly      Multiple Vitamins-Minerals (PRESERVISION AREDS 2 PO) Take by mouth      atorvastatin (LIPITOR) 10 MG tablet Take 1 tablet by mouth daily 90 tablet 1    amLODIPine (NORVASC) 5 MG tablet TAKE 1 TABLET BY MOUTH EVERY DAY 90 tablet 2    meloxicam (MOBIC) 15 MG tablet TAKE 1 TABLET BY MOUTH EVERY DAY AS NEEDED FOR PAIN 90 tablet 1    Calcium Carbonate Antacid (TUMS E-X PO) Take by mouth      Cholecalciferol (VITAMIN D-3 PO) Take by mouth      timolol (TIMOPTIC) 0.5 % ophthalmic solution       therapeutic multivitamin-minerals (THERAGRAN-M) tablet Take 1 tablet by mouth daily       omeprazole (PRILOSEC OTC) 20 MG tablet Take 20 mg by mouth 2 times daily. [DISCONTINUED] clobetasol (TEMOVATE) 0.05 % ointment Apply BID for up to 2 weeks torso, arms, scalp avoid face 30 g 0     No current facility-administered medications on file prior to visit.        Past Medical History:   Diagnosis Date    Cancer ([facility] [address]     MELANOMA    Dysphagia, unspecified(787.20) 2010    Essential hypertension, benign 2010    Glaucoma     Kidney stones     Lesion of plantar nerve 2010    Other and unspecified hyperlipidemia 05/13/2010    Sleep apnea     with cpap    Ulcer disease 2003        Past Surgical History:   Procedure Laterality Date    BREAST SURGERY      Biopsy    CHOLECYSTECTOMY  07/03/2012    Dr. DESTINY Bobby    COLONOSCOPY  2003    normal - dr oden    DILATION AND CURETTAGE OF UTERUS      ESOPHAGEAL DILATATION N/A 10/9/2018    ESOPHAGEAL DILATION PHILLIPS performed by Milton Oden MD at [facility] ENDOSCOPY    HEMORRHOID SURGERY      SKIN BIOPSY      TONSILLECTOMY      UPPER GASTROINTESTINAL ENDOSCOPY  10/09/2018    [facility]ant    UPPER GASTROINTESTINAL ENDOSCOPY N/A 10/9/2018    EGD DIAGNOSTIC ONLY performed by Milton Oden MD at [facility] ENDOSCOPY       Allergies   Allergen Reactions    Nsaids Other (See Comments)     History of GI bleed       Social History:  Reviewed.  reports that she quit smoking about 47 years ago. Her smoking use included cigarettes. She has a 2.50 pack-year smoking history. She has been exposed to tobacco smoke. She has never used smokeless tobacco. She reports current alcohol use. She reports that she does not use drugs.     Family History:  Reviewed. family history includes Breast Cancer in her maternal aunt and paternal aunt; Diabetes in her brother; Heart Disease in her father; Kidney stones in her brother; Stroke in her mother.     Review of System:    Constitutional: No fevers, chills.   Eyes: No visual changes or diplopia. No scleral icterus.  ENT: No Headaches. No mouth sores or sore throat.  Cardiovascular: No for chest pain, No for dyspnea on exertion, Yes for palpitations or No for loss of consciousness. No cough, hemoptysis, No for pleuritic pain, or phlebitis.  Respiratory: No for cough or wheezing. No hematemesis.    Gastrointestinal: No abdominal pain, blood in stools.   Genitourinary: No dysuria, or hematuria.  Musculoskeletal: No gait disturbance,    Integumentary: No rash or pruritis.  Neurological: No headache, change in muscle strength,  numbness or tingling. Psychiatric: No anxiety, or depression. Endocrine: No temperature intolerance. No excessive thirst, fluid intake, or urination. Hem/Lymph: No abnormal bruising or bleeding, blood clots or swollen lymph nodes. Allergic/Immunologic: No nasal congestion or hives. Physical Examination:  Vitals:    02/14/23 1124   BP: 126/68   Pulse: 68       Wt Readings from Last 3 Encounters:   02/14/23 173 lb (78.5 kg)   01/09/23 173 lb 3.2 oz (78.6 kg)   10/17/22 173 lb (78.5 kg)     Constitutional: Oriented. No distress. Head: Normocephalic and atraumatic. Mouth/Throat: Oropharynx is clear and moist.   Eyes: Conjunctivae clear without jaunduice. PERRL. Neck: Neck supple. No rigidity. No JVD present. Cardiovascular: Normal rate, regular rhythm, S1&S2. Pulmonary/Chest: Bilateral respiratory sounds. No wheezes, No rhonchi. Abdominal: Soft. Bowel sounds present. No distension, No tenderness. Musculoskeletal: No tenderness. No edema    Lymphadenopathy: Has no cervical adenopathy. Neurological: Alert and oriented. Cranial nerve appears intact, No Gross deficit   Skin: Skin is warm and dry. No rash noted. Psychiatric: Has a normal mood, affect and behavior     Labs:  Reviewed. No results for input(s): NA, K, CL, CO2, PHOS, BUN, CREATININE, CA in the last 72 hours. Invalid input(s):  TSH  No results for input(s): WBC, HGB, HCT, MCV, PLT in the last 72 hours.   No results found for: CKTOTAL, CKMB, CKMBINDEX, TROPONINI  No results found for: BNP  No results found for: PROTIME, INR  Lab Results   Component Value Date/Time    CHOL 235 10/12/2022 10:14 AM    HDL 54 10/12/2022 10:14 AM    TRIG 185 10/12/2022 10:14 AM       ECG: Personally reviewed: NSR with bigeminal PACs, HR 68, , QRS 96, QTc 418    24 hr Holter (6/2/22): SR with average HR 77 (), 432 PVC (<1%), PAC (<1%), symptoms correlating with PVC    Echo 6/2/22:   Summary   Technically difficult study due to poor acoustical window. Definity was   administered. Normal left ventricle size, wall thickness and systolic function with an   estimated ejection fraction of 60%. No regional wall motion abnormalities are seen. Normal diastolic function. E/e' = 10.1. Stress Test: none recent    Cardiac Angiography: N/a    Problem List:   Patient Active Problem List    Diagnosis Date Noted    ASHER (obstructive sleep apnea) 09/28/2022    Hoarseness of voice 10/13/2021    Hair loss 10/13/2021    Arthritis 10/01/2019    Degenerative disc disease, lumbar 10/01/2019    Osteopenia 05/08/2018    Chronic left shoulder pain 09/26/2017    Partial tear of right rotator cuff 06/16/2016    Neck arthritis C5-C6-C7 05/26/2016    Vertigo 10/23/2012    Ganglion of left wrist 10/23/2012    Hypertension 05/13/2010    Peptic ulcer 05/13/2010    Hyperlipidemia 05/13/2010        Assessment:   1. Palpitations    2. PVC (premature ventricular contraction)    3. PAC (premature atrial contraction)    4. Primary hypertension      Cardiac HX: Nelson Gil is a 67 y.o. woman with a h/o HTN, HLD, dizziness, lightheadedness and palpitations, outpatient monitor showed PACs/PVCs. Palpitations  - Continues to have  - Will start on low dose Toprol - patient to start with 12.5 mg QD  - She will call me in 2 weeks with her BP and how she feels  - Will increase to 25 mg if needed  - Patient to continue to monitor her Laura Diaz and reach out to family for IT assistance on uploading Laura Diaz reports it to her email  - F/u in 3 months  - ECG ordered and results personally reviewed     HTN  - BP well controlled in the office today. - On amlodipine 5 mg QD, Hyzaar 100 - 12.5 mg QD  - Lifestyle modification - diet, exercise and weight loss - handouts given    EF of 60%  ASHER on PAP, follows with  Dr Tova Sheikh  No systolic HF  No known CAD  No known AF   No Tobacco use. All questions and concerns were addressed to the patient/family. Alternatives to my treatment were discussed. The note was completed using EMR. Every effort was made to ensure accuracy; however, inadvertent computerized transcription errors may be present. Patient received education regarding their diagnosis, treatment and medications while in the office today. Karin Wolff CNP  [facility]      I  have spent 45 minutes in care of the patient including direct face to face time, chart preparation, reviewing diagnostic testing, other provider notes and coordinating patient care.

## 2023-02-14 ENCOUNTER — OFFICE VISIT (OUTPATIENT)
Dept: CARDIOLOGY CLINIC | Age: 73
End: 2023-02-14
Payer: MEDICARE

## 2023-02-14 VITALS
SYSTOLIC BLOOD PRESSURE: 126 MMHG | WEIGHT: 173 LBS | BODY MASS INDEX: 30.65 KG/M2 | HEART RATE: 68 BPM | DIASTOLIC BLOOD PRESSURE: 68 MMHG

## 2023-02-14 DIAGNOSIS — I10 PRIMARY HYPERTENSION: ICD-10-CM

## 2023-02-14 DIAGNOSIS — I49.1 PAC (PREMATURE ATRIAL CONTRACTION): ICD-10-CM

## 2023-02-14 DIAGNOSIS — I49.3 PVC (PREMATURE VENTRICULAR CONTRACTION): ICD-10-CM

## 2023-02-14 DIAGNOSIS — R00.2 PALPITATIONS: Primary | ICD-10-CM

## 2023-02-14 PROCEDURE — 3078F DIAST BP <80 MM HG: CPT | Performed by: NURSE PRACTITIONER

## 2023-02-14 PROCEDURE — G8399 PT W/DXA RESULTS DOCUMENT: HCPCS | Performed by: NURSE PRACTITIONER

## 2023-02-14 PROCEDURE — 1123F ACP DISCUSS/DSCN MKR DOCD: CPT | Performed by: NURSE PRACTITIONER

## 2023-02-14 PROCEDURE — 1036F TOBACCO NON-USER: CPT | Performed by: NURSE PRACTITIONER

## 2023-02-14 PROCEDURE — 1090F PRES/ABSN URINE INCON ASSESS: CPT | Performed by: NURSE PRACTITIONER

## 2023-02-14 PROCEDURE — 93000 ELECTROCARDIOGRAM COMPLETE: CPT | Performed by: NURSE PRACTITIONER

## 2023-02-14 PROCEDURE — G8484 FLU IMMUNIZE NO ADMIN: HCPCS | Performed by: NURSE PRACTITIONER

## 2023-02-14 PROCEDURE — G8427 DOCREV CUR MEDS BY ELIG CLIN: HCPCS | Performed by: NURSE PRACTITIONER

## 2023-02-14 PROCEDURE — G8417 CALC BMI ABV UP PARAM F/U: HCPCS | Performed by: NURSE PRACTITIONER

## 2023-02-14 PROCEDURE — 3017F COLORECTAL CA SCREEN DOC REV: CPT | Performed by: NURSE PRACTITIONER

## 2023-02-14 PROCEDURE — 99215 OFFICE O/P EST HI 40 MIN: CPT | Performed by: NURSE PRACTITIONER

## 2023-02-14 PROCEDURE — 3074F SYST BP LT 130 MM HG: CPT | Performed by: NURSE PRACTITIONER

## 2023-02-14 RX ORDER — METOPROLOL SUCCINATE 25 MG/1
25 TABLET, EXTENDED RELEASE ORAL DAILY
Qty: 30 TABLET | Refills: 5 | Status: SHIPPED | OUTPATIENT
Start: 2023-02-14 | End: 2023-02-15 | Stop reason: SDUPTHER

## 2023-02-15 RX ORDER — METOPROLOL SUCCINATE 25 MG/1
25 TABLET, EXTENDED RELEASE ORAL DAILY
Qty: 90 TABLET | Refills: 3 | Status: SHIPPED | OUTPATIENT
Start: 2023-02-15

## 2023-02-15 NOTE — TELEPHONE ENCOUNTER
Requested Prescriptions     Pending Prescriptions Disp Refills    metoprolol succinate (TOPROL XL) 25 MG extended release tablet 90 tablet 3     Sig: Take 1 tablet by mouth daily          Last Office Visit: 2/14/2023     Next Office Visit: 5/18/2023       Last Labs: [phone]

## 2023-02-28 ENCOUNTER — TELEPHONE (OUTPATIENT)
Dept: CARDIOLOGY CLINIC | Age: 73
End: 2023-02-28

## 2023-02-28 NOTE — TELEPHONE ENCOUNTER
Spoke with pt at great length. Since seeing FW on 2/14/23, she has been experiencing daily episodes of a \"hollow\" feeling in her chest followed by brief LHD, causing her to want to take a deep breath. Some days this occurs up to 12 times. Since starting the Toprol 12.5 mg daily, she doesn't feel much difference in her symptoms. See below for her BP/HR readings. Her Alicia Casanova has been reading possible AF and SR. She will work on [facility] [employer] the tracings to me.

## 2023-02-28 NOTE — TELEPHONE ENCOUNTER
Patient called and stated she was in the office 2wks ago and Alyssa Bernal wanted her to call her regarding how she was feeling. She had been using a cardia mobile device to monitor her heart. And she was prescribed new medication which she has been taking. Patient stated she hasn't been feeling the best when she has these episodes like a hollow feeling that comes and goes. She's been documenting these episodes in a daily dairy, which comes and goes. She stated that one day the episode occurred 10 times in 1 day. She also stated that she's been watching her salt intake. She also stated that she's been documenting her BP 2x in the past wk. Here are her readings:    BP:  2/15/23  127/69 P 52    2/19/23  129/66 P 60    2/23/23  106/58 P 70    2/25/23  119/53 P 58    Cardio mobile readings: when she pushes the button on the machine, most of the time it shows \"your EKG shows signs of AFIB. \"  There has been times when it says normal rhythm and 2x unclassified. 10x in her recordings it says atrial fibulation. Patient is very concerned and wants a call back [phone].

## 2023-03-02 NOTE — TELEPHONE ENCOUNTER
Spoke with pt. She has not had a chance to email her tracings to me yet. She will work on this afternoon.

## 2023-03-08 ENCOUNTER — NURSE ONLY (OUTPATIENT)
Dept: CARDIOLOGY CLINIC | Age: 73
End: 2023-03-08

## 2023-03-08 DIAGNOSIS — I10 HYPERTENSION, UNSPECIFIED TYPE: Primary | ICD-10-CM

## 2023-03-14 NOTE — PROGRESS NOTES
14 day CAM monitor placed during clinic   Placed by Elissa King by Moses Samson  Dx:A-Fib   CAM Id # [id number]

## 2023-03-15 ENCOUNTER — TELEPHONE (OUTPATIENT)
Dept: INTERNAL MEDICINE CLINIC | Age: 73
End: 2023-03-15

## 2023-03-15 NOTE — TELEPHONE ENCOUNTER
If she is asking whether she should be on aspirin due to the irregular heartbeat, no, she does not need to take aspirin due to the irregular heartbeat.

## 2023-03-15 NOTE — TELEPHONE ENCOUNTER
Pt has been diagnosed with an irregular heartbeat and pt saw a cardiologist.  Pt wants to know if she should take a baby aspirin. Had a bleeding ulcer 20 years ago and wanted to know if that was okay.

## 2023-04-03 ENCOUNTER — NURSE ONLY (OUTPATIENT)
Dept: CARDIOLOGY CLINIC | Age: 73
End: 2023-04-03

## 2023-04-11 DIAGNOSIS — E78.5 HYPERLIPIDEMIA, UNSPECIFIED HYPERLIPIDEMIA TYPE: ICD-10-CM

## 2023-04-12 LAB
ALBUMIN SERPL-MCNC: 4.3 G/DL (ref 3.4–5)
ALBUMIN/GLOB SERPL: 2 {RATIO} (ref 1.1–2.2)
ALP SERPL-CCNC: 103 U/L (ref 40–129)
ALT SERPL-CCNC: 13 U/L (ref 10–40)
ANION GAP SERPL CALCULATED.3IONS-SCNC: 16 MMOL/L (ref 3–16)
AST SERPL-CCNC: 19 U/L (ref 15–37)
BILIRUB SERPL-MCNC: 0.4 MG/DL (ref 0–1)
BUN SERPL-MCNC: 16 MG/DL (ref 7–20)
CALCIUM SERPL-MCNC: 9.5 MG/DL (ref 8.3–10.6)
CHLORIDE SERPL-SCNC: 101 MMOL/L (ref 99–110)
CHOLEST SERPL-MCNC: 175 MG/DL (ref 0–199)
CO2 SERPL-SCNC: 26 MMOL/L (ref 21–32)
CREAT SERPL-MCNC: 0.9 MG/DL (ref 0.6–1.2)
GFR SERPLBLD CREATININE-BSD FMLA CKD-EPI: >60 ML/MIN/{1.73_M2}
GLUCOSE SERPL-MCNC: 102 MG/DL (ref 70–99)
HDLC SERPL-MCNC: 60 MG/DL (ref 40–60)
LDLC SERPL CALC-MCNC: 96 MG/DL
POTASSIUM SERPL-SCNC: 4.3 MMOL/L (ref 3.5–5.1)
PROT SERPL-MCNC: 6.4 G/DL (ref 6.4–8.2)
SODIUM SERPL-SCNC: 143 MMOL/L (ref 136–145)
TRIGL SERPL-MCNC: 95 MG/DL (ref 0–150)
VLDLC SERPL CALC-MCNC: 19 MG/DL

## 2023-04-18 ENCOUNTER — OFFICE VISIT (OUTPATIENT)
Dept: INTERNAL MEDICINE CLINIC | Age: 73
End: 2023-04-18

## 2023-04-18 ENCOUNTER — HOSPITAL ENCOUNTER (OUTPATIENT)
Dept: GENERAL RADIOLOGY | Age: 73
Discharge: HOME | End: 2023-04-18
Payer: MEDICARE

## 2023-04-18 VITALS
SYSTOLIC BLOOD PRESSURE: 130 MMHG | BODY MASS INDEX: 30.83 KG/M2 | HEIGHT: 63 IN | WEIGHT: 174 LBS | DIASTOLIC BLOOD PRESSURE: 72 MMHG

## 2023-04-18 DIAGNOSIS — M85.80 OSTEOPENIA, UNSPECIFIED LOCATION: ICD-10-CM

## 2023-04-18 DIAGNOSIS — M79.642 LEFT HAND PAIN: ICD-10-CM

## 2023-04-18 DIAGNOSIS — E78.5 HYPERLIPIDEMIA, UNSPECIFIED HYPERLIPIDEMIA TYPE: Primary | ICD-10-CM

## 2023-04-18 DIAGNOSIS — I10 PRIMARY HYPERTENSION: ICD-10-CM

## 2023-04-18 PROCEDURE — 73130 X-RAY EXAM OF HAND: CPT

## 2023-04-18 SDOH — ECONOMIC STABILITY: HOUSING INSECURITY
IN THE LAST 12 MONTHS, WAS THERE A TIME WHEN YOU DID NOT HAVE A STEADY PLACE TO SLEEP OR SLEPT IN A SHELTER (INCLUDING NOW)?: NO

## 2023-04-18 SDOH — ECONOMIC STABILITY: FOOD INSECURITY: WITHIN THE PAST 12 MONTHS, THE FOOD YOU BOUGHT JUST DIDN'T LAST AND YOU DIDN'T HAVE MONEY TO GET MORE.: NEVER TRUE

## 2023-04-18 SDOH — ECONOMIC STABILITY: INCOME INSECURITY: HOW HARD IS IT FOR YOU TO PAY FOR THE VERY BASICS LIKE FOOD, HOUSING, MEDICAL CARE, AND HEATING?: NOT HARD AT ALL

## 2023-04-18 SDOH — ECONOMIC STABILITY: FOOD INSECURITY: WITHIN THE PAST 12 MONTHS, YOU WORRIED THAT YOUR FOOD WOULD RUN OUT BEFORE YOU GOT MONEY TO BUY MORE.: NEVER TRUE

## 2023-04-18 ASSESSMENT — PATIENT HEALTH QUESTIONNAIRE - PHQ9
SUM OF ALL RESPONSES TO PHQ QUESTIONS 1-9: 0
2. FEELING DOWN, DEPRESSED OR HOPELESS: 0
SUM OF ALL RESPONSES TO PHQ9 QUESTIONS 1 & 2: 0
SUM OF ALL RESPONSES TO PHQ QUESTIONS 1-9: 0
1. LITTLE INTEREST OR PLEASURE IN DOING THINGS: 0
SUM OF ALL RESPONSES TO PHQ QUESTIONS 1-9: 0
SUM OF ALL RESPONSES TO PHQ QUESTIONS 1-9: 0

## 2023-04-18 NOTE — PROGRESS NOTES
Katarina Gallego (:  1950) is a 67 y.o. female,Established patient, here for evaluation of the following chief complaint(s):  Hypertension and Hyperlipidemia         ASSESSMENT/PLAN:  1. Hyperlipidemia, unspecified hyperlipidemia type  -Improved, continue atorvastatin 10 mg daily  -     Comprehensive Metabolic Panel; Future  -     Lipid Panel; Future  -     CBC with Auto Differential; Future  -     TSH with Reflex; Future  2. Primary hypertension  -Controlled, continue amlodipine 5 mg daily, losartan-HCTZ 100-12.5 mg daily, metoprolol XL 25 mg daily  -     Comprehensive Metabolic Panel; Future  -     Lipid Panel; Future  -     CBC with Auto Differential; Future  -     TSH with Reflex; Future  3. Left hand pain  -Suspect osteoarthritis  -     XR HAND LEFT (MIN 3 VIEWS); Future  4. Osteopenia, unspecified location  -     Vitamin D 25 Hydroxy; Future      Return in about 6 months (around 10/18/2023) for AWV. Subjective   SUBJECTIVE/OBJECTIVE:  HPI    Hyperlipidemia -tolerating the atorvastatin, no myalgias or arthralgias. She does not be the cholesterol has come down. She just completed a 2-week heart monitor, will be following up with the cardiologist once that is done. She is concerned about A-fib due to her brother's history. Hypertension -she is adherent to blood pressure medications, no chest pain or shortness of breath. She has been experiencing a lot of pain in both hands. More in the DIP, PIP, sometimes in the wrist.  The left fourth finger has recently been swelling as well, no trauma. She uses topical Voltaren on her hands which gives her some relief. Review of Systems       Objective   Physical Exam  Vitals reviewed. Constitutional:       General: She is not in acute distress. Appearance: Normal appearance. She is well-developed. HENT:      Head: Normocephalic and atraumatic. Cardiovascular:      Rate and Rhythm: Normal rate and regular rhythm.       Heart sounds:

## 2023-04-26 ENCOUNTER — OFFICE VISIT (OUTPATIENT)
Dept: CARDIOLOGY CLINIC | Age: 73
End: 2023-04-26
Payer: MEDICARE

## 2023-04-26 VITALS
SYSTOLIC BLOOD PRESSURE: 130 MMHG | HEART RATE: 61 BPM | WEIGHT: 171 LBS | BODY MASS INDEX: 30.29 KG/M2 | DIASTOLIC BLOOD PRESSURE: 60 MMHG

## 2023-04-26 DIAGNOSIS — I10 BENIGN ESSENTIAL HTN: ICD-10-CM

## 2023-04-26 DIAGNOSIS — R00.2 PALPITATIONS: Primary | ICD-10-CM

## 2023-04-26 DIAGNOSIS — R42 LIGHTHEADEDNESS: ICD-10-CM

## 2023-04-26 PROCEDURE — G8427 DOCREV CUR MEDS BY ELIG CLIN: HCPCS | Performed by: NURSE PRACTITIONER

## 2023-04-26 PROCEDURE — 1036F TOBACCO NON-USER: CPT | Performed by: NURSE PRACTITIONER

## 2023-04-26 PROCEDURE — 3078F DIAST BP <80 MM HG: CPT | Performed by: NURSE PRACTITIONER

## 2023-04-26 PROCEDURE — 1090F PRES/ABSN URINE INCON ASSESS: CPT | Performed by: NURSE PRACTITIONER

## 2023-04-26 PROCEDURE — 99215 OFFICE O/P EST HI 40 MIN: CPT | Performed by: NURSE PRACTITIONER

## 2023-04-26 PROCEDURE — 93000 ELECTROCARDIOGRAM COMPLETE: CPT | Performed by: NURSE PRACTITIONER

## 2023-04-26 PROCEDURE — 3017F COLORECTAL CA SCREEN DOC REV: CPT | Performed by: NURSE PRACTITIONER

## 2023-04-26 PROCEDURE — G8399 PT W/DXA RESULTS DOCUMENT: HCPCS | Performed by: NURSE PRACTITIONER

## 2023-04-26 PROCEDURE — 1123F ACP DISCUSS/DSCN MKR DOCD: CPT | Performed by: NURSE PRACTITIONER

## 2023-04-26 PROCEDURE — 3075F SYST BP GE 130 - 139MM HG: CPT | Performed by: NURSE PRACTITIONER

## 2023-04-26 PROCEDURE — G8417 CALC BMI ABV UP PARAM F/U: HCPCS | Performed by: NURSE PRACTITIONER

## 2023-04-26 RX ORDER — METOPROLOL SUCCINATE 25 MG/1
12.5 TABLET, EXTENDED RELEASE ORAL DAILY
Qty: 90 TABLET | Refills: 3 | Status: SHIPPED | OUTPATIENT
Start: 2023-04-26

## 2023-04-27 DIAGNOSIS — R00.2 PALPITATIONS: Primary | ICD-10-CM

## 2023-04-27 RX ORDER — FLECAINIDE ACETATE 100 MG/1
100 TABLET ORAL 2 TIMES DAILY
Qty: 60 TABLET | Refills: 5 | Status: SHIPPED | OUTPATIENT
Start: 2023-04-27

## 2023-05-01 ENCOUNTER — TELEPHONE (OUTPATIENT)
Dept: CARDIOLOGY CLINIC | Age: 73
End: 2023-05-01

## 2023-05-05 DIAGNOSIS — R00.2 PALPITATIONS: Primary | ICD-10-CM

## 2023-05-09 ENCOUNTER — NURSE ONLY (OUTPATIENT)
Dept: CARDIOLOGY CLINIC | Age: 73
End: 2023-05-09
Payer: MEDICARE

## 2023-05-09 DIAGNOSIS — R00.2 PALPITATIONS: Primary | ICD-10-CM

## 2023-05-09 PROCEDURE — 93000 ELECTROCARDIOGRAM COMPLETE: CPT | Performed by: INTERNAL MEDICINE

## 2023-05-23 ENCOUNTER — OFFICE VISIT (OUTPATIENT)
Dept: DERMATOLOGY | Age: 73
End: 2023-05-23
Payer: MEDICARE

## 2023-05-23 DIAGNOSIS — T50.905A ADVERSE EFFECT OF DRUG, INITIAL ENCOUNTER: Primary | ICD-10-CM

## 2023-05-23 PROCEDURE — G8417 CALC BMI ABV UP PARAM F/U: HCPCS | Performed by: DERMATOLOGY

## 2023-05-23 PROCEDURE — 1123F ACP DISCUSS/DSCN MKR DOCD: CPT | Performed by: DERMATOLOGY

## 2023-05-23 PROCEDURE — 1036F TOBACCO NON-USER: CPT | Performed by: DERMATOLOGY

## 2023-05-23 PROCEDURE — 1090F PRES/ABSN URINE INCON ASSESS: CPT | Performed by: DERMATOLOGY

## 2023-05-23 PROCEDURE — 99213 OFFICE O/P EST LOW 20 MIN: CPT | Performed by: DERMATOLOGY

## 2023-05-23 PROCEDURE — 3017F COLORECTAL CA SCREEN DOC REV: CPT | Performed by: DERMATOLOGY

## 2023-05-23 PROCEDURE — G8399 PT W/DXA RESULTS DOCUMENT: HCPCS | Performed by: DERMATOLOGY

## 2023-05-23 PROCEDURE — G8427 DOCREV CUR MEDS BY ELIG CLIN: HCPCS | Performed by: DERMATOLOGY

## 2023-05-23 RX ORDER — PREDNISONE 10 MG/1
TABLET ORAL
Qty: 40 TABLET | Refills: 0 | Status: SHIPPED | OUTPATIENT
Start: 2023-05-23

## 2023-05-23 RX ORDER — TRIAMCINOLONE ACETONIDE 1 MG/G
CREAM TOPICAL
Qty: 450 G | Refills: 0 | Status: SHIPPED | OUTPATIENT
Start: 2023-05-23

## 2023-05-23 NOTE — PROGRESS NOTES
[facility]) Dermatology  Chaka Poon M.D.  [address] [facility]  1950    67 y.o. female     Date of Visit: 2023    Chief Complaint:   Chief Complaint   Patient presents with    Skin Lesion        I was asked to see this patient by Dr. Wu ref. provider found. History of Present Illness:  1. Patient presents today for an urgent appointment-started taking flecainide about 2.5 weeks ago. About 5 days ago developed pruritus-followed by erythematous papules and plaques over her torso, arms, proximal thighs. Mostly pruritic, mild burning. Tried no over-the-counter Ivarest for poison ivy. Did take a Benadryl which was somewhat helpful. Contacted the prescribing doctor who told her to stop the flecainide.  patient on Deepali Adriel    Review of Systems:  Constitutional: Reports general sense of well-being       Past Medical History, Surgical History, Family History, Medications and Allergies reviewed.     Social History:   Social History     Socioeconomic History    Marital status:      Spouse name: Not on file    Number of children: Not on file    Years of education: Not on file    Highest education level: Not on file   Occupational History    Not on file   Tobacco Use    Smoking status: Former     Packs/day: 0.50     Years: 5.00     Pack years: 2.50     Types: Cigarettes     Quit date: 1975     Years since quittin.6     Passive exposure: Past    Smokeless tobacco: Never   Vaping Use    Vaping Use: Never used   Substance and Sexual Activity    Alcohol use: Yes     Comment: Socially    Drug use: No    Sexual activity: Not on file   Other Topics Concern    Not on file   Social History Narrative    Not on file     Social Determinants of Health     Financial Resource Strain: Low Risk     Difficulty of Paying Living Expenses: Not hard at all   Food Insecurity: No Food Insecurity    Worried About [address] in the Last Year: Never true    [address] in

## 2023-06-21 ENCOUNTER — TELEPHONE (OUTPATIENT)
Dept: INTERNAL MEDICINE CLINIC | Age: 73
End: 2023-06-21

## 2023-06-21 NOTE — TELEPHONE ENCOUNTER
Patient would like a letter to be excused from jury duty. They want her to show up on 7/17/23. Email letter to [email or web address]. [email or web address]    # [phone]      Matthias call and advise.

## 2023-06-27 ENCOUNTER — TELEPHONE (OUTPATIENT)
Dept: INTERNAL MEDICINE CLINIC | Age: 73
End: 2023-06-27

## 2023-06-27 DIAGNOSIS — Z12.31 ENCOUNTER FOR SCREENING MAMMOGRAM FOR MALIGNANT NEOPLASM OF BREAST: Primary | ICD-10-CM

## 2023-07-11 ENCOUNTER — HOSPITAL ENCOUNTER (OUTPATIENT)
Dept: WOMENS IMAGING | Age: 73
Discharge: HOME | End: 2023-07-11
Payer: MEDICARE

## 2023-07-11 DIAGNOSIS — Z12.31 VISIT FOR SCREENING MAMMOGRAM: ICD-10-CM

## 2023-07-11 PROCEDURE — 77063 BREAST TOMOSYNTHESIS BI: CPT

## 2023-07-19 ENCOUNTER — NURSE ONLY (OUTPATIENT)
Dept: CARDIOLOGY CLINIC | Age: 73
End: 2023-07-19

## 2023-07-19 ENCOUNTER — TELEPHONE (OUTPATIENT)
Dept: CARDIOLOGY CLINIC | Age: 73
End: 2023-07-19

## 2023-07-27 ENCOUNTER — TELEPHONE (OUTPATIENT)
Dept: INTERNAL MEDICINE CLINIC | Age: 73
End: 2023-07-27

## 2023-07-27 DIAGNOSIS — R00.2 PALPITATION: Primary | ICD-10-CM

## 2023-07-27 NOTE — TELEPHONE ENCOUNTER
I'm not going to be able to get her in quickly since I will be out of the office for part of the next couple weeks - would be the 15th at the earliest  Urgent care if needs to be seen more urgently

## 2023-07-27 NOTE — TELEPHONE ENCOUNTER
TRIAGE NURSE CALL:  SPOKE WITH PATIENT : she is having some (L) wrist/fore arm  pain ,soreness x 2-3 wks

## 2023-08-15 ENCOUNTER — OFFICE VISIT (OUTPATIENT)
Dept: INTERNAL MEDICINE CLINIC | Age: 73
End: 2023-08-15

## 2023-08-15 VITALS
WEIGHT: 174 LBS | HEIGHT: 63 IN | BODY MASS INDEX: 30.83 KG/M2 | SYSTOLIC BLOOD PRESSURE: 128 MMHG | DIASTOLIC BLOOD PRESSURE: 70 MMHG

## 2023-08-15 DIAGNOSIS — M25.532 LEFT WRIST PAIN: Primary | ICD-10-CM

## 2023-08-15 NOTE — PATIENT INSTRUCTIONS
Dr. Gerry Horton office  [address], [phone]    [facility]  [address], [address], [address]  [phone]

## 2023-08-15 NOTE — PROGRESS NOTES
Juan R Morocho (:  1950) is a 67 y.o. female,Established patient, here for evaluation of the following chief complaint(s):  Wrist Pain (And forearm pain on the left side, just picking up things make it hurt, no injury known, 3 weeks )         ASSESSMENT/PLAN:  1. Left wrist pain  -Suspect de Quervain's tenosynovitis. Could have some arthritis contributing but I do not think it is the bulk of the pain. Refer to Ortho. I gave her a handout with things she can do at home. -     External Referral To Orthopedic Surgery      Return if symptoms worsen or fail to improve. Subjective   SUBJECTIVE/OBJECTIVE:  HPI    She has been experiencing left wrist and forearm pain for about the last 3 weeks. She notes pain just picking things up, she was holding a melon in her hand and it was difficult to hold it. No trauma, she does not recall anything that initially started the pain. The pain is largely located on the radial side of the wrist extending into the forearm. Review of Systems       Objective   Physical Exam  Vitals reviewed. Constitutional:       General: She is not in acute distress. Appearance: Normal appearance. Musculoskeletal:         General: No deformity. Normal range of motion. Comments: No swelling of the left wrist, slight tenderness over the extensor tendon of the right thumb. Positive Finkelstein test.   Neurological:      Mental Status: She is alert. An electronic signature was used to authenticate this note.     --Cuong Tavares MD

## 2023-10-09 RX ORDER — ATORVASTATIN CALCIUM 10 MG/1
10 TABLET, FILM COATED ORAL DAILY
Qty: 90 TABLET | Refills: 1 | Status: SHIPPED | OUTPATIENT
Start: 2023-10-09

## 2023-10-19 ENCOUNTER — OFFICE VISIT (OUTPATIENT)
Dept: INTERNAL MEDICINE CLINIC | Age: 73
End: 2023-10-19

## 2023-10-19 VITALS
DIASTOLIC BLOOD PRESSURE: 70 MMHG | WEIGHT: 171 LBS | SYSTOLIC BLOOD PRESSURE: 128 MMHG | HEIGHT: 63 IN | BODY MASS INDEX: 30.3 KG/M2

## 2023-10-19 DIAGNOSIS — Z00.00 MEDICARE ANNUAL WELLNESS VISIT, SUBSEQUENT: Primary | ICD-10-CM

## 2023-10-19 DIAGNOSIS — Z12.11 ENCOUNTER FOR SCREENING FOR MALIGNANT NEOPLASM OF COLON: ICD-10-CM

## 2023-10-19 DIAGNOSIS — Z23 NEED FOR INFLUENZA VACCINATION: ICD-10-CM

## 2023-10-19 DIAGNOSIS — I10 PRIMARY HYPERTENSION: ICD-10-CM

## 2023-10-19 DIAGNOSIS — E78.5 HYPERLIPIDEMIA, UNSPECIFIED HYPERLIPIDEMIA TYPE: ICD-10-CM

## 2023-10-19 DIAGNOSIS — R05.8 OTHER COUGH: ICD-10-CM

## 2023-10-19 RX ORDER — MELOXICAM 7.5 MG/1
7.5 TABLET ORAL DAILY PRN
Qty: 90 TABLET | Refills: 1 | Status: SHIPPED | OUTPATIENT
Start: 2023-10-19

## 2023-10-19 ASSESSMENT — PATIENT HEALTH QUESTIONNAIRE - PHQ9
1. LITTLE INTEREST OR PLEASURE IN DOING THINGS: 0
2. FEELING DOWN, DEPRESSED OR HOPELESS: 0
SUM OF ALL RESPONSES TO PHQ QUESTIONS 1-9: 0
SUM OF ALL RESPONSES TO PHQ QUESTIONS 1-9: 0
SUM OF ALL RESPONSES TO PHQ9 QUESTIONS 1 & 2: 0
SUM OF ALL RESPONSES TO PHQ QUESTIONS 1-9: 0
SUM OF ALL RESPONSES TO PHQ QUESTIONS 1-9: 0

## 2023-10-19 ASSESSMENT — LIFESTYLE VARIABLES
HOW MANY STANDARD DRINKS CONTAINING ALCOHOL DO YOU HAVE ON A TYPICAL DAY: PATIENT DOES NOT DRINK
HOW OFTEN DO YOU HAVE A DRINK CONTAINING ALCOHOL: NEVER

## 2023-10-25 PROBLEM — I49.1 PAC (PREMATURE ATRIAL CONTRACTION): Status: ACTIVE | Noted: 2023-10-25

## 2023-10-26 ENCOUNTER — OFFICE VISIT (OUTPATIENT)
Dept: CARDIOLOGY CLINIC | Age: 73
End: 2023-10-26

## 2023-10-26 VITALS
BODY MASS INDEX: 30.65 KG/M2 | DIASTOLIC BLOOD PRESSURE: 76 MMHG | WEIGHT: 173 LBS | HEART RATE: 58 BPM | SYSTOLIC BLOOD PRESSURE: 130 MMHG

## 2023-10-26 DIAGNOSIS — I10 PRIMARY HYPERTENSION: Primary | ICD-10-CM

## 2023-10-26 DIAGNOSIS — I49.1 PAC (PREMATURE ATRIAL CONTRACTION): ICD-10-CM

## 2023-10-26 DIAGNOSIS — G47.33 OSA (OBSTRUCTIVE SLEEP APNEA): ICD-10-CM

## 2023-11-22 ENCOUNTER — ANESTHESIA EVENT (OUTPATIENT)
Dept: ENDOSCOPY | Age: 73
End: 2023-11-22
Payer: MEDICARE

## 2023-11-29 ENCOUNTER — HOSPITAL ENCOUNTER (OUTPATIENT)
Age: 73
Setting detail: OUTPATIENT SURGERY
Discharge: HOME | End: 2023-11-29
Attending: INTERNAL MEDICINE | Admitting: INTERNAL MEDICINE
Payer: MEDICARE

## 2023-11-29 ENCOUNTER — ANESTHESIA (OUTPATIENT)
Dept: ENDOSCOPY | Age: 73
End: 2023-11-29
Payer: MEDICARE

## 2023-11-29 VITALS
TEMPERATURE: 97.2 F | BODY MASS INDEX: 30.65 KG/M2 | HEIGHT: 63 IN | DIASTOLIC BLOOD PRESSURE: 74 MMHG | SYSTOLIC BLOOD PRESSURE: 179 MMHG | HEART RATE: 52 BPM | OXYGEN SATURATION: 98 % | WEIGHT: 173 LBS | RESPIRATION RATE: 18 BRPM

## 2023-11-29 DIAGNOSIS — Z12.11 SCREEN FOR COLON CANCER: ICD-10-CM

## 2023-11-29 PROCEDURE — 3700000001 HC ADD 15 MINUTES (ANESTHESIA): Performed by: INTERNAL MEDICINE

## 2023-11-29 PROCEDURE — 6360000002 HC RX W HCPCS: Performed by: NURSE ANESTHETIST, CERTIFIED REGISTERED

## 2023-11-29 PROCEDURE — 7100000010 HC PHASE II RECOVERY - FIRST 15 MIN: Performed by: INTERNAL MEDICINE

## 2023-11-29 PROCEDURE — 3700000000 HC ANESTHESIA ATTENDED CARE: Performed by: INTERNAL MEDICINE

## 2023-11-29 PROCEDURE — 2500000003 HC RX 250 WO HCPCS: Performed by: NURSE ANESTHETIST, CERTIFIED REGISTERED

## 2023-11-29 PROCEDURE — 3609010600 HC COLONOSCOPY POLYPECTOMY SNARE/COLD BIOPSY: Performed by: INTERNAL MEDICINE

## 2023-11-29 PROCEDURE — 7100000011 HC PHASE II RECOVERY - ADDTL 15 MIN: Performed by: INTERNAL MEDICINE

## 2023-11-29 PROCEDURE — 2580000003 HC RX 258: Performed by: STUDENT IN AN ORGANIZED HEALTH CARE EDUCATION/TRAINING PROGRAM

## 2023-11-29 PROCEDURE — 2580000003 HC RX 258: Performed by: NURSE ANESTHETIST, CERTIFIED REGISTERED

## 2023-11-29 PROCEDURE — 88305 TISSUE EXAM BY PATHOLOGIST: CPT

## 2023-11-29 PROCEDURE — 2709999900 HC NON-CHARGEABLE SUPPLY: Performed by: INTERNAL MEDICINE

## 2023-11-29 RX ORDER — SODIUM CHLORIDE 9 MG/ML
INJECTION, SOLUTION INTRAVENOUS PRN
Status: DISCONTINUED | OUTPATIENT
Start: 2023-11-29 | End: 2023-11-29 | Stop reason: HOSPADM

## 2023-11-29 RX ORDER — SODIUM CHLORIDE 0.9 % (FLUSH) 0.9 %
5-40 SYRINGE (ML) INJECTION PRN
Status: DISCONTINUED | OUTPATIENT
Start: 2023-11-29 | End: 2023-11-29 | Stop reason: HOSPADM

## 2023-11-29 RX ORDER — SODIUM CHLORIDE 9 MG/ML
INJECTION, SOLUTION INTRAVENOUS PRN
Status: CANCELLED | OUTPATIENT
Start: 2023-11-29

## 2023-11-29 RX ORDER — SODIUM CHLORIDE 0.9 % (FLUSH) 0.9 %
5-40 SYRINGE (ML) INJECTION PRN
Status: CANCELLED | OUTPATIENT
Start: 2023-11-29

## 2023-11-29 RX ORDER — SODIUM CHLORIDE 0.9 % (FLUSH) 0.9 %
5-40 SYRINGE (ML) INJECTION EVERY 12 HOURS SCHEDULED
Status: DISCONTINUED | OUTPATIENT
Start: 2023-11-29 | End: 2023-11-29 | Stop reason: HOSPADM

## 2023-11-29 RX ORDER — PROPOFOL 10 MG/ML
INJECTION, EMULSION INTRAVENOUS PRN
Status: DISCONTINUED | OUTPATIENT
Start: 2023-11-29 | End: 2023-11-29 | Stop reason: SDUPTHER

## 2023-11-29 RX ORDER — ONDANSETRON 2 MG/ML
4 INJECTION INTRAMUSCULAR; INTRAVENOUS
Status: CANCELLED | OUTPATIENT
Start: 2023-11-29 | End: 2023-11-30

## 2023-11-29 RX ORDER — LIDOCAINE HYDROCHLORIDE 20 MG/ML
INJECTION, SOLUTION EPIDURAL; INFILTRATION; INTRACAUDAL; PERINEURAL PRN
Status: DISCONTINUED | OUTPATIENT
Start: 2023-11-29 | End: 2023-11-29 | Stop reason: SDUPTHER

## 2023-11-29 RX ORDER — SODIUM CHLORIDE 0.9 % (FLUSH) 0.9 %
5-40 SYRINGE (ML) INJECTION EVERY 12 HOURS SCHEDULED
Status: CANCELLED | OUTPATIENT
Start: 2023-11-29

## 2023-11-29 RX ORDER — SODIUM CHLORIDE, SODIUM LACTATE, POTASSIUM CHLORIDE, CALCIUM CHLORIDE 600; 310; 30; 20 MG/100ML; MG/100ML; MG/100ML; MG/100ML
INJECTION, SOLUTION INTRAVENOUS CONTINUOUS PRN
Status: DISCONTINUED | OUTPATIENT
Start: 2023-11-29 | End: 2023-11-29 | Stop reason: SDUPTHER

## 2023-11-29 RX ADMIN — SODIUM CHLORIDE: 9 INJECTION, SOLUTION INTRAVENOUS at 07:46

## 2023-11-29 RX ADMIN — PROPOFOL 50 MG: 10 INJECTION, EMULSION INTRAVENOUS at 08:31

## 2023-11-29 RX ADMIN — PROPOFOL 100 MG: 10 INJECTION, EMULSION INTRAVENOUS at 08:15

## 2023-11-29 RX ADMIN — PROPOFOL 100 MG: 10 INJECTION, EMULSION INTRAVENOUS at 08:19

## 2023-11-29 RX ADMIN — PROPOFOL 50 MG: 10 INJECTION, EMULSION INTRAVENOUS at 08:36

## 2023-11-29 RX ADMIN — PROPOFOL 50 MG: 10 INJECTION, EMULSION INTRAVENOUS at 08:25

## 2023-11-29 RX ADMIN — LIDOCAINE HYDROCHLORIDE 40 MG: 20 INJECTION, SOLUTION EPIDURAL; INFILTRATION; INTRACAUDAL; PERINEURAL at 08:19

## 2023-11-29 RX ADMIN — SODIUM CHLORIDE, SODIUM LACTATE, POTASSIUM CHLORIDE, AND CALCIUM CHLORIDE: .6; .31; .03; .02 INJECTION, SOLUTION INTRAVENOUS at 08:10

## 2023-11-29 ASSESSMENT — PAIN SCALES - GENERAL
PAINLEVEL_OUTOF10: 0

## 2023-11-29 ASSESSMENT — PAIN - FUNCTIONAL ASSESSMENT: PAIN_FUNCTIONAL_ASSESSMENT: NONE - DENIES PAIN

## 2023-11-29 NOTE — OP NOTE
care physician. The patient had biopsies taken today. The patient should check patient portal for results in 7 days and call us if they have not heard from our office within 14 days. Our number is [phone].       Sarah Lenz MD  [facility] [address] [place] [address]  11/29/2023  [phone]

## 2023-11-29 NOTE — DISCHARGE INSTRUCTIONS
We removed 2 polyps today. You do have some hemorrhoids. Recommendations:  Await pathology. Repeat colonoscopy in 7 years. Follow up with primary care physician. The patient had biopsies taken today. The patient should check patient portal for results in 7 days and call us if they have not heard from our office within 14 days. Our number is [phone]. Discharge Instructions for Colonoscopy     Colonoscopy is a visual exam of the lining of the large intestine, also called the bowel or colon, with a colonoscope. A colonoscope is a flexible tube with a light and a viewing device. It allows the doctor to view the inside of the colon through a tiny video camera. Colonoscopy is performed for many reasons: unexplained anemia , pain, diarrhea , bloody stools, cancer screening, among many other reasons. Complications from a colonoscopy are rare. Some possible serious complications include perforated bowel (which might require surgery) and bleeding (which could require blood transfusion ). Minor complications include bloating, gas, and cramping that can last for 1-2 days after the procedure. Because air is put into your colon during the procedure, it is normal to pass large amounts of air from your rectum. You may not have a bowel movement for 1-3 days after the procedure. What You Will Need:  Someone to drive you home after the procedure     Steps to Take:  [address] when you get home. Because the sedative will make you drowsy, don't drive, operate machinery, or make important decisions the day of the procedure. Feelings of bloating, gas, or cramping may persist for 24 hours. Diet -  Try sips of water first. If tolerated, resume bland food (scrambled eggs, toast, soup) first.  If tolerated, resume regular diet or the diet recommended by your physician. Do not drink alcohol for 24 hours. Physical Activity -  Ask your doctor when you will be able to return to work.    Do not

## 2023-11-29 NOTE — ANESTHESIA PRE PROCEDURE
[facility] Department of Anesthesiology  Pre-Anesthesia Evaluation/Consultation       Name:  Ivett Grigsby  : 1950  Age:  67 y.o.                                            MRN:  [id number]  Date: 2023           Surgeon: Surgeon(s):  Cristina Kee MD    Procedure: Procedure(s):  COLORECTAL CANCER SCREENING, NOT HIGH RISK     Allergies   Allergen Reactions    Nsaids Other (See Comments)     History of GI bleed    Flecainide Rash     Patient Active Problem List   Diagnosis    Hypertension    Peptic ulcer    Hyperlipidemia    Vertigo    Ganglion of left wrist    Neck arthritis C5-C6-C7    Partial tear of right rotator cuff    Chronic left shoulder pain    Osteopenia    Arthritis    Degenerative disc disease, lumbar    Hoarseness of voice    Hair loss    ASHER (obstructive sleep apnea)    PAC (premature atrial contraction)     Past Medical History:   Diagnosis Date    Cancer ([address])     MELANOMA    Dysphagia, unspecified(787.20) 2010    Essential hypertension, benign 2010    Glaucoma     Kidney stones     Lesion of plantar nerve 2010    Other and unspecified hyperlipidemia 2010    Sleep apnea     with cpap    Ulcer disease      Past Surgical History:   Procedure Laterality Date    BREAST SURGERY      Biopsy    CHOLECYSTECTOMY  2012    Dr. Lenin Jurado    COLONOSCOPY      normal - dr Lizarraga Backbone N/A 10/09/2018    ESOPHAGEAL Charlott Bright performed by Mikey Cummings MD at [facility] ENDOSCOPY  10/09/2018    [facility]    UPPER GASTROINTESTINAL ENDOSCOPY N/A 10/09/2018    EGD DIAGNOSTIC ONLY performed by Mikey Cummings MD at [address] [place] [address] History     Tobacco Use    Smoking status: Former     Packs/day: 0.50     Years: 5.00     Additional pack years: 0.00     Total pack

## 2023-12-04 ENCOUNTER — TELEPHONE (OUTPATIENT)
Dept: INTERNAL MEDICINE CLINIC | Age: 73
End: 2023-12-04

## 2023-12-04 DIAGNOSIS — R00.2 PALPITATION: ICD-10-CM

## 2023-12-04 DIAGNOSIS — R00.2 PALPITATIONS: Primary | ICD-10-CM

## 2023-12-04 RX ORDER — LOSARTAN POTASSIUM AND HYDROCHLOROTHIAZIDE 12.5; 1 MG/1; MG/1
TABLET ORAL
Qty: 90 TABLET | Refills: 1 | Status: SHIPPED | OUTPATIENT
Start: 2023-12-04

## 2023-12-04 RX ORDER — LOSARTAN POTASSIUM AND HYDROCHLOROTHIAZIDE 12.5; 1 MG/1; MG/1
TABLET ORAL
Qty: 90 TABLET | Refills: 1 | OUTPATIENT
Start: 2023-12-04

## 2023-12-04 NOTE — TELEPHONE ENCOUNTER
Pt needs PA's on losartan-hydroCHLOROthiazide (HYZAAR) 100-12.5 MG per tablet   verapamil (CALAN SR) 120 MG extended release tablet     [facility] #[id number], [address] [place] [address]

## 2024-01-08 RX ORDER — ATORVASTATIN CALCIUM 10 MG/1
10 TABLET, FILM COATED ORAL DAILY
Qty: 90 TABLET | Refills: 1 | Status: SHIPPED | OUTPATIENT
Start: 2024-01-08

## 2024-01-09 ENCOUNTER — OFFICE VISIT (OUTPATIENT)
Dept: SLEEP MEDICINE | Age: 74
End: 2024-01-09
Payer: MEDICARE

## 2024-01-09 VITALS
TEMPERATURE: 97.4 F | RESPIRATION RATE: 16 BRPM | SYSTOLIC BLOOD PRESSURE: 150 MMHG | HEIGHT: 63 IN | BODY MASS INDEX: 30.9 KG/M2 | OXYGEN SATURATION: 98 % | WEIGHT: 174.4 LBS | HEART RATE: 84 BPM | DIASTOLIC BLOOD PRESSURE: 70 MMHG

## 2024-01-09 DIAGNOSIS — E66.9 OBESITY (BMI 30.0-34.9): ICD-10-CM

## 2024-01-09 DIAGNOSIS — I10 PRIMARY HYPERTENSION: ICD-10-CM

## 2024-01-09 DIAGNOSIS — G47.33 OSA ON CPAP: Primary | ICD-10-CM

## 2024-01-09 DIAGNOSIS — Z99.89 DEPENDENCE ON OTHER ENABLING MACHINES AND DEVICES: ICD-10-CM

## 2024-01-09 PROCEDURE — 3077F SYST BP >= 140 MM HG: CPT | Performed by: PSYCHIATRY & NEUROLOGY

## 2024-01-09 PROCEDURE — 99214 OFFICE O/P EST MOD 30 MIN: CPT | Performed by: PSYCHIATRY & NEUROLOGY

## 2024-01-09 PROCEDURE — G8427 DOCREV CUR MEDS BY ELIG CLIN: HCPCS | Performed by: PSYCHIATRY & NEUROLOGY

## 2024-01-09 PROCEDURE — 1090F PRES/ABSN URINE INCON ASSESS: CPT | Performed by: PSYCHIATRY & NEUROLOGY

## 2024-01-09 PROCEDURE — 1036F TOBACCO NON-USER: CPT | Performed by: PSYCHIATRY & NEUROLOGY

## 2024-01-09 PROCEDURE — 3017F COLORECTAL CA SCREEN DOC REV: CPT | Performed by: PSYCHIATRY & NEUROLOGY

## 2024-01-09 PROCEDURE — G8484 FLU IMMUNIZE NO ADMIN: HCPCS | Performed by: PSYCHIATRY & NEUROLOGY

## 2024-01-09 PROCEDURE — 3078F DIAST BP <80 MM HG: CPT | Performed by: PSYCHIATRY & NEUROLOGY

## 2024-01-09 PROCEDURE — G8417 CALC BMI ABV UP PARAM F/U: HCPCS | Performed by: PSYCHIATRY & NEUROLOGY

## 2024-01-09 PROCEDURE — G8399 PT W/DXA RESULTS DOCUMENT: HCPCS | Performed by: PSYCHIATRY & NEUROLOGY

## 2024-01-09 PROCEDURE — 1123F ACP DISCUSS/DSCN MKR DOCD: CPT | Performed by: PSYCHIATRY & NEUROLOGY

## 2024-01-09 ASSESSMENT — SLEEP AND FATIGUE QUESTIONNAIRES
HOW LIKELY ARE YOU TO NOD OFF OR FALL ASLEEP IN A CAR, WHILE STOPPED FOR A FEW MINUTES IN TRAFFIC: 0
ESS TOTAL SCORE: 1
HOW LIKELY ARE YOU TO NOD OFF OR FALL ASLEEP WHILE SITTING AND READING: 1
HOW LIKELY ARE YOU TO NOD OFF OR FALL ASLEEP WHILE SITTING INACTIVE IN A PUBLIC PLACE: 0
HOW LIKELY ARE YOU TO NOD OFF OR FALL ASLEEP WHEN YOU ARE A PASSENGER IN A CAR FOR AN HOUR WITHOUT A BREAK: 0
HOW LIKELY ARE YOU TO NOD OFF OR FALL ASLEEP WHILE WATCHING TV: 0
HOW LIKELY ARE YOU TO NOD OFF OR FALL ASLEEP WHILE LYING DOWN TO REST IN THE AFTERNOON WHEN CIRCUMSTANCES PERMIT: 0
HOW LIKELY ARE YOU TO NOD OFF OR FALL ASLEEP WHILE SITTING AND TALKING TO SOMEONE: 0
HOW LIKELY ARE YOU TO NOD OFF OR FALL ASLEEP WHILE SITTING QUIETLY AFTER LUNCH WITHOUT ALCOHOL: 0

## 2024-01-09 NOTE — PROGRESS NOTES
Nichole Medeiros         : 1950  [x] [facility]     [] [facility]      [] [facility]     [][facility]    [] [facility]  [] [facility]  [] [facility]   [] Retail Medical services [] Other:  Diagnosis: [x] ASHER (G47.33) [] CSA (G47.31) [] Apnea (G47.30)   Length of Need: [] 12 Months [x] 99 Months [] Other:    Machine (LILIANA!):  [x] [employer] AirSense     Auto [] Other:         Humidifier: [x] Heated ()        [x] Water chamber replacement ()/ 1 per 6 months        Mask:  Please always start with the mask the patient used during the titraion   [x] Nasal () /1 per 3 months    [x] Patient choice -Size and fit mask    [] Dispense:     [x] Headgear () / 1 per 6 months    [x] Replacement Nasal Cushion ()/2 per month             Tubing: [x] Heated ()/1 per 3 months    [] Standard ()/1 per 3 months [] Other:           Filters: [x] Non-disposable ()/1 per 6 months     [x] Ultra-Fine, Disposable ()/2 per month        Miscellaneous: [x] Chin Strap ()/ 1 per 6 months [] O2 bleed-in:       LPM   [] Oximetry on CPAP/Bilevel []  Other:          Start Order Date: 24    MEDICAL JUSTIFICATION:  I, the undersigned, certify that the above prescribed supplies are medically necessary for this patient’s wellbeing.  In my opinion, the supplies are both reasonable and necessary in reference to accepted standards of medicalpractice in treatment of this patient’s condition.    Homero Gill MD      NPI: [id number]       Order Signed Date: 24    Electronically signed by Homero Gill MD on 2024 at 1:32 PM

## 2024-01-09 NOTE — PROGRESS NOTES
Types: Cigarettes     Start date: 1970     Quit date: 1975     Years since quittin.3     Passive exposure: Past    Smokeless tobacco: Never   Vaping Use    Vaping Use: Never used   Substance and Sexual Activity    Alcohol use: Yes     Comment: Socially- not with verapamil    Drug use: No    Sexual activity: Not on file   Other Topics Concern    Not on file   Social History Narrative    Not on file     Social Determinants of Health     Financial Resource Strain: Low Risk  (2023)    Overall Financial Resource Strain (CARDIA)     Difficulty of Paying Living Expenses: Not hard at all   Food Insecurity: Not on file (2023)   Transportation Needs: Unknown (2023)    PRAPARE - Transportation     Lack of Transportation (Medical): Not on file     Lack of Transportation (Non-Medical): No   Physical Activity: Inactive (10/19/2023)    Exercise Vital Sign     Days of Exercise per Week: 0 days     Minutes of Exercise per Session: 0 min   Stress: Not on file   Social Connections: Not on file   Intimate Partner Violence: Not on file   Housing Stability: Unknown (2023)    Housing Stability Vital Sign     Unable to Pay for Housing in the Last Year: Not on file     Number of Places Lived in the Last Year: Not on file     Unstable Housing in the Last Year: No       Prior to Admission medications    Medication Sig Start Date End Date Taking? Authorizing Provider   atorvastatin (LIPITOR) 10 MG tablet TAKE 1 TABLET BY MOUTH DAILY 24  Yes Dinah Zimmer MD   losartan-hydroCHLOROthiazide (HYZAAR) 100-12.5 MG per tablet Take 1 tablet by mouth every day 23  Yes Dinah Zimmer MD   verapamil (CALAN SR) 120 MG extended release tablet Take 1 tablet by mouth daily 23  Yes Dinah Zimmer MD   meloxicam (MOBIC) 7.5 MG tablet Take 1 tablet by mouth daily as needed for Pain 10/19/23  Yes Dinah Zimmer MD   metoprolol succinate (TOPROL XL) 25 MG extended release tablet Take 0.5 tablets by mouth daily 23

## 2024-02-23 ENCOUNTER — OFFICE VISIT (OUTPATIENT)
Dept: INTERNAL MEDICINE CLINIC | Age: 74
End: 2024-02-23

## 2024-02-23 VITALS
TEMPERATURE: 97.7 F | OXYGEN SATURATION: 98 % | HEART RATE: 64 BPM | WEIGHT: 176.4 LBS | HEIGHT: 61 IN | SYSTOLIC BLOOD PRESSURE: 138 MMHG | DIASTOLIC BLOOD PRESSURE: 84 MMHG | BODY MASS INDEX: 33.3 KG/M2

## 2024-02-23 DIAGNOSIS — N30.01 ACUTE CYSTITIS WITH HEMATURIA: Primary | ICD-10-CM

## 2024-02-23 DIAGNOSIS — K59.00 CONSTIPATION, UNSPECIFIED CONSTIPATION TYPE: ICD-10-CM

## 2024-02-23 LAB
BILIRUBIN, POC: ABNORMAL
BLOOD URINE, POC: ABNORMAL
CLARITY, POC: ABNORMAL
COLOR, POC: YELLOW
GLUCOSE URINE, POC: ABNORMAL
KETONES, POC: ABNORMAL
LEUKOCYTE EST, POC: ABNORMAL
NITRITE, POC: ABNORMAL
PH, POC: 6
PROTEIN, POC: ABNORMAL
SPECIFIC GRAVITY, POC: 1.01
UROBILINOGEN, POC: 0.2

## 2024-02-23 RX ORDER — SULFAMETHOXAZOLE AND TRIMETHOPRIM 800; 160 MG/1; MG/1
1 TABLET ORAL 2 TIMES DAILY
Qty: 6 TABLET | Refills: 0 | Status: SHIPPED | OUTPATIENT
Start: 2024-02-23 | End: 2024-02-26

## 2024-02-23 ASSESSMENT — PATIENT HEALTH QUESTIONNAIRE - PHQ9
SUM OF ALL RESPONSES TO PHQ QUESTIONS 1-9: 0
2. FEELING DOWN, DEPRESSED OR HOPELESS: 0
1. LITTLE INTEREST OR PLEASURE IN DOING THINGS: 0
SUM OF ALL RESPONSES TO PHQ QUESTIONS 1-9: 0
SUM OF ALL RESPONSES TO PHQ9 QUESTIONS 1 & 2: 0

## 2024-02-23 NOTE — PROGRESS NOTES
Nichole Medeiros (:  1950) is a 73 y.o. female,Established patient, here for evaluation of the following chief complaint(s):  Hematuria (Pain in urine ,frequent ( possible uti) x 2 days)         ASSESSMENT/PLAN:  1. Acute cystitis with hematuria  -UA consistent with acute cystitis, will treat with Bactrim DS 1 tab twice daily for 3 days.  Sent for culture.  -     POCT Urinalysis no Micro  -     Culture, Urine  2. Constipation, unspecified constipation type   -Discussed fiber supplement, stool softener, MiraLAX if these are not helpful.  Also discussed importance of adequate hydration.    Return if symptoms worsen or fail to improve.         Subjective   SUBJECTIVE/OBJECTIVE:  HPI    Started yesterday morning - woke up early, urinated normally but had pain with urination, not burning. Throughout the day had some frequency but no hesitancy or incomplete emptying. This morning frequency was worse, little hesitancy, no incomplete emptying. Has pressure in the lower abdomen. This morning saw a small amount of blood when wiping. No back pain, fevers, chills.    She has been dealing with some constipation, ongoing for a while.  She thinks it is related to the verapamil.  She is taking some fiber gummies and sometimes a stool softener.  She probably does not drink enough water.    Review of Systems       Objective   Physical Exam  Vitals reviewed.   Constitutional:       General: She is not in acute distress.     Appearance: Normal appearance. She is well-developed.   HENT:      Head: Normocephalic and atraumatic.   Cardiovascular:      Rate and Rhythm: Normal rate and regular rhythm.      Heart sounds: Normal heart sounds.   Pulmonary:      Effort: Pulmonary effort is normal. No respiratory distress.      Breath sounds: Normal breath sounds.   Abdominal:      General: Abdomen is flat. Bowel sounds are normal. There is no distension.      Palpations: Abdomen is soft. There is no mass.      Tenderness: There is

## 2024-02-25 LAB
BACTERIA UR CULT: ABNORMAL
ORGANISM: ABNORMAL

## 2024-02-26 LAB
BACTERIA UR CULT: ABNORMAL
ORGANISM: ABNORMAL

## 2024-02-28 ENCOUNTER — TELEPHONE (OUTPATIENT)
Dept: CARDIOLOGY CLINIC | Age: 74
End: 2024-02-28

## 2024-02-28 ENCOUNTER — TELEPHONE (OUTPATIENT)
Dept: INTERNAL MEDICINE CLINIC | Age: 74
End: 2024-02-28

## 2024-02-28 NOTE — TELEPHONE ENCOUNTER
Pt called stating that she doesn't feel right. Pt is exhibiting multiple symptoms that come and go. Pt states that she feels almost like a hollowness in her chest that makes her breathe deep. She also felt tingling in her arms briefly in the car where her arms felt like dead weight but it passed. She has also felt shaky on and off. Scheduled the pt for appt with Juanjo on 03.05.2024.    [phone]      Dizziness/Syncope:    When did your symptoms start?     Are you having chest pain, shortness of breath, vomiting, or diarrhea? SOB - feel like needing to take deep breath    Is your heart racing? No BPM ranges between 65-79    Are you taking any new medications? Took a 3 day round of Bactrim for a potential UTI (Last pill taken on 03.25.2024)    Have you, or do you feel like you will pass out? No, but light or bobble headed    Have you ever had this before?

## 2024-02-28 NOTE — TELEPHONE ENCOUNTER
Pt called to check results of urine test. Pt notified of Dr. Zimmer message from 2/26/24 resulted from urine test. Pt states she is feeling a lot batter her urine is clear and her symptoms have gone away and she has stopped taking the medication.      Nichole [phone]

## 2024-02-29 ENCOUNTER — OFFICE VISIT (OUTPATIENT)
Dept: CARDIOLOGY CLINIC | Age: 74
End: 2024-02-29
Payer: MEDICARE

## 2024-02-29 ENCOUNTER — TELEPHONE (OUTPATIENT)
Dept: CARDIOLOGY CLINIC | Age: 74
End: 2024-02-29

## 2024-02-29 VITALS
HEART RATE: 66 BPM | WEIGHT: 176.4 LBS | BODY MASS INDEX: 33.88 KG/M2 | SYSTOLIC BLOOD PRESSURE: 138 MMHG | DIASTOLIC BLOOD PRESSURE: 74 MMHG

## 2024-02-29 DIAGNOSIS — I49.3 PVC (PREMATURE VENTRICULAR CONTRACTION): ICD-10-CM

## 2024-02-29 DIAGNOSIS — I10 BENIGN ESSENTIAL HTN: ICD-10-CM

## 2024-02-29 DIAGNOSIS — R06.02 SHORTNESS OF BREATH: ICD-10-CM

## 2024-02-29 DIAGNOSIS — R42 LIGHTHEADEDNESS: ICD-10-CM

## 2024-02-29 DIAGNOSIS — R00.2 PALPITATIONS: Primary | ICD-10-CM

## 2024-02-29 PROCEDURE — G8427 DOCREV CUR MEDS BY ELIG CLIN: HCPCS

## 2024-02-29 PROCEDURE — 1123F ACP DISCUSS/DSCN MKR DOCD: CPT

## 2024-02-29 PROCEDURE — 93000 ELECTROCARDIOGRAM COMPLETE: CPT

## 2024-02-29 PROCEDURE — G8399 PT W/DXA RESULTS DOCUMENT: HCPCS

## 2024-02-29 PROCEDURE — 1090F PRES/ABSN URINE INCON ASSESS: CPT

## 2024-02-29 PROCEDURE — 1036F TOBACCO NON-USER: CPT

## 2024-02-29 PROCEDURE — 3078F DIAST BP <80 MM HG: CPT

## 2024-02-29 PROCEDURE — G8417 CALC BMI ABV UP PARAM F/U: HCPCS

## 2024-02-29 PROCEDURE — G8484 FLU IMMUNIZE NO ADMIN: HCPCS

## 2024-02-29 PROCEDURE — 99214 OFFICE O/P EST MOD 30 MIN: CPT

## 2024-02-29 PROCEDURE — 3075F SYST BP GE 130 - 139MM HG: CPT

## 2024-02-29 PROCEDURE — 3017F COLORECTAL CA SCREEN DOC REV: CPT

## 2024-02-29 PROCEDURE — 93246 EXT ECG>7D<15D RECORDING: CPT | Performed by: INTERNAL MEDICINE

## 2024-02-29 NOTE — PROGRESS NOTES
[facility]   Electrophysiology  Office Visit  Date: 2/29/2024    Chief Complaint   Patient presents with    Palpitations    Dizziness       Cardiac HX: Nichole Medeiros is a 73 y.o. woman with a h/o HTN, HLD, GIB, ASHER on CPAP, dizziness, lightheadedness and palpitations, 24 hour holter (6/2/2022) showing < 1% PAC, < 1% PVC, and symptoms correlating w/ PVC, Echo (6/2/2022) EF 60%.    Interval History/HPI: Patient is here for follow up for palpations and lightheadedness. Patient was originally referred by her PCP for irregular heart beat. She has worn a 24 hour event monitor June 2022 that showed NSR, less than 1 % PAC and PVC burden and symptoms that correlated with PVCs. Her symptoms started in May 2022. She would feel a hollow feeling in her chest followed by brief lightheadedness and then the hollow feeling would last longer. This would happen frequently for about 2 weeks. She does have a family history of AF and her  has stated that she snores. She was diagnosed with ASHER and is on a CPAP. About a year ago she started having more palpitations and purchased a [employer]. Her [employer] mobile was reviewed at one of her office visited and showed bigeminal PACs. She has continued to c/o palpitations and Toprol-XL 12.5 mg daily was added with minimal symptom relief.  Today she presents in NSR with PVC, heart rate of 66. She denies any heart racing, or irregular heart beats. She does still have periods of \"hollow feeling\" in her chest. She does have some brain fog that is accompanied with a hollow feeling. She said about 3 days ago she was driving home and her arms went numb. This lasted the whole way home and she was diaphoretic with it. She did not have any chest pain with it. She does complain of shortness of breath with exertion. She gets short of breath making her bed. She does not have any shortness of breath getting dressed or walking. She sleeps elevated on 2 pillows. She is compliant with her

## 2024-03-06 RX ORDER — LOSARTAN POTASSIUM AND HYDROCHLOROTHIAZIDE 12.5; 1 MG/1; MG/1
TABLET ORAL
Qty: 90 TABLET | Refills: 2 | Status: SHIPPED | OUTPATIENT
Start: 2024-03-06

## 2024-03-14 ENCOUNTER — HOSPITAL ENCOUNTER (OUTPATIENT)
Dept: NON INVASIVE DIAGNOSTICS | Age: 74
Discharge: HOME | End: 2024-03-14
Payer: MEDICARE

## 2024-03-14 DIAGNOSIS — R06.02 SHORTNESS OF BREATH: ICD-10-CM

## 2024-03-14 DIAGNOSIS — R00.2 PALPITATIONS: ICD-10-CM

## 2024-03-14 DIAGNOSIS — I49.3 PVC (PREMATURE VENTRICULAR CONTRACTION): ICD-10-CM

## 2024-03-14 PROCEDURE — 78452 HT MUSCLE IMAGE SPECT MULT: CPT

## 2024-03-14 PROCEDURE — 93017 CV STRESS TEST TRACING ONLY: CPT

## 2024-03-14 PROCEDURE — 6360000002 HC RX W HCPCS

## 2024-03-14 PROCEDURE — 3430000000 HC RX DIAGNOSTIC RADIOPHARMACEUTICAL

## 2024-03-14 PROCEDURE — C8929 TTE W OR WO FOL WCON,DOPPLER: HCPCS

## 2024-03-14 PROCEDURE — A9502 TC99M TETROFOSMIN: HCPCS

## 2024-03-14 RX ORDER — REGADENOSON 0.08 MG/ML
0.4 INJECTION, SOLUTION INTRAVENOUS
Status: COMPLETED | OUTPATIENT
Start: 2024-03-14 | End: 2024-03-14

## 2024-03-14 RX ADMIN — TETROFOSMIN 30 MILLICURIE: 1.38 INJECTION, POWDER, LYOPHILIZED, FOR SOLUTION INTRAVENOUS at 14:10

## 2024-03-14 RX ADMIN — REGADENOSON 0.4 MG: 0.08 INJECTION, SOLUTION INTRAVENOUS at 14:10

## 2024-03-14 RX ADMIN — TETROFOSMIN 10 MILLICURIE: 1.38 INJECTION, POWDER, LYOPHILIZED, FOR SOLUTION INTRAVENOUS at 13:11

## 2024-03-21 PROCEDURE — 93248 EXT ECG>7D<15D REV&INTERPJ: CPT | Performed by: INTERNAL MEDICINE

## 2024-03-27 DIAGNOSIS — I49.8 OTHER CARDIAC ARRHYTHMIA: Primary | ICD-10-CM

## 2024-04-08 NOTE — PROGRESS NOTES
and results personally reviewed     HTN  - BP on the high side however patient is in pain w/ her back  - On amlodipine 5 mg QD, Hyzaar 100 - 12.5 mg QD  - Lifestyle modification - diet, exercise and weight loss - handouts given    EF of 60%  ASHER on PAP, follows with  Dr Gill  No systolic HF  No known CAD  No known AF   No Tobacco use.       All questions and concerns were addressed to the patient/family. Alternatives to my treatment were discussed. The note was completed using EMR. Every effort was made to ensure accuracy; however, inadvertent computerized transcription errors may be present.     Patient received education regarding their diagnosis, treatment and medications while in the office today.    Michele Geiger [facility]    I  have spent 40 minutes in care of the patient including direct face to face time, chart preparation, reviewing diagnostic testing, other provider notes and coordinating patient care.

## 2024-04-22 ENCOUNTER — OFFICE VISIT (OUTPATIENT)
Dept: INTERNAL MEDICINE CLINIC | Age: 74
End: 2024-04-22
Payer: MEDICARE

## 2024-04-22 VITALS
HEIGHT: 60 IN | SYSTOLIC BLOOD PRESSURE: 132 MMHG | DIASTOLIC BLOOD PRESSURE: 74 MMHG | BODY MASS INDEX: 34.55 KG/M2 | WEIGHT: 176 LBS

## 2024-04-22 DIAGNOSIS — E78.5 HYPERLIPIDEMIA, UNSPECIFIED HYPERLIPIDEMIA TYPE: ICD-10-CM

## 2024-04-22 DIAGNOSIS — I49.1 PAC (PREMATURE ATRIAL CONTRACTION): ICD-10-CM

## 2024-04-22 DIAGNOSIS — K59.00 CONSTIPATION, UNSPECIFIED CONSTIPATION TYPE: ICD-10-CM

## 2024-04-22 DIAGNOSIS — F41.9 ANXIETY: ICD-10-CM

## 2024-04-22 DIAGNOSIS — I10 PRIMARY HYPERTENSION: ICD-10-CM

## 2024-04-22 DIAGNOSIS — I10 PRIMARY HYPERTENSION: Primary | ICD-10-CM

## 2024-04-22 PROCEDURE — 1090F PRES/ABSN URINE INCON ASSESS: CPT | Performed by: INTERNAL MEDICINE

## 2024-04-22 PROCEDURE — G8427 DOCREV CUR MEDS BY ELIG CLIN: HCPCS | Performed by: INTERNAL MEDICINE

## 2024-04-22 PROCEDURE — G8417 CALC BMI ABV UP PARAM F/U: HCPCS | Performed by: INTERNAL MEDICINE

## 2024-04-22 PROCEDURE — 99214 OFFICE O/P EST MOD 30 MIN: CPT | Performed by: INTERNAL MEDICINE

## 2024-04-22 PROCEDURE — 3017F COLORECTAL CA SCREEN DOC REV: CPT | Performed by: INTERNAL MEDICINE

## 2024-04-22 PROCEDURE — G8399 PT W/DXA RESULTS DOCUMENT: HCPCS | Performed by: INTERNAL MEDICINE

## 2024-04-22 PROCEDURE — 1123F ACP DISCUSS/DSCN MKR DOCD: CPT | Performed by: INTERNAL MEDICINE

## 2024-04-22 PROCEDURE — 3075F SYST BP GE 130 - 139MM HG: CPT | Performed by: INTERNAL MEDICINE

## 2024-04-22 PROCEDURE — 1036F TOBACCO NON-USER: CPT | Performed by: INTERNAL MEDICINE

## 2024-04-22 PROCEDURE — 3078F DIAST BP <80 MM HG: CPT | Performed by: INTERNAL MEDICINE

## 2024-04-22 SDOH — ECONOMIC STABILITY: INCOME INSECURITY: HOW HARD IS IT FOR YOU TO PAY FOR THE VERY BASICS LIKE FOOD, HOUSING, MEDICAL CARE, AND HEATING?: NOT HARD AT ALL

## 2024-04-22 SDOH — ECONOMIC STABILITY: FOOD INSECURITY: WITHIN THE PAST 12 MONTHS, YOU WORRIED THAT YOUR FOOD WOULD RUN OUT BEFORE YOU GOT MONEY TO BUY MORE.: NEVER TRUE

## 2024-04-22 SDOH — ECONOMIC STABILITY: FOOD INSECURITY: WITHIN THE PAST 12 MONTHS, THE FOOD YOU BOUGHT JUST DIDN'T LAST AND YOU DIDN'T HAVE MONEY TO GET MORE.: NEVER TRUE

## 2024-04-22 NOTE — PROGRESS NOTES
Thought content normal.         Judgment: Judgment normal.                  An electronic signature was used to authenticate this note.    --Dinah Zimmer MD

## 2024-04-23 LAB
ALBUMIN SERPL-MCNC: 4.7 G/DL (ref 3.4–5)
ALBUMIN/GLOB SERPL: 1.9 {RATIO} (ref 1.1–2.2)
ALP SERPL-CCNC: 88 U/L (ref 40–129)
ALT SERPL-CCNC: 14 U/L (ref 10–40)
ANION GAP SERPL CALCULATED.3IONS-SCNC: 15 MMOL/L (ref 3–16)
AST SERPL-CCNC: 23 U/L (ref 15–37)
BASOPHILS # BLD: 0.1 K/UL (ref 0–0.2)
BASOPHILS NFR BLD: 1 %
BILIRUB SERPL-MCNC: 0.4 MG/DL (ref 0–1)
BUN SERPL-MCNC: 14 MG/DL (ref 7–20)
CALCIUM SERPL-MCNC: 9.8 MG/DL (ref 8.3–10.6)
CHLORIDE SERPL-SCNC: 101 MMOL/L (ref 99–110)
CHOLEST SERPL-MCNC: 189 MG/DL (ref 0–199)
CO2 SERPL-SCNC: 27 MMOL/L (ref 21–32)
CREAT SERPL-MCNC: 0.8 MG/DL (ref 0.6–1.2)
DEPRECATED RDW RBC AUTO: 13.6 % (ref 12.4–15.4)
EOSINOPHIL # BLD: 0.2 K/UL (ref 0–0.6)
EOSINOPHIL NFR BLD: 2.5 %
GFR SERPLBLD CREATININE-BSD FMLA CKD-EPI: 78 ML/MIN/{1.73_M2}
GLUCOSE SERPL-MCNC: 112 MG/DL (ref 70–99)
HCT VFR BLD AUTO: 42.2 % (ref 36–48)
HDLC SERPL-MCNC: 67 MG/DL (ref 40–60)
HGB BLD-MCNC: 14.4 G/DL (ref 12–16)
LDLC SERPL CALC-MCNC: 99 MG/DL
LYMPHOCYTES # BLD: 1.4 K/UL (ref 1–5.1)
LYMPHOCYTES NFR BLD: 19.4 %
MCH RBC QN AUTO: 33.3 PG (ref 26–34)
MCHC RBC AUTO-ENTMCNC: 34 G/DL (ref 31–36)
MCV RBC AUTO: 97.7 FL (ref 80–100)
MONOCYTES # BLD: 0.5 K/UL (ref 0–1.3)
MONOCYTES NFR BLD: 6.4 %
NEUTROPHILS # BLD: 5.1 K/UL (ref 1.7–7.7)
NEUTROPHILS NFR BLD: 70.7 %
PLATELET # BLD AUTO: 255 K/UL (ref 135–450)
PMV BLD AUTO: 9.9 FL (ref 5–10.5)
POTASSIUM SERPL-SCNC: 4 MMOL/L (ref 3.5–5.1)
PROT SERPL-MCNC: 7.2 G/DL (ref 6.4–8.2)
RBC # BLD AUTO: 4.32 M/UL (ref 4–5.2)
SODIUM SERPL-SCNC: 143 MMOL/L (ref 136–145)
TRIGL SERPL-MCNC: 113 MG/DL (ref 0–150)
VLDLC SERPL CALC-MCNC: 23 MG/DL
WBC # BLD AUTO: 7.2 K/UL (ref 4–11)

## 2024-04-25 ENCOUNTER — OFFICE VISIT (OUTPATIENT)
Dept: CARDIOLOGY CLINIC | Age: 74
End: 2024-04-25
Payer: MEDICARE

## 2024-04-25 VITALS
BODY MASS INDEX: 34.45 KG/M2 | HEART RATE: 55 BPM | DIASTOLIC BLOOD PRESSURE: 78 MMHG | SYSTOLIC BLOOD PRESSURE: 142 MMHG | WEIGHT: 176.4 LBS

## 2024-04-25 DIAGNOSIS — I49.1 PAC (PREMATURE ATRIAL CONTRACTION): ICD-10-CM

## 2024-04-25 DIAGNOSIS — I47.19 ATRIAL TACHYCARDIA (HCC): ICD-10-CM

## 2024-04-25 DIAGNOSIS — I49.3 PVC'S (PREMATURE VENTRICULAR CONTRACTIONS): ICD-10-CM

## 2024-04-25 DIAGNOSIS — I10 ESSENTIAL HYPERTENSION: ICD-10-CM

## 2024-04-25 DIAGNOSIS — R00.2 PALPITATIONS: Primary | ICD-10-CM

## 2024-04-25 PROCEDURE — G8417 CALC BMI ABV UP PARAM F/U: HCPCS | Performed by: NURSE PRACTITIONER

## 2024-04-25 PROCEDURE — 99215 OFFICE O/P EST HI 40 MIN: CPT | Performed by: NURSE PRACTITIONER

## 2024-04-25 PROCEDURE — 3078F DIAST BP <80 MM HG: CPT | Performed by: NURSE PRACTITIONER

## 2024-04-25 PROCEDURE — 3077F SYST BP >= 140 MM HG: CPT | Performed by: NURSE PRACTITIONER

## 2024-04-25 PROCEDURE — G8427 DOCREV CUR MEDS BY ELIG CLIN: HCPCS | Performed by: NURSE PRACTITIONER

## 2024-04-25 PROCEDURE — 93000 ELECTROCARDIOGRAM COMPLETE: CPT | Performed by: NURSE PRACTITIONER

## 2024-04-25 PROCEDURE — 1090F PRES/ABSN URINE INCON ASSESS: CPT | Performed by: NURSE PRACTITIONER

## 2024-04-25 PROCEDURE — 3017F COLORECTAL CA SCREEN DOC REV: CPT | Performed by: NURSE PRACTITIONER

## 2024-04-25 PROCEDURE — 1123F ACP DISCUSS/DSCN MKR DOCD: CPT | Performed by: NURSE PRACTITIONER

## 2024-04-25 PROCEDURE — G8399 PT W/DXA RESULTS DOCUMENT: HCPCS | Performed by: NURSE PRACTITIONER

## 2024-04-25 PROCEDURE — 1036F TOBACCO NON-USER: CPT | Performed by: NURSE PRACTITIONER

## 2024-04-29 ENCOUNTER — TELEPHONE (OUTPATIENT)
Dept: CARDIOLOGY CLINIC | Age: 74
End: 2024-04-29

## 2024-04-29 RX ORDER — METOPROLOL SUCCINATE 25 MG/1
12.5 TABLET, EXTENDED RELEASE ORAL DAILY
Qty: 90 TABLET | Refills: 3 | Status: SHIPPED | OUTPATIENT
Start: 2024-04-29

## 2024-04-29 NOTE — TELEPHONE ENCOUNTER
Requested Prescriptions     Pending Prescriptions Disp Refills    metoprolol succinate (TOPROL XL) 25 MG extended release tablet 90 tablet 3     Sig: Take 0.5 tablets by mouth daily          Number: 90    Refills: 3    Last Office Visit: 4/25/2024     Next Office Visit: 11/4/24

## 2024-05-07 ENCOUNTER — OFFICE VISIT (OUTPATIENT)
Age: 74
End: 2024-05-07
Payer: MEDICARE

## 2024-05-07 DIAGNOSIS — F41.9 ANXIETY: Primary | ICD-10-CM

## 2024-05-07 PROCEDURE — 90791 PSYCH DIAGNOSTIC EVALUATION: CPT | Performed by: PSYCHOLOGIST

## 2024-05-07 PROCEDURE — 1123F ACP DISCUSS/DSCN MKR DOCD: CPT | Performed by: PSYCHOLOGIST

## 2024-05-07 PROCEDURE — 1036F TOBACCO NON-USER: CPT | Performed by: PSYCHOLOGIST

## 2024-05-07 ASSESSMENT — PROMIS GLOBAL HEALTH SCALE
IN GENERAL, WOULD YOU SAY YOUR QUALITY OF LIFE IS...[ON A SCALE OF 1 (POOR) TO 5 (EXCELLENT)]: VERY GOOD
TO WHAT EXTENT ARE YOU ABLE TO CARRY OUT YOUR EVERYDAY PHYSICAL ACTIVITIES SUCH AS WALKING, CLIMBING STAIRS, CARRYING GROCERIES, OR MOVING A CHAIR [ON A SCALE OF 1 (NOT AT ALL) TO 5 (COMPLETELY)]?: MOSTLY
IN GENERAL, HOW WOULD YOU RATE YOUR MENTAL HEALTH, INCLUDING YOUR MOOD AND YOUR ABILITY TO THINK [ON A SCALE OF 1 (POOR) TO 5 (EXCELLENT)]?: VERY GOOD
IN THE PAST 7 DAYS, HOW OFTEN HAVE YOU BEEN BOTHERED BY EMOTIONAL PROBLEMS, SUCH AS FEELING ANXIOUS, DEPRESSED, OR IRRITABLE [ON A SCALE FROM 1 (NEVER) TO 5 (ALWAYS)]?: SOMETIMES
IN THE PAST 7 DAYS, HOW WOULD YOU RATE YOUR PAIN ON AVERAGE [ON A SCALE FROM 0 (NO PAIN) TO 10 (WORST IMAGINABLE PAIN)]?: 4
IN GENERAL, HOW WOULD YOU RATE YOUR SATISFACTION WITH YOUR SOCIAL ACTIVITIES AND RELATIONSHIPS [ON A SCALE OF 1 (POOR) TO 5 (EXCELLENT)]?: VERY GOOD
SUM OF RESPONSES TO QUESTIONS 3, 6, 7, & 8: 16
SUM OF RESPONSES TO QUESTIONS 2, 4, 5, & 10: 15
IN GENERAL, PLEASE RATE HOW WELL YOU CARRY OUT YOUR USUAL SOCIAL ACTIVITIES (INCLUDES ACTIVITIES AT HOME, AT WORK, AND IN YOUR COMMUNITY, AND RESPONSIBILITIES AS A PARENT, CHILD, SPOUSE, EMPLOYEE, FRIEND, ETC) [ON A SCALE OF 1 (POOR) TO 5 (EXCELLENT)]?: VERY GOOD
IN GENERAL, WOULD YOU SAY YOUR HEALTH IS...[ON A SCALE OF 1 (POOR) TO 5 (EXCELLENT)]: VERY GOOD
IN GENERAL, HOW WOULD YOU RATE YOUR PHYSICAL HEALTH [ON A SCALE OF 1 (POOR) TO 5 (EXCELLENT)]?: VERY GOOD
IN THE PAST 7 DAYS, HOW WOULD YOU RATE YOUR FATIGUE ON AVERAGE [ON A SCALE FROM 1 (NONE) TO 5 (VERY SEVERE)]?: MILD

## 2024-05-07 NOTE — PROGRESS NOTES
Outpatient Medications   Medication Sig Dispense Refill    metoprolol succinate (TOPROL XL) 25 MG extended release tablet Take 0.5 tablets by mouth daily 90 tablet 3    losartan-hydroCHLOROthiazide (HYZAAR) 100-12.5 MG per tablet TAKE 1 TABLET BY MOUTH EVERY DAY 90 tablet 2    verapamil (CALAN SR) 120 MG extended release tablet TAKE 1 TABLET BY MOUTH DAILY 90 tablet 2    atorvastatin (LIPITOR) 10 MG tablet TAKE 1 TABLET BY MOUTH DAILY 90 tablet 1    meloxicam (MOBIC) 7.5 MG tablet Take 1 tablet by mouth daily as needed for Pain 90 tablet 1    melatonin 5 MG TABS tablet Take 1 tablet by mouth nightly      Multiple Vitamins-Minerals (PRESERVISION AREDS 2 PO) Take 1 tablet by mouth daily      Calcium Carbonate Antacid (TUMS E-X PO) Take 2 tablets by mouth daily      Cholecalciferol (VITAMIN D-3 PO) Take 1,000 Int'l Units/kg by mouth daily      timolol (TIMOPTIC) 0.5 % ophthalmic solution Place 1 drop into both eyes 2 times daily      therapeutic multivitamin-minerals (THERAGRAN-M) tablet Take 1 tablet by mouth daily      omeprazole (PRILOSEC OTC) 20 MG tablet Take 1 tablet by mouth daily       No current facility-administered medications for this visit.        Chronic disease? n/a    Risk Assessment: Patient is judged to be at low risk for harm to self/others due to no current thoughts/reports of suicide and homicide.     FUNCTIONAL ASSESSMENT QUESTIONS  Problem focus: anxiety - I worry too much about everything - spouse is noting it more. Brother  at age of 73 a few years ago, dad  at 73, worried about her own health - some heart issues. Evelyn - friends for 50 with dementia years will respond to her, running out of things to say.  Anticipatory anxiety - worrying about things which don't happen  How big of a problem is this for you on a scale from 1 to 10? I don't know  When did the problem begin? Longstanding, exacerbated in the last few years, worse in the last year  What do you think might have set it off?

## 2024-05-31 ENCOUNTER — TELEPHONE (OUTPATIENT)
Dept: INTERNAL MEDICINE CLINIC | Age: 74
End: 2024-05-31

## 2024-05-31 NOTE — TELEPHONE ENCOUNTER
Pt states her pharmacy told her that she needs a PA for verapamil (CALAN SR) 120 MG extended release tablet [[id number]] and losartan-hydroCHLOROthiazide (HYZAAR) 100-12.5 MG per tablet [[id number]]. PLease call with any further questions at [phone].

## 2024-06-04 NOTE — TELEPHONE ENCOUNTER
Called pharmacy and spoke with Hu. She reports that there are no issues with these medications. They are not triggering PA needed.     If this requires a response please respond to the pool ( P MHCX PSC MEDICATION PRE-AUTH).      Thank you please advise patient.

## 2024-06-20 ENCOUNTER — TELEPHONE (OUTPATIENT)
Dept: INTERNAL MEDICINE CLINIC | Age: 74
End: 2024-06-20

## 2024-06-20 DIAGNOSIS — Z12.31 ENCOUNTER FOR SCREENING MAMMOGRAM FOR MALIGNANT NEOPLASM OF BREAST: Primary | ICD-10-CM

## 2024-06-20 DIAGNOSIS — M81.0 OSTEOPOROSIS, UNSPECIFIED OSTEOPOROSIS TYPE, UNSPECIFIED PATHOLOGICAL FRACTURE PRESENCE: ICD-10-CM

## 2024-06-20 NOTE — TELEPHONE ENCOUNTER
Pt requesting orders for a mammogram and Dexa scan because she wants it done at a [facility]; OB-GYN told her to ask her PCP for these orders.     Please call pt if orders are placed; can also contact through mobile number: [phone].

## 2024-07-10 ENCOUNTER — HOSPITAL ENCOUNTER (OUTPATIENT)
Dept: GENERAL RADIOLOGY | Age: 74
Discharge: HOME | End: 2024-07-10
Payer: MEDICARE

## 2024-07-10 ENCOUNTER — HOSPITAL ENCOUNTER (OUTPATIENT)
Dept: WOMENS IMAGING | Age: 74
Discharge: HOME | End: 2024-07-10
Payer: MEDICARE

## 2024-07-10 VITALS — HEIGHT: 61 IN | BODY MASS INDEX: 33.23 KG/M2 | WEIGHT: 176 LBS

## 2024-07-10 DIAGNOSIS — M81.0 OSTEOPOROSIS, UNSPECIFIED OSTEOPOROSIS TYPE, UNSPECIFIED PATHOLOGICAL FRACTURE PRESENCE: ICD-10-CM

## 2024-07-10 DIAGNOSIS — Z12.31 ENCOUNTER FOR SCREENING MAMMOGRAM FOR MALIGNANT NEOPLASM OF BREAST: ICD-10-CM

## 2024-07-10 PROCEDURE — 77067 SCR MAMMO BI INCL CAD: CPT

## 2024-07-10 PROCEDURE — 77080 DXA BONE DENSITY AXIAL: CPT

## 2024-10-04 ENCOUNTER — TELEPHONE (OUTPATIENT)
Dept: INTERNAL MEDICINE CLINIC | Age: 74
End: 2024-10-04

## 2024-10-04 DIAGNOSIS — I10 PRIMARY HYPERTENSION: ICD-10-CM

## 2024-10-04 DIAGNOSIS — R53.83 OTHER FATIGUE: ICD-10-CM

## 2024-10-04 DIAGNOSIS — F41.9 ANXIETY: ICD-10-CM

## 2024-10-04 DIAGNOSIS — E78.5 HYPERLIPIDEMIA, UNSPECIFIED HYPERLIPIDEMIA TYPE: Primary | ICD-10-CM

## 2024-10-04 RX ORDER — ATORVASTATIN CALCIUM 10 MG/1
10 TABLET, FILM COATED ORAL DAILY
Qty: 90 TABLET | Refills: 1 | Status: SHIPPED | OUTPATIENT
Start: 2024-10-04

## 2024-10-04 NOTE — TELEPHONE ENCOUNTER
Pt requesting the following refill    atorvastatin (LIPITOR) 10 MG tablet     [id number] - [place] [id number] [place] - [place] [phone] - F [phone]

## 2024-10-15 DIAGNOSIS — E78.5 HYPERLIPIDEMIA, UNSPECIFIED HYPERLIPIDEMIA TYPE: ICD-10-CM

## 2024-10-15 DIAGNOSIS — I10 PRIMARY HYPERTENSION: ICD-10-CM

## 2024-10-15 DIAGNOSIS — R53.83 OTHER FATIGUE: ICD-10-CM

## 2024-10-15 LAB
ALBUMIN SERPL-MCNC: 4.3 G/DL (ref 3.4–5)
ALBUMIN/GLOB SERPL: 2 {RATIO} (ref 1.1–2.2)
ALP SERPL-CCNC: 64 U/L (ref 40–129)
ALT SERPL-CCNC: 17 U/L (ref 10–40)
ANION GAP SERPL CALCULATED.3IONS-SCNC: 11 MMOL/L (ref 3–16)
AST SERPL-CCNC: 20 U/L (ref 15–37)
BASOPHILS # BLD: 0.1 K/UL (ref 0–0.2)
BASOPHILS NFR BLD: 1.1 %
BILIRUB SERPL-MCNC: 0.3 MG/DL (ref 0–1)
BUN SERPL-MCNC: 18 MG/DL (ref 7–20)
CALCIUM SERPL-MCNC: 9.5 MG/DL (ref 8.3–10.6)
CHLORIDE SERPL-SCNC: 103 MMOL/L (ref 99–110)
CHOLEST SERPL-MCNC: 174 MG/DL (ref 0–199)
CO2 SERPL-SCNC: 29 MMOL/L (ref 21–32)
CREAT SERPL-MCNC: 0.9 MG/DL (ref 0.6–1.2)
DEPRECATED RDW RBC AUTO: 13.8 % (ref 12.4–15.4)
EOSINOPHIL # BLD: 0.2 K/UL (ref 0–0.6)
EOSINOPHIL NFR BLD: 3.2 %
GFR SERPLBLD CREATININE-BSD FMLA CKD-EPI: 67 ML/MIN/{1.73_M2}
GLUCOSE SERPL-MCNC: 92 MG/DL (ref 70–99)
HCT VFR BLD AUTO: 39 % (ref 36–48)
HDLC SERPL-MCNC: 57 MG/DL (ref 40–60)
HGB BLD-MCNC: 13.1 G/DL (ref 12–16)
LDLC SERPL CALC-MCNC: 83 MG/DL
LYMPHOCYTES # BLD: 1.9 K/UL (ref 1–5.1)
LYMPHOCYTES NFR BLD: 33.9 %
MCH RBC QN AUTO: 33.5 PG (ref 26–34)
MCHC RBC AUTO-ENTMCNC: 33.6 G/DL (ref 31–36)
MCV RBC AUTO: 99.5 FL (ref 80–100)
MONOCYTES # BLD: 0.6 K/UL (ref 0–1.3)
MONOCYTES NFR BLD: 10.4 %
NEUTROPHILS # BLD: 2.9 K/UL (ref 1.7–7.7)
NEUTROPHILS NFR BLD: 51.4 %
PLATELET # BLD AUTO: 228 K/UL (ref 135–450)
PMV BLD AUTO: 9.6 FL (ref 5–10.5)
POTASSIUM SERPL-SCNC: 3.8 MMOL/L (ref 3.5–5.1)
PROT SERPL-MCNC: 6.4 G/DL (ref 6.4–8.2)
RBC # BLD AUTO: 3.92 M/UL (ref 4–5.2)
SODIUM SERPL-SCNC: 143 MMOL/L (ref 136–145)
TRIGL SERPL-MCNC: 171 MG/DL (ref 0–150)
TSH SERPL DL<=0.005 MIU/L-ACNC: 2.91 UIU/ML (ref 0.27–4.2)
VLDLC SERPL CALC-MCNC: 34 MG/DL
WBC # BLD AUTO: 5.6 K/UL (ref 4–11)

## 2024-10-21 ENCOUNTER — OFFICE VISIT (OUTPATIENT)
Dept: INTERNAL MEDICINE CLINIC | Age: 74
End: 2024-10-21
Payer: MEDICARE

## 2024-10-21 VITALS
SYSTOLIC BLOOD PRESSURE: 138 MMHG | TEMPERATURE: 98 F | DIASTOLIC BLOOD PRESSURE: 82 MMHG | BODY MASS INDEX: 33.42 KG/M2 | WEIGHT: 177 LBS | HEIGHT: 61 IN

## 2024-10-21 DIAGNOSIS — Z23 NEED FOR INFLUENZA VACCINATION: ICD-10-CM

## 2024-10-21 DIAGNOSIS — E78.5 HYPERLIPIDEMIA, UNSPECIFIED HYPERLIPIDEMIA TYPE: ICD-10-CM

## 2024-10-21 DIAGNOSIS — I10 PRIMARY HYPERTENSION: ICD-10-CM

## 2024-10-21 DIAGNOSIS — Z00.00 MEDICARE ANNUAL WELLNESS VISIT, SUBSEQUENT: Primary | ICD-10-CM

## 2024-10-21 PROCEDURE — 3017F COLORECTAL CA SCREEN DOC REV: CPT | Performed by: STUDENT IN AN ORGANIZED HEALTH CARE EDUCATION/TRAINING PROGRAM

## 2024-10-21 PROCEDURE — G0439 PPPS, SUBSEQ VISIT: HCPCS | Performed by: STUDENT IN AN ORGANIZED HEALTH CARE EDUCATION/TRAINING PROGRAM

## 2024-10-21 PROCEDURE — 3075F SYST BP GE 130 - 139MM HG: CPT | Performed by: STUDENT IN AN ORGANIZED HEALTH CARE EDUCATION/TRAINING PROGRAM

## 2024-10-21 PROCEDURE — G8484 FLU IMMUNIZE NO ADMIN: HCPCS | Performed by: STUDENT IN AN ORGANIZED HEALTH CARE EDUCATION/TRAINING PROGRAM

## 2024-10-21 PROCEDURE — 90653 IIV ADJUVANT VACCINE IM: CPT | Performed by: STUDENT IN AN ORGANIZED HEALTH CARE EDUCATION/TRAINING PROGRAM

## 2024-10-21 PROCEDURE — 1123F ACP DISCUSS/DSCN MKR DOCD: CPT | Performed by: STUDENT IN AN ORGANIZED HEALTH CARE EDUCATION/TRAINING PROGRAM

## 2024-10-21 PROCEDURE — 3078F DIAST BP <80 MM HG: CPT | Performed by: STUDENT IN AN ORGANIZED HEALTH CARE EDUCATION/TRAINING PROGRAM

## 2024-10-21 PROCEDURE — G0008 ADMIN INFLUENZA VIRUS VAC: HCPCS | Performed by: STUDENT IN AN ORGANIZED HEALTH CARE EDUCATION/TRAINING PROGRAM

## 2024-10-21 ASSESSMENT — LIFESTYLE VARIABLES
HOW OFTEN DO YOU HAVE A DRINK CONTAINING ALCOHOL: NEVER
HOW MANY STANDARD DRINKS CONTAINING ALCOHOL DO YOU HAVE ON A TYPICAL DAY: PATIENT DOES NOT DRINK

## 2024-10-21 ASSESSMENT — PATIENT HEALTH QUESTIONNAIRE - PHQ9
SUM OF ALL RESPONSES TO PHQ QUESTIONS 1-9: 0
SUM OF ALL RESPONSES TO PHQ QUESTIONS 1-9: 0
1. LITTLE INTEREST OR PLEASURE IN DOING THINGS: NOT AT ALL
2. FEELING DOWN, DEPRESSED OR HOPELESS: NOT AT ALL
SUM OF ALL RESPONSES TO PHQ9 QUESTIONS 1 & 2: 0
SUM OF ALL RESPONSES TO PHQ QUESTIONS 1-9: 0
SUM OF ALL RESPONSES TO PHQ QUESTIONS 1-9: 0

## 2024-10-21 NOTE — ASSESSMENT & PLAN NOTE
Within normal limits for age- cont to work no ADL issues,immunizations up to date, no depression ,no cognitive impairment  Colonoscopy up to date - 2023 repeat in 10 years  Mammogram up-to-date, continue annual screening  DEXA scan showed osteopenia, repeat in 2 years in 2025.  Discussed weightbearing exercise vitamin D and calcium  Eye exam up to date  Exercises as tolerated    No living will, information provided   Findings and recommendations discussed with Pt  Labs discussed

## 2024-10-21 NOTE — PATIENT INSTRUCTIONS
Learning About Being Active as an Older Adult  Why is being active important as you get older?     Being active is one of the best things you can do for your health. And it's never too late to start. Being active--or getting active, if you aren't already--has definite benefits. It can:  Give you more energy,  Keep your mind sharp.  Improve balance to reduce your risk of falls.  Help you manage chronic illness with fewer medicines.  No matter how old you are, how fit you are, or what health problems you have, there is a form of activity that will work for you. And the more physical activity you can do, the better your overall health will be.  What kinds of activity can help you stay healthy?  Being more active will make your daily activities easier. Physical activity includes planned exercise and things you do in daily life. There are four types of activity:  Aerobic.  Doing aerobic activity makes your heart and lungs strong.  Includes walking, dancing, and gardening.  Aim for at least 2½ hours spread throughout the week.  It improves your energy and can help you sleep better.  Muscle-strengthening.  This type of activity can help maintain muscle and strengthen bones.  Includes climbing stairs, using resistance bands, and lifting or carrying heavy loads.  Aim for at least twice a week.  It can help protect the knees and other joints.  Stretching.  Stretching gives you better range of motion in joints and muscles.  Includes upper arm stretches, calf stretches, and gentle yoga.  Aim for at least twice a week, preferably after your muscles are warmed up from other activities.  It can help you function better in daily life.  Balancing.  This helps you stay coordinated and have good posture.  Includes heel-to-toe walking, melquiades chi, and certain types of yoga.  Aim for at least 3 days a week.  It can reduce your risk of falling.  Even if you have a hard time meeting the recommendations, it's better to be more active

## 2024-10-21 NOTE — PROGRESS NOTES
Medicare Annual Wellness Visit    Nichole Medeiros is here for Medicare AWV (New patient /DR)    Assessment & Plan   Medicare annual wellness visit, subsequent  Assessment & Plan:   Within normal limits for age- cont to work no ADL issues,immunizations up to date, no depression ,no cognitive impairment  Colonoscopy up to date - 2023 repeat in 10 years  Mammogram up-to-date, continue annual screening  DEXA scan showed osteopenia, repeat in 2 years in 2025.  Discussed weightbearing exercise vitamin D and calcium  Eye exam up to date  Exercises as tolerated    No living will, information provided   Findings and recommendations discussed with Pt  Labs discussed    Hyperlipidemia, unspecified hyperlipidemia type  Assessment & Plan:  Doing well with Lipitor   Continue with the same   LDL at goal     Primary hypertension  Assessment & Plan:  Slightly up today but improved on repeat   Continue with Losartan HCTZ     She will get flu shot today.  She was advised to get COVID-19 booster, Prevnar 20, and Shingrix   We also discussed supplements.  Stop Tums, start calcium vitamin D for bone health.  Okay to take biotin related product for hair and nail    Recommendations for Preventive Services Due: see orders and patient instructions/AVS.  Recommended screening schedule for the next 5-10 years is provided to the patient in written form: see Patient Instructions/AVS.     No follow-ups on file.     Subjective   The following acute and/or chronic problems were also addressed today:  See A&P  Doing well. She used to see Dr. Zimmer.       Patient's complete Health Risk Assessment and screening values have been reviewed and are found in Flowsheets. The following problems were reviewed today and where indicated follow up appointments were made and/or referrals ordered.    Positive Risk Factor Screenings with Interventions:                Inactivity:  On average, how many days per week do you engage in moderate to strenuous exercise

## 2024-10-22 NOTE — PROGRESS NOTES
included cigarettes. She has a 2.50 pack-year smoking history. She has been exposed to tobacco smoke. She has never used smokeless tobacco. She reports current alcohol use. She reports that she does not use drugs. Family History:  family history includes Breast Cancer in her maternal aunt and paternal aunt; Diabetes in her brother; Heart Disease in her father; Kidney stones in her brother; Stroke in her mother. Reviewed. Denies family history of sudden cardiac death, arrhythmia, premature CAD    Review of System:    General ROS: negative for - chills, fever   Psychological ROS: negative for - anxiety or depression  Ophthalmic ROS: negative for - eye pain or loss of vision  ENT ROS: negative for - epistaxis, headaches, nasal discharge, sore throat   Allergy and Immunology ROS: negative for - hives, nasal congestion   Hematological and Lymphatic ROS: negative for - bleeding problems, blood clots, bruising or jaundice  Endocrine ROS: negative for - skin changes, temperature intolerance or unexpected weight changes  Respiratory ROS: negative for - cough, hemoptysis, pleuritic pain, SOB, sputum changes or wheezing  Cardiovascular ROS: Per HPI. Gastrointestinal ROS: negative for - abdominal pain, blood in stools, diarrhea, hematemesis, melena, nausea/vomiting or swallowing difficulty/pain  Genito-Urinary ROS: negative for - dysuria or incontinence  Musculoskeletal ROS: negative for - joint swelling or muscle pain  Neurological ROS: negative for - confusion, dizziness, gait disturbance, headaches, numbness/tingling, seizures, speech problems, tremors, visual changes or weakness  Dermatological ROS: negative for - rash    Physical Examination:  Vitals:    10/26/23 1256   BP: 130/76   Pulse: 58         Constitutional: Oriented. No distress. Head: Normocephalic and atraumatic. Mouth/Throat: Oropharynx is clear and moist.   Eyes: Conjunctivae normal. EOM are normal.   Neck: Normal range of motion. Neck supple.
Pediatric

## 2024-10-22 NOTE — PROGRESS NOTES
normal. EOM are normal.   Neck: Normal range of motion. Neck supple. No rigidity.  No JVD present.   Cardiovascular: Normal rate, regular rhythm, S1&S2 and intact distal pulses.   Pulmonary/Chest: Bilateral respiratory sounds. No wheezes. No rhonchi.    Abdominal: Soft. Bowel sounds present. No distension, No tenderness.   Musculoskeletal: No tenderness. No edema    Lymphadenopathy: Has no cervical adenopathy.   Neurological: Alert and oriented. Cranial nerve appears intact, No Gross deficit   Skin: Skin is warm and dry. No rash noted.   Psychiatric: Has a normal mood, affect and behavior     Labs:  Reviewed.     ECG: reviewed,SR with PACs    Studies:   1. 14 day CAM (2/28-3/14/24): SR with average HR 61 (42-95), 141 runs of AT lasting up to 21 beats, 14% PAC, <1% PVC, symptoms correlatingwith AT, EAR, PAC, PVC    24 hr Holter (6/2/22): SR with average HR 77 (), 432 PVC (<1%), PAC (<1%), symptoms correlating with PVC    2. Echo 3/14/24:    Summary   Normal left ventricle size, wall thickness and systolic function with an   estimated ejection fraction of 55-60%.   No regional wall motion abnormalities are seen.   Normal diastolic function.      3. Stress test 3/14/24:  Summary   There is normal isotope uptake at stress and rest. There is no evidence of   myocardial ischemia or scar.   Normal LV size and systolic function.   Left ventricular ejection fraction of 85 %.   There are no regional wall motion abnormalities.   Overall findings represent a low risk scan.    The MCOT, echocardiogram, stress test, and coronary angiography/PCI were reviewed by myself and used for my plan of care.       Thank you for allowing me to participate in the care of Nichole Medeiros. All questions and concerns were addressed to the patient/family. Alternatives to my treatment were discussed.     I, Deirdre Rascon RN, am scribing for and in the presence of Dr. Koki Reese. 11/04/24 3:17 PM  Deirdre Rascon,

## 2024-11-04 ENCOUNTER — OFFICE VISIT (OUTPATIENT)
Dept: CARDIOLOGY CLINIC | Age: 74
End: 2024-11-04

## 2024-11-04 VITALS
DIASTOLIC BLOOD PRESSURE: 80 MMHG | BODY MASS INDEX: 33.82 KG/M2 | WEIGHT: 179 LBS | HEART RATE: 64 BPM | SYSTOLIC BLOOD PRESSURE: 144 MMHG

## 2024-11-04 DIAGNOSIS — R00.2 PALPITATIONS: Primary | ICD-10-CM

## 2024-11-04 DIAGNOSIS — I49.1 PAC (PREMATURE ATRIAL CONTRACTION): ICD-10-CM

## 2024-11-04 DIAGNOSIS — I10 ESSENTIAL HYPERTENSION: ICD-10-CM

## 2024-11-04 DIAGNOSIS — I49.3 PVC'S (PREMATURE VENTRICULAR CONTRACTIONS): ICD-10-CM

## 2024-11-04 DIAGNOSIS — I47.19 ATRIAL TACHYCARDIA (HCC): ICD-10-CM

## 2024-11-04 DIAGNOSIS — G47.33 OSA (OBSTRUCTIVE SLEEP APNEA): ICD-10-CM

## 2024-11-20 PROBLEM — Z00.00 MEDICARE ANNUAL WELLNESS VISIT, SUBSEQUENT: Status: RESOLVED | Noted: 2018-09-28 | Resolved: 2024-11-20

## 2025-01-03 DIAGNOSIS — E78.5 HYPERLIPIDEMIA, UNSPECIFIED HYPERLIPIDEMIA TYPE: Primary | ICD-10-CM

## 2025-01-03 RX ORDER — ATORVASTATIN CALCIUM 10 MG/1
10 TABLET, FILM COATED ORAL DAILY
Qty: 90 TABLET | Refills: 1 | Status: SHIPPED | OUTPATIENT
Start: 2025-01-03

## 2025-01-03 NOTE — TELEPHONE ENCOUNTER
Last appointment: 10/21/2024  Next appointment: 4/22/2025  Last refill:   Requested Prescriptions     Pending Prescriptions Disp Refills    atorvastatin (LIPITOR) 10 MG tablet [Pharmacy Med Name: ATORVASTATIN 10MG TABLETS] 90 tablet 1     Sig: TAKE 1 TABLET BY MOUTH DAILY

## 2025-02-24 RX ORDER — LOSARTAN POTASSIUM AND HYDROCHLOROTHIAZIDE 12.5; 1 MG/1; MG/1
TABLET ORAL
Qty: 90 TABLET | Refills: 1 | Status: SHIPPED | OUTPATIENT
Start: 2025-02-24

## 2025-02-24 RX ORDER — VERAPAMIL HYDROCHLORIDE 120 MG/1
120 TABLET, FILM COATED, EXTENDED RELEASE ORAL DAILY
Qty: 90 TABLET | Refills: 1 | Status: SHIPPED | OUTPATIENT
Start: 2025-02-24

## 2025-02-24 NOTE — TELEPHONE ENCOUNTER
Patient needs refills of the following medications:    Verapamil 120 mg     Losartan/hydrochlorothiazide 100/12.5 mg     90 day supply    Sent to [id number] - [address]

## 2025-04-10 NOTE — PROGRESS NOTES
[facility]   Electrophysiology  Office Visit  Date: 5/8/2025    Chief Complaint   Patient presents with    Palpitations    Tachycardia    Hypertension    Dizziness     Cardiac HX: Nichole Medeiros is a 74 y.o. woman with a h/o HTN, HLD, GIB, ASHER on CPAP, dizziness, lightheadedness and palpitations, 24 hour holter (6/2022) w/ NSR, <1% PACs, PVCs, s/s w/ PVCs, echo (6/2022) EF 60%, 2 week CAM (4/2023) w/ NSR, SB, 280 AT episodes, longest 32 beats, 10% PAC burden, placed on flecainide, developed a rash and flec d/c'd, 2 week CAM (3/2024) 141 AT episodes, longest 21 beats, 14% PACs, echo (3/2024) EF 55 to 60%, MPI (3/2024) low risk scan.    Interval History/HPI: Patient is here for follow-up for palpitations, lightheadedness and dizziness.  Patient was originally referred by her PCP for an irregular heartbeat.  She was complaining of palpitations and wore 24-hour Holter monitor in June 2022 that showed normal sinus rhythm and an overall less than 1% PAC/PVC burden.  She had symptoms to correlate with PVCs.  Her symptoms had originally started in May 2022.  She will complain of a hollow feeling in her chest followed by brief lightheadedness and then the hollow feeling would last longer.  Her symptoms appeared to happen frequently for about a 2-week period.  She had no triggers for the symptoms however she did have a steroid injection a month prior.  She does have a strong family history of AF.  She was diagnosed with ASHER and is on a CPAP.  In February 2023 she complained of recurrent palpitations.  She eventually got a [employer] mobile and review of her strips showed bigeminal PACs.  Other recordings were difficult to interpret due to artifact and poor tracings.  She was advised to drink more fluids, exercise and make lifestyle changes.  She continued to complain of palpitations and was placed on Toprol-XL with minimal relief.  She wore a CAM in March 2023 that lasted only 15 hours and her predominant rhythm was

## 2025-04-17 ENCOUNTER — TELEPHONE (OUTPATIENT)
Dept: INTERNAL MEDICINE CLINIC | Age: 75
End: 2025-04-17

## 2025-04-17 DIAGNOSIS — M85.80 OSTEOPENIA, UNSPECIFIED LOCATION: ICD-10-CM

## 2025-04-17 DIAGNOSIS — I10 PRIMARY HYPERTENSION: ICD-10-CM

## 2025-04-17 DIAGNOSIS — E78.5 HYPERLIPIDEMIA, UNSPECIFIED HYPERLIPIDEMIA TYPE: Primary | ICD-10-CM

## 2025-04-17 NOTE — TELEPHONE ENCOUNTER
Pt is asking if she needs to have labs done before her follow up appointment on Tuesday please call and advise

## 2025-04-18 DIAGNOSIS — M85.80 OSTEOPENIA, UNSPECIFIED LOCATION: ICD-10-CM

## 2025-04-18 DIAGNOSIS — I10 PRIMARY HYPERTENSION: ICD-10-CM

## 2025-04-18 LAB
ALBUMIN SERPL-MCNC: 4.2 G/DL (ref 3.4–5)
ALBUMIN/GLOB SERPL: 2.1 {RATIO} (ref 1.1–2.2)
ALP SERPL-CCNC: 75 U/L (ref 40–129)
ALT SERPL-CCNC: 17 U/L (ref 10–40)
ANION GAP SERPL CALCULATED.3IONS-SCNC: 11 MMOL/L (ref 3–16)
AST SERPL-CCNC: 21 U/L (ref 15–37)
BASOPHILS # BLD: 0.1 K/UL (ref 0–0.2)
BASOPHILS NFR BLD: 1.1 %
BILIRUB SERPL-MCNC: 0.3 MG/DL (ref 0–1)
BUN SERPL-MCNC: 20 MG/DL (ref 7–20)
CALCIUM SERPL-MCNC: 9.5 MG/DL (ref 8.3–10.6)
CHLORIDE SERPL-SCNC: 102 MMOL/L (ref 99–110)
CHOLEST SERPL-MCNC: 170 MG/DL (ref 0–199)
CO2 SERPL-SCNC: 29 MMOL/L (ref 21–32)
CREAT SERPL-MCNC: 0.9 MG/DL (ref 0.6–1.2)
DEPRECATED RDW RBC AUTO: 14.1 % (ref 12.4–15.4)
EOSINOPHIL # BLD: 0.3 K/UL (ref 0–0.6)
EOSINOPHIL NFR BLD: 4.7 %
GFR SERPLBLD CREATININE-BSD FMLA CKD-EPI: 67 ML/MIN/{1.73_M2}
GLUCOSE SERPL-MCNC: 105 MG/DL (ref 70–99)
HCT VFR BLD AUTO: 38.8 % (ref 36–48)
HDLC SERPL-MCNC: 56 MG/DL (ref 40–60)
HGB BLD-MCNC: 13 G/DL (ref 12–16)
LDL CHOLESTEROL: 97 MG/DL
LYMPHOCYTES # BLD: 1.5 K/UL (ref 1–5.1)
LYMPHOCYTES NFR BLD: 27.8 %
MCH RBC QN AUTO: 32.4 PG (ref 26–34)
MCHC RBC AUTO-ENTMCNC: 33.4 G/DL (ref 31–36)
MCV RBC AUTO: 96.8 FL (ref 80–100)
MONOCYTES # BLD: 0.6 K/UL (ref 0–1.3)
MONOCYTES NFR BLD: 11.3 %
NEUTROPHILS # BLD: 3 K/UL (ref 1.7–7.7)
NEUTROPHILS NFR BLD: 55.1 %
PLATELET # BLD AUTO: 239 K/UL (ref 135–450)
PMV BLD AUTO: 9.4 FL (ref 5–10.5)
POTASSIUM SERPL-SCNC: 4.1 MMOL/L (ref 3.5–5.1)
PROT SERPL-MCNC: 6.2 G/DL (ref 6.4–8.2)
RBC # BLD AUTO: 4 M/UL (ref 4–5.2)
SODIUM SERPL-SCNC: 142 MMOL/L (ref 136–145)
TRIGL SERPL-MCNC: 84 MG/DL (ref 0–150)
TSH SERPL DL<=0.005 MIU/L-ACNC: 2.86 UIU/ML (ref 0.27–4.2)
VLDLC SERPL CALC-MCNC: 17 MG/DL
WBC # BLD AUTO: 5.5 K/UL (ref 4–11)

## 2025-04-22 ENCOUNTER — OFFICE VISIT (OUTPATIENT)
Dept: INTERNAL MEDICINE CLINIC | Age: 75
End: 2025-04-22

## 2025-04-22 ENCOUNTER — RESULTS FOLLOW-UP (OUTPATIENT)
Dept: INTERNAL MEDICINE CLINIC | Age: 75
End: 2025-04-22

## 2025-04-22 VITALS
BODY MASS INDEX: 33.87 KG/M2 | HEIGHT: 61 IN | SYSTOLIC BLOOD PRESSURE: 130 MMHG | DIASTOLIC BLOOD PRESSURE: 80 MMHG | TEMPERATURE: 97.2 F | WEIGHT: 179.4 LBS | OXYGEN SATURATION: 95 % | HEART RATE: 60 BPM

## 2025-04-22 DIAGNOSIS — G89.29 CHRONIC MIDLINE LOW BACK PAIN WITHOUT SCIATICA: ICD-10-CM

## 2025-04-22 DIAGNOSIS — G47.33 OSA (OBSTRUCTIVE SLEEP APNEA): ICD-10-CM

## 2025-04-22 DIAGNOSIS — E78.5 HYPERLIPIDEMIA, UNSPECIFIED HYPERLIPIDEMIA TYPE: ICD-10-CM

## 2025-04-22 DIAGNOSIS — I10 PRIMARY HYPERTENSION: Primary | ICD-10-CM

## 2025-04-22 DIAGNOSIS — M54.50 CHRONIC MIDLINE LOW BACK PAIN WITHOUT SCIATICA: ICD-10-CM

## 2025-04-22 SDOH — ECONOMIC STABILITY: FOOD INSECURITY: WITHIN THE PAST 12 MONTHS, YOU WORRIED THAT YOUR FOOD WOULD RUN OUT BEFORE YOU GOT MONEY TO BUY MORE.: NEVER TRUE

## 2025-04-22 SDOH — ECONOMIC STABILITY: FOOD INSECURITY: WITHIN THE PAST 12 MONTHS, THE FOOD YOU BOUGHT JUST DIDN'T LAST AND YOU DIDN'T HAVE MONEY TO GET MORE.: NEVER TRUE

## 2025-04-22 ASSESSMENT — PATIENT HEALTH QUESTIONNAIRE - PHQ9
SUM OF ALL RESPONSES TO PHQ QUESTIONS 1-9: 0
2. FEELING DOWN, DEPRESSED OR HOPELESS: NOT AT ALL
1. LITTLE INTEREST OR PLEASURE IN DOING THINGS: NOT AT ALL
SUM OF ALL RESPONSES TO PHQ QUESTIONS 1-9: 0

## 2025-04-22 NOTE — PROGRESS NOTES
Nichole Medeiros (:  1950) is a 74 y.o. female here for evaluation of the following chief complaint(s):  Cystitis, Hypertension, and Follow-up      Assessment & Plan   ASSESSMENT/PLAN:  1. Primary hypertension  Assessment & Plan:  Well controlled  Continue with Losartan HCTZ   Orders:  -     Hemoglobin A1C; Future  -     Lipid, Fasting; Future  -     TSH reflex to FT4; Future  -     Comprehensive Metabolic Panel; Future  -     CBC with Auto Differential; Future  2. ASHER (obstructive sleep apnea)  Assessment & Plan:   Continue with CPAP  Orders:  -     Hemoglobin A1C; Future  -     Lipid, Fasting; Future  -     TSH reflex to FT4; Future  -     Comprehensive Metabolic Panel; Future  -     CBC with Auto Differential; Future  3. Hyperlipidemia, unspecified hyperlipidemia type  Assessment & Plan:  Doing well with Lipitor   Continue with the same   LDL at goal     Orders:  -     Hemoglobin A1C; Future  -     Lipid, Fasting; Future  -     TSH reflex to FT4; Future  -     Comprehensive Metabolic Panel; Future  -     CBC with Auto Differential; Future  4. Chronic midline low back pain without sciatica  -     Amb External Referral To Physical Therapy  See Dr. Gordon had injection, etc without much improvement.   Will start PT   If needed or sx worsen will consider referral to [facility] in about 6 months (around 10/22/2025) for Annual Wellness Visit.         Subjective   SUBJECTIVE/OBJECTIVE:  HPI  Nichole presented today for a follow up visit.   She is doing well.   She has been taking calcium and vit D. We also discussed increased calcium in diet.   Complains of dry cough. Only in the morning. Used CPAP at night time. No sputum production. No wheezing SOB. No cough at night time or after eating. No CP.  She was advised to try saline nasal spray at night time to improve nasal drainage.   Does endorse tenderness along the right side of the neck, anterior aspect. No bruit on exam, likely due to muscle related -

## 2025-05-08 ENCOUNTER — OFFICE VISIT (OUTPATIENT)
Dept: CARDIOLOGY CLINIC | Age: 75
End: 2025-05-08
Payer: MEDICARE

## 2025-05-08 VITALS
HEART RATE: 58 BPM | WEIGHT: 178.4 LBS | SYSTOLIC BLOOD PRESSURE: 136 MMHG | BODY MASS INDEX: 33.71 KG/M2 | DIASTOLIC BLOOD PRESSURE: 72 MMHG

## 2025-05-08 DIAGNOSIS — I47.19 ATRIAL TACHYCARDIA: ICD-10-CM

## 2025-05-08 DIAGNOSIS — I49.1 PAC (PREMATURE ATRIAL CONTRACTION): Primary | ICD-10-CM

## 2025-05-08 DIAGNOSIS — I49.3 PVC'S (PREMATURE VENTRICULAR CONTRACTIONS): ICD-10-CM

## 2025-05-08 DIAGNOSIS — I10 ESSENTIAL HYPERTENSION: ICD-10-CM

## 2025-05-08 DIAGNOSIS — R00.2 PALPITATIONS: ICD-10-CM

## 2025-05-08 DIAGNOSIS — G47.33 OSA (OBSTRUCTIVE SLEEP APNEA): ICD-10-CM

## 2025-05-08 PROCEDURE — 93000 ELECTROCARDIOGRAM COMPLETE: CPT | Performed by: NURSE PRACTITIONER

## 2025-05-08 PROCEDURE — G8399 PT W/DXA RESULTS DOCUMENT: HCPCS | Performed by: NURSE PRACTITIONER

## 2025-05-08 PROCEDURE — 1036F TOBACCO NON-USER: CPT | Performed by: NURSE PRACTITIONER

## 2025-05-08 PROCEDURE — 3017F COLORECTAL CA SCREEN DOC REV: CPT | Performed by: NURSE PRACTITIONER

## 2025-05-08 PROCEDURE — 1123F ACP DISCUSS/DSCN MKR DOCD: CPT | Performed by: NURSE PRACTITIONER

## 2025-05-08 PROCEDURE — 1159F MED LIST DOCD IN RCRD: CPT | Performed by: NURSE PRACTITIONER

## 2025-05-08 PROCEDURE — 3075F SYST BP GE 130 - 139MM HG: CPT | Performed by: NURSE PRACTITIONER

## 2025-05-08 PROCEDURE — 1090F PRES/ABSN URINE INCON ASSESS: CPT | Performed by: NURSE PRACTITIONER

## 2025-05-08 PROCEDURE — 99215 OFFICE O/P EST HI 40 MIN: CPT | Performed by: NURSE PRACTITIONER

## 2025-05-08 PROCEDURE — G8427 DOCREV CUR MEDS BY ELIG CLIN: HCPCS | Performed by: NURSE PRACTITIONER

## 2025-05-08 PROCEDURE — 1160F RVW MEDS BY RX/DR IN RCRD: CPT | Performed by: NURSE PRACTITIONER

## 2025-05-08 PROCEDURE — G8417 CALC BMI ABV UP PARAM F/U: HCPCS | Performed by: NURSE PRACTITIONER

## 2025-05-08 PROCEDURE — 3078F DIAST BP <80 MM HG: CPT | Performed by: NURSE PRACTITIONER

## 2025-05-28 RX ORDER — VERAPAMIL HYDROCHLORIDE 120 MG/1
120 TABLET, FILM COATED, EXTENDED RELEASE ORAL DAILY
Qty: 90 TABLET | Refills: 1 | Status: SHIPPED | OUTPATIENT
Start: 2025-05-28

## 2025-05-28 NOTE — TELEPHONE ENCOUNTER
Last appointment: 4/22/2025  Next appointment: 10/23/2025        Last refill: (2/24/2025) by Dominique Daniels MD

## 2025-06-13 NOTE — TELEPHONE ENCOUNTER
Called patient.  Patient states that she has had symptoms ongoing for about 3 days where she feels a hollow feeling in her chest and she needs to take a deep breath.  She also has noted some shortness of breath in waves.  She has had some chills and some sweating.  She states that she recently was treated for UTI with Bactrim however she is no longer on antibiotics.  She does have [employer] Mobile at home which has shown possible atrial fibrillation and other times normal sinus rhythm.  She states that she was driving in the car yesterday and all of a sudden her arms felt tingly bilaterally and that her symptoms went away.  We did change patient's appointment and she will be seen in the afternoon today.   Dr Jordan to speak with pt/aunt

## 2025-07-02 DIAGNOSIS — E78.5 HYPERLIPIDEMIA, UNSPECIFIED HYPERLIPIDEMIA TYPE: ICD-10-CM

## 2025-07-02 RX ORDER — ATORVASTATIN CALCIUM 10 MG/1
10 TABLET, FILM COATED ORAL DAILY
Qty: 90 TABLET | Refills: 1 | Status: SHIPPED | OUTPATIENT
Start: 2025-07-02

## 2025-07-02 NOTE — TELEPHONE ENCOUNTER
Last appointment: 4/22/2025  Next appointment: 10/23/2025        Last refill: 1/3/2025) by Dominique Daniels MD

## 2025-07-14 ENCOUNTER — HOSPITAL ENCOUNTER (OUTPATIENT)
Dept: WOMENS IMAGING | Age: 75
Discharge: HOME | End: 2025-07-14
Payer: MEDICARE

## 2025-07-14 VITALS — BODY MASS INDEX: 33.61 KG/M2 | WEIGHT: 178 LBS | HEIGHT: 61 IN

## 2025-07-14 DIAGNOSIS — Z12.31 VISIT FOR SCREENING MAMMOGRAM: ICD-10-CM

## 2025-07-14 PROCEDURE — 77063 BREAST TOMOSYNTHESIS BI: CPT

## 2025-07-28 RX ORDER — METOPROLOL SUCCINATE 25 MG/1
12.5 TABLET, EXTENDED RELEASE ORAL DAILY
Qty: 90 TABLET | Refills: 3 | Status: SHIPPED | OUTPATIENT
Start: 2025-07-28

## 2025-08-20 RX ORDER — LOSARTAN POTASSIUM AND HYDROCHLOROTHIAZIDE 12.5; 1 MG/1; MG/1
1 TABLET ORAL DAILY
Qty: 90 TABLET | Refills: 1 | Status: SHIPPED | OUTPATIENT
Start: 2025-08-20

## (undated) DEVICE — SNARE COLD DIAMOND 10MM THIN